# Patient Record
Sex: FEMALE | Employment: OTHER | URBAN - METROPOLITAN AREA
[De-identification: names, ages, dates, MRNs, and addresses within clinical notes are randomized per-mention and may not be internally consistent; named-entity substitution may affect disease eponyms.]

---

## 2018-09-17 ENCOUNTER — APPOINTMENT (OUTPATIENT)
Dept: PHYSICAL THERAPY | Age: 68
End: 2018-09-17

## 2018-10-01 ENCOUNTER — HOSPITAL ENCOUNTER (OUTPATIENT)
Dept: PHYSICAL THERAPY | Age: 68
Discharge: HOME | End: 2018-10-01
Payer: COMMERCIAL

## 2018-10-01 PROCEDURE — 97162 PT EVAL MOD COMPLEX 30 MIN: CPT

## 2018-10-01 PROCEDURE — 97165 OT EVAL LOW COMPLEX 30 MIN: CPT

## 2018-10-01 PROCEDURE — 97112 NEUROMUSCULAR REEDUCATION: CPT

## 2018-10-01 PROCEDURE — 97110 THERAPEUTIC EXERCISES: CPT

## 2018-10-01 NOTE — PROGRESS NOTES
[facility] [address] [facility] 
[phone] fax Plan of Care/ Statement of Necessity for Physical Therapy Services Patient name: Florinda Gagnon Start of Care: 10/1/2018 Referral source: Ayush Carty MD : 1950 Medical Diagnosis: Cerebral infarction, unspecified [I63.9] Onset Date: 2018 Treatment Diagnosis: CVA, difficulty with ambulation Prior Hospitalization: see medical history Provider#: [id number] Medications: Verified on Patient summary List  
 Comorbidities: HTN Prior Level of Function: Ind with ambulation, working, Ind with ADLs The Plan of Care and following information is based on the information from the initial evaluation. Assessment/ key information:  Pt. Is a 80year old female c/o left side weakness and difficulty with ambulation following a CVA in 2018. She reports having inpatient rehab and home health PT. She requires assistance for transfers to her bed and bed mobility at home. Pt. Is currently living with her daughter with 3 steps to enter the house and has no hand rails. She presents with significant decrease in left LE strength. Left hip flex: 0/5 knee flex: 1/5 knee ext: 2/5 ankle DF/PF 0/5. She has extensor tone in left knee as well. She required min A for stand pivot transfer to bed. Min A for sit to supine and supine to sit transfers. Skilled PT is medically necessary in order to improve left LE strength and transfers for improved independence at home. Evaluation Complexity History MEDIUM  Complexity : 1-2 comorbidities / personal factors will impact the outcome/ POC ; Examination MEDIUM Complexity : 3 Standardized tests and measures addressing body structure, function, activity limitation and / or participation in recreation  ;Presentation MEDIUM Complexity : Evolving with changing characteristics  ; Clinical Decision Making MEDIUM Complexity : FOTO score of 26-74 Overall Complexity Rating: MEDIUM Problem List: pain affecting function, decrease ROM, decrease strength, impaired gait/ balance, decrease ADL/ functional abilitiies, decrease activity tolerance, decrease flexibility/ joint mobility and decrease transfer abilities Treatment Plan may include any combination of the following: Therapeutic exercise, Therapeutic activities, Neuromuscular re-education, Physical agent/modality, Gait/balance training, Manual therapy, Patient education, Self Care training, Functional mobility training, Home safety training and Stair training Patient / Family readiness to learn indicated by: asking questions Persons(s) to be included in education: patient (P) Barriers to Learning/Limitations: None Patient Goal (s): to walk better Patient Self Reported Health Status: good Rehabilitation Potential: fair Short Term Goals: To be accomplished in 1 weeks: 1. Patient will demonstrate compliance with HEP in order to improve left LE mobility for increased ease of transfers Long Term Goals: To be accomplished in 6 weeks: 1. Patient will improve FOTO score by 13 points in order to demonstrate a significant improvement in function. 2. Patient will improve wheelchair to table transfers with SBA in order to increase ease of transfers at home. 3. Patient will perform sit to supine and supine to sit transfers with SBA in order to increase ease of transfers at home. 4. Patient will ambulate 20 feet with hemiwalker and CGA in order to increase ease of ambulation at home. Frequency / Duration: Patient to be seen 2-3 times per week for 6 weeks. Patient/ CarPatient/ Caregiver education and instruction: Diagnosis, prognosis, exercises 
 [x]  Plan of care has been reviewed with IRVIN Lopez, PT 10/1/2018 11:53 AM 
 
________________________________________________________________________ I certify that the above Therapy Services are being furnished while the patient is under my care. I agree with the treatment plan and certify that this therapy is necessary. [de-identified] Signature:____________Date:_________TIME:________ 
 
[employer], Date and Time must be completed for valid certification ** Please sign and return to [address] [facility] 
[phone] fax

## 2018-10-01 NOTE — PROGRESS NOTES
OT DAILY TREATMENT NOTE - [facility]  Patient Name: Chung Segal Date:10/1/2018 : 1950 [x]  Patient  Verified Payor: BLUE CROSS / Plan: [facility] PPO / Product Type: PPO / In time:1015  Out time:1100 Total Treatment Time (min): 45 Visit #: 1 of 24 Treatment Area: Cerebral infarction, unspecified [I63.9] SUBJECTIVE Pain Level (0-10 scale): 0/10 Any medication changes, allergies to medications, adverse drug reactions, diagnosis change, or new procedure performed?: [x] No    [] Yes (see summary sheet for update) Subjective functional status/changes:   [] No changes reported I am going to retire OBJECTIVE 15 min Therapeutic Exercise:  [] See flow sheet :  
Rationale: increase ROM to improve the patients ability to reach BUE swiss ball shoulder flexion, hor ab add x 10 each With 
 [] TE 
 [] TA 
 [] neuro 
 [] other: Patient Education: [x] Review HEP [] Progressed/Changed HEP based on: OT POC [] positioning   [] body mechanics   [] transfers   [] heat/ice application  
[] Splint wear/care   [] Sensory re-education   [] scar management     
[] other:   
 
     
Other Objective/Functional Measures:  
Subjective: pt is a right hand dominant, 80 y.o.y/o, female who fell from bed with right CVA injury 2018, on floor overnight until found went to [facility] until May, then [facility] in august.  
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 Pain level:(0-no pain 10-debilitating pain) no  
  
Current functional limitations/living situation: Staying with daughter in [facility], staying on first floor 2 steps to get in. Plan to stay there Medical hx: HTN Medications: See the written copy of this report in the patient's paper medical record. Objective: 
 
Range of Motion: Active Passive Norms Right Left Right Left Shoulder Flex 0-180  80  120 Ext 0-60      
 abd 0-180  40  120 Horizontal add 0-45 IR 0-70  unable ER 0-90  To ear  45 Elbow Ext/flex 0-150  -20  -20 Forearm Supination 0-80  [facility] [place] Pronation 0-80  WNL Wrist Flex 0-80  [facility] [place] Ext 0-70  [facility] Ulnar Dev 0-30  NT Radial Dev 0-20  NT Hand ROM WNL Hand Strength: 
 Gross Grasp 3pt Pinch Lateral Pinch Tip Pinch Right  50 11 14 12 Left 10 4 6 4 Nine-Hole Peg Test:  Left= _23 out, 37 in____seconds  Right=___20__secondsleft neglect Finger Opposition:Able to touch all with cueing Palpation: Poor scapular mobility ADLs Feeding:        []MaxA   []ModA   [x]Tiffany   [] CGA   []SBA   []Dariela   []Independent UE Dressing:       []MaxA   []ModA   [x]Tiffany   [] CGA   []SBA   []Dariela   []Independent LE Dressing:       []MaxA   [x]ModA   []Tiffany   [] CGA   []SBA   []Dariela   []Independent Grooming:       []MaxA   []ModA   []Tiffany   [] CGA   []SBA   []Dariela   [x]Independent Toileting:       []MaxA   []ModA   []Tiffany   [] CGA   []SBA   [x]Dariela   []IndependentBSC Bathing:       []MaxA   [x]ModA   []Tiffany   [] CGA   []SBA   []Darieal   []Independent Light Meal Prep:    [x]MaxA   []ModA   []Tiffany   [] CGA   []SBA   []Dariela   []Independent Household/Other:  [x]MaxA   []ModA   []Tiffany   [] CGA   []SBA   []Dariela   []Independent Adaptive Equip:     []MaxA   []ModA   []Tiffany   [] CGA   []SBA   []Dariela   []Independent Driving:       [x]MaxA   []ModA   []Tiffany   [] CGA   []SBA   []Dariela   []Independent Money Mgmt:        []MaxA   []ModA   []Tiffany   [] CGA   []SBA   []Dariela   []Independent Coordination   GM 
                         FM []WFL          [x] Impaired overflw []WFL          [x] Impaired overflow Tone/Motor Control []WFL          [x] Impaired praxis Midline Symmetry [x]WFL          [] Impaired Visual Perception:                    R/L Neglect R/L Discrimination Body Scheme []WFL          [x] Impaired  
[x]WFL          [] Impaired []WFL          [x] Impaired []WFL          [] Impaired Visual Motor Skills Tracking Tracing  
                         Writing  
[x]WFL          [] Impaired  
 
[x]WFL          [] Impaired  
[x]WFL          [] Impaired Cognition [x]Person         [x]Place            [x]Date [x]Situation/ Behavior Follows Commands  []     1-step  [] 2-step   [x]Multi-step Memory: STM 
                           LTM [x]WFL          [] Impaired  
[x]WFL          [] Impaired Safety Awareness [x]WFL          [] Impaired Judgment  [x]WFL          [] Impaired Express Needs [x]WFL          [] Impaired Pain Level (0-10 scale) post treatment: 4/10 ASSESSMENT/Changes in Function:   
[x]  See Plan of Care 
[]  See progress note/recertification 
[]  See Discharge Summary PLAN 
[]  Upgrade activities as tolerated     []  Continue plan of care 
[]  Update interventions per flow sheet      
[]  Discharge due to:_ 
[]  Other:_ ELIAS Avelar 10/1/2018  10:06 AM 
 
Future Appointments Date Time Provider Wolf Peters 10/1/2018 11:00 AM Elly Stiles, PT [id number] [facility]

## 2018-10-01 NOTE — PROGRESS NOTES
[facility] [address] [facility] 
[phone] fax Plan of Care/Statement of Necessity for Occupational Therapy Services Patient name: Chung Segal Start of Care: 10/1/2018 Referral source: Jessica Vanessa MD : 1950 Medical Diagnosis: Cerebral infarction, unspecified [I63.9] Onset Date:2018 Treatment Diagnosis: Weakness, loss of coordination left side, visual scanning deficit Prior Hospitalization: see medical history Provider#: [id number] Medications: Verified on Patient summary List  
 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 Assessment/ key information: 80year old RHD female who had sudden onset of weakness and fell from bed in 2018. She laid there overnight and then was transported to [facility] until May , then to [facility] and returned home in August.  She is currently living with daughter in [facility] with several steps to enter but full bath on first floor. She does not plan to return to own home or work as  at [employer] She denied pain initially, then reported 4/10 pain in left shoulder during PROM. She has marked apraxia and overflow movments in left upper extremity although AROm is essentially [place] [place] except shoulder flexion, abduction and ER and elbow extension. She receives assist for all self care except oral care and eating ( not cutting meat). She has active grasp of 10#, and pinches of 4-6#. She can complete the 9 hole peg test in 60 seconds but has visual deficits noted in locating pegs and holes, particularly on left side. She also demonstrated inaccurate scanning on targeted letter task. Her FOTO is 34/100 reflecting very severe impairment in function.   She will benefit from skilled occupational therapy to improve Left UE coordination, eliminate pain and increase independence with improved left side visual attention and scanning. Evaluation Complexity: History LOW Complexity : Brief history review  Examination LOW Complexity : 1-3 performance deficits relating to physical, cognitive , or psychosocial skils that result in activity limitations and / or participation restrictions  Clinical Decision Making LOW Complexity : No comorbidities that affect functional and no verbal or physical assistance needed to complete eval tasks Overall Complexity Rating: LOW Problem List: Pain effecting function, Decreased range of motion, Decreased strength, Decreased coordination/prehension, Decreased ADL/functional abilities  and Other Treatment Plan may include any combination of the following: Therapeutic exercise, Therapeutic activities, Neuromuscular re-education, Physical agent/modality, Patient education, ADLs/IADLs and Other Patient / Family readiness to learn indicated by: asking questions, trying to perform skills and interest 
 
Persons(s) to be included in education:   patient (P) Barriers to Learning/Limitations: yes;  sensory deficits-vision/hearing/speech Patient Goal (s): Do more for self, stronger Patient Self Reported Health Status: good Rehabilitation Potential: excellent Short Term Goals: To be accomplished in 2 weeks: 1. Patient will be familiar with strategies to improve accuracy of scanning skills. 2.  Patient will be independent in home program for stretching left shoulder and elbow. 3.  Patient will be able to place left hand accurately 8/10 trials to reduce injury risk. Long Term Goals: To be accomplished in 8 weeks: 1. Patient will be able to perform basic self care tasks at min assist level for safety issues only. 2  .  Paiten tiwll be able to participate safely in home care tasks from wheelchair level. 3.  Patient iwll improve left hand  at least 10# and pinches at least 2-3# each for ease of fasteners and opening packages. 4.  Patient willl  report improve performance of home care ans self care as shown by increase in FOTO of at least 20 points. Frequency / Duration: Patient to be seen 3 times per week for 8 weeks: 
 
Patient/ Caregiver education and instruction: Diagnosis, prognosis, self care, activity modification and exercises 
 [x]  Plan of care has been reviewed with VESNA Brink/L 10/1/2018 12:25 PM 
 
_____________________________________________________________________ I certify that the above Therapy Services are being furnished while the patient is under my care. I agree with the treatment plan and certify that this therapy is necessary. [de-identified] Signature:____________Date:_________TIME:________ 
 
[employer], Date and Time must be completed for valid certification ** Please sign and return to [address] [facility] 
[phone] fax

## 2018-10-01 NOTE — PROGRESS NOTES
PT DAILY TREATMENT NOTE/NEURO EVAL 10-18 Patient Name: Logan Allen Date:10/1/2018 : 1950 [x]  Patient  Verified Payor: BLUE CROSS / Plan: [facility] PPO / Product Type: PPO / In time: 11:00  Out time: 11:50 Total Treatment Time (min): 50 Visit #: 1 of  Treatment Area: Cerebral infarction, unspecified [I63.9] SUBJECTIVE Pain Level (0-10 scale):  0/10 []constant []intermittent []improving []worsening []no change since onset Any medication changes, allergies to medications, adverse drug reactions, diagnosis change, or new procedure performed?: [x] No    [] Yes (see summary sheet for update) Subjective functional status/changes:    
PLOF: right hand dominant Mechanism of Injury:  CVA end of April. Was at home and fell out of bed. Daughter checked in on her and then called 911 to take her to hospital. Was in hospital. And had inpatient rehab at [facility]. Also had home health PT. Wheelchair bound at home. Transfers to bed side commode by herself. Assistance into bed. Some assistance getting leg into bed when laying. Assistance with car transfer. Used Vinfoliowalker for ambulation during home health. Work Hx: worked at [employer] on [employer] Living Situation:  Lives with daughter currently. 3 steps to get inside. And 1 step inside. No rails. Pt Goals: to get mobile OBJECTIVE/EXAMINATION 8 min Therapeutic Exercise:  [x] See flow sheet :  
Rationale: increase ROM and increase strength to improve the patients ability to increase ease of ADLs With 
 [x] TE 
 [] TA 
 [] neuro 
 [] other: Patient Education: [x] Review HEP [] Progressed/Changed HEP based on:  
[] positioning   [] body mechanics   [] transfers   [] heat/ice application   
[] other:   
 
Physical Therapy Evaluation  Neurologic Gait: [] Normal    [x] Abnormal    Device:  Parallel bars Describe: mod A x2 to bring left LE fwd 
 
 Strength (MMT):                                         
Hip L (1-5) R (1-5) Hip Flexion 0 4 Hip Ext Hip ABD Hip ADD Hip ER Hip IR Knee L (1-5) R (1-5) Knee Flexion 1 4 Knee Extension 2 4+ Ankle PF 0 4 Ankle DF 0 4 Other Tone: extensor tone in left knee Functional Mobility Bed Mobility:   
  Scooting:  
     Rolling: 
     Sit-Supine: mod A Transfers: 
     Sit-Stand: CGA with limited use of left LE Floor-Stand:  
    Gait: 
     Tandem: 
     Backwards: 
     Braiding: 
    Elevations: 
     Curbs: 
    Ramps: 
    Stairs: Other test /comments: 
  
 
Pain Level (0-10 scale) post treatment: 0/10 ASSESSMENT/Changes in Function:  
  
[x]  See Plan of Care 
[]  See progress note/recertification 
[]  See Discharge Summary Progress towards goals / Updated goals: 
See POC PLAN 
[]  Upgrade activities as tolerated     [x]  Continue plan of care 
[]  Update interventions per flow sheet      
[]  Discharge due to:_ 
[]  Other:_ Junior Giron PT 10/1/2018  11:05 AM

## 2018-10-03 ENCOUNTER — HOSPITAL ENCOUNTER (OUTPATIENT)
Dept: PHYSICAL THERAPY | Age: 68
Discharge: HOME | End: 2018-10-03
Payer: COMMERCIAL

## 2018-10-03 PROCEDURE — 97110 THERAPEUTIC EXERCISES: CPT

## 2018-10-03 PROCEDURE — 97530 THERAPEUTIC ACTIVITIES: CPT

## 2018-10-03 PROCEDURE — 97112 NEUROMUSCULAR REEDUCATION: CPT

## 2018-10-03 NOTE — PROGRESS NOTES
OT DAILY TREATMENT NOTE - [facility] 3-16 Patient Name: Cecelia Guillaume Date:10/3/2018 : 1950 [x]  Patient  Verified Payor: BLUE CROSS / Plan: [facility] PPO / Product Type: PPO / In time:1100  Out time:1130 Total Treatment Time (min): 30 Total Timed Codes (min): 30 
1:1 Treatment Time (MC only): 30 Visit #: 2 of 24 Treatment Area: Muscle weakness of left upper extremity [M62.81] SUBJECTIVE Pain Level (0-10 scale): 0/10 Any medication changes, allergies to medications, adverse drug reactions, diagnosis change, or new procedure performed?: [x] No    [] Yes (see summary sheet for update) Subjective functional status/changes:   [] No changes reported Oh that was fun! OBJECTIVE 12 min Therapeutic Exercise:  [] See flow sheet :  
Rationale: increase ROM and increase strength to improve the patients ability to reach Swiss Ball: B Shoulder flexion, horizontal ab/adduction 18 min Therapeutic Activity:  []  See flow sheet :  
Rationale: Scanning  to improve the patients ability to perform tasks at home safetly Scanning task/education - Word Search With 
 [] TE 
 [] TA 
 [] neuro 
 [] other: Patient Education: [x] Review HEP [] Progressed/Changed HEP based on:  
[] positioning   [] body mechanics   [] transfers   [] heat/ice application  
[] Splint wear/care   [] Sensory re-education   [] scar management     
[] other:   
 
     
Other Objective/Functional Measures: Mod/Max difficulty with word search, improvement with success as activity progressed Pain Level (0-10 scale) post treatment: 0/10 ASSESSMENT/Changes in Function: ROM improving, continued difficulties with scanning Patient will continue to benefit from skilled OT services to modify and progress therapeutic interventions, address ROM deficits, address strength deficits, analyze and cue movement patterns and instruct in home and community integration to attain remaining goals. []  See Plan of Care 
[]  See progress note/recertification 
[]  See Discharge Summary Progress towards goals / Updated goals: 
Short Term Goals: To be accomplished in 2 weeks: 1. Patient will be familiar with strategies to improve accuracy of scanning skills. Progressing 10/3/18 2. Patient will be independent in home program for stretching left shoulder and elbow. 3.  Patient will be able to place left hand accurately 8/10 trials to reduce injury risk. 
  
Long Term Goals: To be accomplished in 8 weeks: 1.  Patient will be able to perform basic self care tasks at min assist level for safety issues only. 2  .  Paiten tiwll be able to participate safely in home care tasks from wheelchair level. 3.  Patient iwll improve left hand  at least 10# and pinches at least 2-3# each for ease of fasteners and opening packages. 4.  Patient willl  report improve performance of home care ans self care as shown by increase in FOTO of at least 20 points. PLAN 
[]  Upgrade activities as tolerated     [x]  Continue plan of care 
[]  Update interventions per flow sheet      
[]  Discharge due to:_ 
[]  Other:_ MAICOL Brandt 10/3/2018  11:54 AM 
 
Future Appointments Date Time Provider Wolf Peters 10/5/2018 11:00 AM [facility] PT [address] MMCPTPB [facility]  
10/5/2018 11:30 AM Fritz Davis MMCPTPB [facility]  
10/9/2018 11:00 AM Slim Brown PTA [facility]CPTP[facility]  
10/9/2018 11:30 AM Rick Scales Grogg MMCPTPB [facility]  
10/10/2018 11:00 AM Fritz Davis MMCPTPB [facility]  
10/10/2018 11:30 AM Sergio Hermosillo PT [id number] [facility]  
10/12/2018 11:00 AM Sergio Hermosillo, PT UOUUXCT [facility]  
10/12/2018 11:30 AM Rick Scales Grogg MMCPTPB [facility]  
10/15/2018 9:30 AM Sergio Hermosillo, PT [id number] [facility]  
10/15/2018 10:15 AM Rick Scales Grogg MMCPTPB [facility]  
10/17/2018 11:00 AM Miguelito Paz, OTR/L MMCPTPB [facility]  
10/17/2018 12:00 PM Sergio Hermosillo, PT MMCPTPB [facility]  
 10/19/2018 10:45 AM Miguelito Paz, OTR/L MMCPTPB [facility]  
10/19/2018 11:30 AM Sergio Hermosillo, PT [id number] [facility]  
10/22/2018 10:30 AM Sergio Hermosillo, PT [id number] [facility]  
10/22/2018 11:00 AM Fritz Davis [id number] [facility]  
10/24/2018 10:15 AM Rick Khan [id number] [facility]  
10/24/2018 11:00 AM Robina Trujillo, PT MMCPTPB [facility]  
10/26/2018 10:30 AM Sergio Hermosillo, PT [id number] [facility]  
10/26/2018 11:00 AM Fritz Davis [id number] [facility]  
10/29/2018 10:30 AM Osman Collins PTA MMCPTPB [facility]  
10/29/2018 11:00 AM Fritz Davis MMCPTPB [facility]  
10/31/2018 10:30 AM Sergio Hermosillo, PT [id number] [facility]  
10/31/2018 11:00 AM Fritz Davis [id number] [facility]  
11/2/2018 10:30 AM Sergio Hermosillo, PT [id number] [facility]  
11/2/2018 11:15 AM Fritz Odonnell Lasso MMCPTPB [facility]

## 2018-10-03 NOTE — PROGRESS NOTES
PT DAILY TREATMENT NOTE - [facility]  Patient Name: Leo Naidu Date:10/3/2018 : 1950 [x]  Patient  Verified Payor: BLUE CROSS / Plan: [facility] PPO / Product Type: PPO / In time: 10:30  Out time: 11:00 Total Treatment Time (min): 30 Total Timed Codes (min): 30 
1:1 Treatment Time ( only): 30 Visit #: 2 of  Treatment Area: Cerebral infarction, unspecified [I63.9] SUBJECTIVE Pain Level (0-10 scale): 0/10 Any medication changes, allergies to medications, adverse drug reactions, diagnosis change, or new procedure performed?: [x] No    [] Yes (see summary sheet for update) Subjective functional status/changes:   [] No changes reported Pt. Reports she is feeling about the same. She has been doing her HEP. OBJECTIVE 30 min Neuromuscular Re-education:  -x  See flow sheet :  
Rationale: increase strength, improve coordination and improve balance  to improve the patients ability to increase ease of ambulation With 
 [x] TE 
 [] TA 
 [] neuro 
 [] other: Patient Education: [x] Review HEP [] Progressed/Changed HEP based on:  
[] positioning   [] body mechanics   [] transfers   [] heat/ice application   
[] other:   
 
Other Objective/Functional Measures:  
Pt. Continues to be challenged with weight bearing through left LE for sit to stand transfers Poor muscle activation during PNF and heel slides Pt. Has significant extensor tone in right knee Pain Level (0-10 scale) post treatment:  0/10 ASSESSMENT/Changes in Function:  Pt. Initiated PT and is compliant with her HEP.   
 
Patient will continue to benefit from skilled PT services to modify and progress therapeutic interventions, address functional mobility deficits, address ROM deficits, address strength deficits, analyze and address soft tissue restrictions, analyze and cue movement patterns, analyze and modify body mechanics/ergonomics and assess and modify postural abnormalities to attain remaining goals. Progress towards goals / Updated goals: 
Short Term Goals: To be accomplished in 1 weeks: 1. Patient will demonstrate compliance with HEP in order to improve left LE mobility for increased ease of transfers 
met 
  
Long Term Goals: To be accomplished in 6 weeks: 1. Patient will improve FOTO score by 13 points in order to demonstrate a significant improvement in function. 2. Patient will improve wheelchair to table transfers with SBA in order to increase ease of transfers at home. 3. Patient will perform sit to supine and supine to sit transfers with SBA in order to increase ease of transfers at home. 4. Patient will ambulate 20 feet with hemiwalker and CGA in order to increase ease of ambulation at home. PLAN 
[]  Upgrade activities as tolerated     [x]  Continue plan of care 
[]  Update interventions per flow sheet      
[]  Discharge due to:_ 
[]  Other:_ Racheal Mar PT 10/3/2018  6:11 PM 
 
Future Appointments Date Time Provider Wolf Peters 10/5/2018 11:00 AM [facility] PT [facility] [address] MMCPTPB [facility]  
10/5/2018 11:30 AM Vito Zhenggg MMCPTPB [facility]  
10/9/2018 11:00 AM Amos Solis PTA MMCPTPB [facility]  
10/9/2018 11:30 AM Vito Zhenggg MMCPTPB [facility]  
10/10/2018 11:00 AM Vito Zhenggg MMCPTPB [facility]  
10/10/2018 11:30 AM Racheal Mar PT [id number] [facility]  
10/12/2018 11:00 AM Racheal Mar PT SKDNORS [facility]  
10/12/2018 11:30 AM Vito Zhenggg MMCPTPB [facility]  
10/15/2018 9:30 AM Racheal Mar PT [id number] [facility]  
10/15/2018 10:15 AM Vito Delarosa MMCPTPB [facility]  
10/17/2018 11:00 AM Ghazala Jhonatan, OTR/L MMCPTPB [facility]  
10/17/2018 12:00 PM Racheal Mar, PT MMCPTPB [facility]  
10/19/2018 10:45 AM Ghazala Israel, OTR/L MMCPTPB [facility]  
10/19/2018 11:30 AM Racheal Mar, PT [id number] [facility]  
10/22/2018 10:30 AM Racheal Mar, PT [id number] [facility]  
10/22/2018 11:00 AM Ailyn Pham [id number] [facility]  
10/24/2018 10:15 AM Trini Sepulveda MMCPTPB [facility]  
 10/24/2018 11:00 AM Uday Poon, PT MMCPTPB [facility]  
10/26/2018 10:30 AM Tin Romo, PT [id number] [facility]  
10/26/2018 11:00 AM Kerry Dee MMCPTPB [facility]  
10/29/2018 10:30 AM Matthew Granados PTA MMCPTPB [facility]  
10/29/2018 11:00 AM Kerry Dee MMCPTPB [facility]  
10/31/2018 10:30 AM Tin Romo, PT BRAATHH [facility]  
10/31/2018 11:00 AM Kerry Dee MMCPTPB [facility]  
11/2/2018 10:30 AM Tin Romo, PT CHRAWPH [facility]  
11/2/2018 11:15 AM Kerry Hood MMCPTPB [facility]

## 2018-10-05 ENCOUNTER — HOSPITAL ENCOUNTER (OUTPATIENT)
Dept: PHYSICAL THERAPY | Age: 68
Discharge: HOME | End: 2018-10-05
Payer: COMMERCIAL

## 2018-10-05 PROCEDURE — 97530 THERAPEUTIC ACTIVITIES: CPT

## 2018-10-05 PROCEDURE — 97110 THERAPEUTIC EXERCISES: CPT

## 2018-10-05 PROCEDURE — 97112 NEUROMUSCULAR REEDUCATION: CPT

## 2018-10-05 NOTE — PROGRESS NOTES
PT DAILY TREATMENT NOTE - [facility]  Patient Name: Jag Case Date:10/5/2018 : 1950 [x]  Patient  Verified Payor: BLUE CROSS / Plan: [facility] PPO / Product Type: PPO / In time: 11:00  Out time: 11:32 Total Treatment Time (min): 32 Total Timed Codes (min): 30 
1:1 Treatment Time ( only): 30 Visit #: 3 of  Treatment Area: Cerebral infarction, unspecified [I63.9] SUBJECTIVE Pain Level (0-10 scale): 0/10 Any medication changes, allergies to medications, adverse drug reactions, diagnosis change, or new procedure performed?: [x] No    [] Yes (see summary sheet for update) Subjective functional status/changes:   [x] No changes reported OBJECTIVE 
  
32 (30) min Neuromuscular Re-education:  See flow sheet : (2 minutes waiting on therapist) Rationale: increase strength, improve coordination and improve balance  to improve the patients ability to increase ease of ambulation With 
 [x] TE 
 [] TA 
 [] neuro 
 [] other: Patient Education: [x] Review HEP [] Progressed/Changed HEP based on:  
[] positioning   [] body mechanics   [] transfers   [] heat/ice application   
[] other:   
 
Other Objective/Functional Measures:  
- Patient with poor left LE weightbearing and no quad control. Held gait training until patient is able to functionally weight shift and perform stepping, as it would be max/total A and unsafe at this time. Pain Level (0-10 scale) post treatment:  0/10 ASSESSMENT/Changes in Function:  Fair tolerance to TE with Mod A for all functional tasks. Patient is able to stand to walker and transfer with CGA however in a non-functional manner as she demonstrates hardly any weightbearing through left LE and left knee extensor tone.  She is able to perform supine to sidelying to sit and sit to sidelying to supine to/from the right but reports she is not able to do this at home as she cannot access the right side of her bed, forcing her to sit up to/from the left which requires her to sit straight up and Min A form her daughter to  manage her left LE. She requires Total A to flex her left knee to perform bridging and heel slides. She requires facilitation at left hip to clear mat to perform functional bridging. Max A required to stand at walker and perform weight shifts, with left knee block and max verbal cuing. Patient requires max cuing for technique to perform semi-functional sit to stand, educated on NDT hand placement to increase weight bearing through left LE, however patient reports she has help at home and her daughter assists her with everything including w/c mobility. Patient will continue to benefit from skilled PT services to modify and progress therapeutic interventions, address functional mobility deficits, address ROM deficits, address strength deficits, analyze and address soft tissue restrictions, analyze and cue movement patterns, analyze and modify body mechanics/ergonomics and assess and modify postural abnormalities to attain remaining goals. Progress towards goals / Updated goals: 
Short Term Goals: To be accomplished in 1 weeks: 1. Patient will demonstrate compliance with HEP in order to improve left LE mobility for increased ease of transfers 
met 
  
Long Term Goals: To be accomplished in 6 weeks: 1. Patient will improve FOTO score by 13 points in order to demonstrate a significant improvement in function. 2. Patient will improve wheelchair to table transfers with SBA in order to increase ease of transfers at home. 3. Patient will perform sit to supine and supine to sit transfers with SBA in order to increase ease of transfers at home. 4. Patient will ambulate 20 feet with hemiwalker and CGA in order to increase ease of ambulation at home. PLAN 
[]  Upgrade activities as tolerated     [x]  Continue plan of care 
[]  Update interventions per flow sheet []  Discharge due to:_ 
[]  Other:_   
 
Baron Garciat 10/5/2018  11:32 AM 
 
Future Appointments Date Time Provider Wolf Peters 10/9/2018 11:00 AM Nitin Fernandez PTA MMCPTPB [facility]  
10/9/2018 11:30 AM Kerry Dee MMCPTPB [facility]  
10/10/2018 11:00 AM Kerry Dee MMCPTPB [facility]  
10/10/2018 11:30 AM Tin Romo, PT [id number] [facility]  
10/12/2018 11:00 AM Tin Romo, PT [id number] [facility]  
10/12/2018 11:30 AM Kerry Dee MMCPTPB [facility]  
10/15/2018 9:30 AM Tin Romo, PT [id number] [facility]  
10/15/2018 10:15 AM Kerry Dee IWZPENH [facility]  
10/17/2018 11:00 AM Elmiraella Emmanuel, OTR/L MMCPTPB [facility]  
10/17/2018 12:00 PM Tin Romo, PT MMCPTPB [facility]  
10/19/2018 10:45 AM Suella Emmanuel, OTR/L MMCPTPB [facility]  
10/19/2018 11:30 AM Tin Romo, PT [id number] [facility]  
10/22/2018 10:30 AM Tin Romo, PT [id number] [facility]  
10/22/2018 11:00 AM Kerry Dee JKFEAMA [facility]  
10/24/2018 10:15 AM Ravindra Teresa [id number] [facility]  
10/24/2018 11:00 AM Uday Poon, PT MMCPTPB [facility]  
10/26/2018 10:30 AM Tin Romo, PT [id number] [facility]  
10/26/2018 11:00 AM Kerry Dee MMCPTPB [facility]  
10/29/2018 10:30 AM Matthew Granados PTA MMCPTPB [facility]  
10/29/2018 11:00 AM Kerry Dee MMCPTPB [facility]  
10/31/2018 10:30 AM Tin Romo, PT [id number] [facility]  
10/31/2018 11:00 AM eKrry Dee MMCPTPB [facility]  
11/2/2018 10:30 AM Tin Romo, PT [id number] [facility]  
11/2/2018 11:15 AM Kerry Hood MMCPTPB [facility]

## 2018-10-05 NOTE — PROGRESS NOTES
OT DAILY TREATMENT NOTE 10-18 Patient Name: Carmina Iyer Date:10/5/2018 : 1950 [x]  Patient  Verified Payor: BLUE CROSS / Plan: [facility] PPO / Product Type: PPO / In time:1135  Out time:1200 Total Treatment Time (min): 25 Visit #: 3 of 24 Medicare/BCBS Only Total Timed Codes (min):  25 1:1 Treatment Time:  25 Treatment Area: Muscle weakness of left upper extremity [M62.81] SUBJECTIVE Pain Level (0-10 scale): 0/10 Any medication changes, allergies to medications, adverse drug reactions, diagnosis change, or new procedure performed?: [x] No    [] Yes (see summary sheet for update) Subjective functional status/changes:   [] No changes reported Pt reports stiffness in neck OBJECTIVE 10 min Therapeutic Exercise:  [] See flow sheet :  
Rationale: increase ROM and increase strength to improve the patients ability to functionally use Left UE 
 
B Floor [id number] 
B Horizontal Ab/Adduction 10x B Shoulder flexion 10x 
 
15 min Therapeutic Activity:  []  See flow sheet :  
Rationale: Scanning  to improve the patients ability to perform daily tasks, read Word Seacg With 
 [] TE 
 [] TA 
 [] neuro 
 [] other: Patient Education: [x] Review HEP [] Progressed/Changed HEP based on:  
[] positioning   [] body mechanics   [] transfers   [] heat/ice application  
[] Splint wear/care   [] Sensory re-education   [] scar management     
[] other:   
 
     
Other Objective/Functional Measures:  
Pt able to locate 5 words with Min Cueing Pain Level (0-10 scale) post treatment: 0/10 ASSESSMENT/Changes in Function: Improvement with scanning ability on this date Patient will continue to benefit from skilled OT services to modify and progress therapeutic interventions, address ROM deficits, address strength deficits, analyze and cue movement patterns and instruct in home and community integration to attain remaining goals. []  See Plan of Care []  See progress note/recertification 
[]  See Discharge Summary Progress towards goals / Updated goals: 
Short Term Goals: To be accomplished in 2 weeks: 1.  Patient will be familiar with strategies to improve accuracy of scanning skills. Progressing 10/5/18 2.  Patient will be independent in home program for stretching left shoulder and elbow. Improving 10/5/18 3.  Patient will be able to place left hand accurately 8/10 trials to reduce injury risk. 
   
Long Term Goals: To be accomplished in [address] [place] [address] 
1.  Patient will be able to perform basic self care tasks at min assist level for safety issues only. 2  .  Massimoten thomaswll be able to participate safely in home care tasks from wheelchair level. 3.  Patient iwll improve left hand  at least 10# and pinches at least 2-3# each for ease of fasteners and opening packages. 4.  Patient willl  report improve performance of home care ans self care as shown by increase in FOTO of at least 20 points. PLAN 
[]  Upgrade activities as tolerated     []  Continue plan of care 
[]  Update interventions per flow sheet      
[]  Discharge due to:_ 
[]  Other:_ MAICOL Flannery 10/5/2018  12:26 PM 
 
Future Appointments Date Time Provider Wolf Peters 10/9/2018 11:00 AM Leopoldo Barrett, PTA MMCPTPB [facility]  
10/9/2018 11:30 AM Aminata Delarosa MMCPTPB [facility]  
10/10/2018 11:00 AM Abbe Juanjose MMCPTPB [facility]  
10/10/2018 11:30 AM Ino Reyes, PT [id number] [facility]  
10/12/2018 11:00 AM Ino Reyes, PT IDLMPRV [facility]  
10/12/2018 11:30 AM Abbe Sow MMCPTPB [facility]  
10/15/2018 9:30 AM Ino Reyes, PT [id number] [facility]  
10/15/2018 10:15 AM Aminata Delarosa MMCPTPB [facility]  
10/17/2018 11:00 AM Edith Alpers, OTR/L MMCPTPB [facility]  
10/17/2018 12:00 PM Ino Reyes PT MMCPTPB [facility]  
10/19/2018 10:45 AM Edith Alpers, OTR/L MMCPTPB [facility]  
10/19/2018 11:30 AM Ino Reyes, PT [id number] [facility]  
 10/22/2018 10:30 AM Jose Brewster, PT MMCPTPB [facility]  
10/22/2018 11:00 AM Olden [employer] MMCPTPB [facility]  
10/24/2018 10:15 AM Isreal Mettle Caryle Sawyermando CROWDER [facility]  
10/24/2018 11:00 AM Adina Peguero, PT MMCPTPB [facility]  
10/26/2018 10:30 AM Jose Brewster, PT [id number] [facility]  
10/26/2018 11:00 AM [employer] HWJEBMX [facility]  
10/29/2018 10:30 AM Carlos Perez, PTA MMCPTPB [facility]  
10/29/2018 11:00 [facility]  
10/31/2018 10:30 AM Jose Brewster, PT [id number] [facility]  
10/31/2018 11:00 AM [employer] MMCPTPB [facility]  
11/2/2018 10:30 AM Jose Brewster, PT [id number] [facility]  
11/2/2018 11:15 AM [employer] Cathy Gonzalez MMCPTPB [facility]

## 2018-10-09 ENCOUNTER — HOSPITAL ENCOUNTER (OUTPATIENT)
Dept: PHYSICAL THERAPY | Age: 68
Discharge: HOME | End: 2018-10-09
Payer: COMMERCIAL

## 2018-10-09 PROCEDURE — 97112 NEUROMUSCULAR REEDUCATION: CPT

## 2018-10-09 PROCEDURE — 97530 THERAPEUTIC ACTIVITIES: CPT

## 2018-10-09 PROCEDURE — 97110 THERAPEUTIC EXERCISES: CPT

## 2018-10-09 NOTE — PROGRESS NOTES
PT DAILY TREATMENT NOTE 10-18 Patient Name: Basil Sosa Date:10/9/2018 : 1950 [x]  Patient  Verified Payor: BLUE CROSS / Plan: [facility] PPO / Product Type: PPO / In time:1100  Out time:1129 Total Treatment Time (min): 29 Visit #: 4 of  Medicare/BCBS Only Total Timed Codes (min):  29 1:1 Treatment Time:  34 Treatment Area: Cerebral infarction, unspecified [I63.9] SUBJECTIVE Pain Level (0-10 scale): 0/10 Any medication changes, allergies to medications, adverse drug reactions, diagnosis change, or new procedure performed?: [x] No    [] Yes (see summary sheet for update) Subjective functional status/changes:   [] No changes reported Pt stated that she is doing well today OBJECTIVE 19 min Therapeutic Activity:  [x]  See flow sheet :  
Rationale: increase ROM, increase strength, improve coordination and improve balance  to improve the patients ability to increase ease with ADLs 10 min Neuromuscular Re-education:  [x]  See flow sheet :  
Rationale: increase ROM, increase strength, improve coordination and improve balance  to improve the patients ability to decrease fall risk With 
 [x] TE 
 [] TA 
 [] neuro 
 [] other: Patient Education: [x] Review HEP [] Progressed/Changed HEP based on:  
[] positioning   [] body mechanics   [] transfers   [] heat/ice application   
[] other:   
 
Other Objective/Functional Measures:  
Had moderate difficulty with bridges Pt is unable to bend left knee in supine Cont to need assist to move leg with transfers Has no difficulty with sit to stand transfers with right foot back, but had significant difficulty when left foot was back Had some difficulty with LAQ, but was able to perform Was unable to perform step taps Pain Level (0-10 scale) post treatment: 0/10 ASSESSMENT/Changes in Function:  
Pt is slowly progressing toward goals.  Sit to stand transfers are improving. Cont to need guarding with WC to mat table transfers. Cont to have significant decrease in strength of left LE. Pt has not begun to ambulate in therapy 2* lack of control of left LE. Pt cont to have difficulty with weight bearing on the left LE Patient will continue to benefit from skilled PT services to modify and progress therapeutic interventions, address functional mobility deficits, address ROM deficits, address strength deficits and address imbalance/dizziness to attain remaining goals. [x]  See Plan of Care 
[]  See progress note/recertification 
[]  See Discharge Summary Progress towards goals / Updated goals: 
Short Term Goals: To be accomplished in 1 weeks: 1. Patient will demonstrate compliance with HEP in order to improve left LE mobility for increased ease of transfers 
met 
[address] be accomplished in 6 weeks: 1. Patient will improve FOTO score by 13 points in order to demonstrate a significant improvement in function. 2. Patient will improve wheelchair to table transfers with SBA in order to increase ease of transfers at home. 3. Patient will perform sit to supine and supine to sit transfers with SBA in order to increase ease of transfers at home. 4. Patient will ambulate 20 feet with hemiwalker and CGA in order to increase ease of ambulation at home. PLAN 
[]  Upgrade activities as tolerated     [x]  Continue plan of care 
[]  Update interventions per flow sheet      
[]  Discharge due to:_ 
[]  Other:_ Rhiannon Edward PTA 10/9/2018  10:53 AM 
 
Future Appointments Date Time Provider Wolf Peters 10/9/2018 11:00 AM Rhiannon Edward PTA MMCPTPB [facility]  
10/9/2018 11:30 AM Marissa Walker MMCPTPB [facility]  
10/10/2018 11:00 AM Marissa Walker MMCPTPB [facility]  
10/10/2018 11:30 AM Kenneth Martínez PT [id number] [facility]  
10/12/2018 11:00 AM Kenneth Martínez PT ABUOXOX [facility]  
10/12/2018 11:30 AM Marissa Gallardo MMCPTPB [facility]  
 10/15/2018 9:30 AM Elena Asher, PT MMCPTPB [facility]  
10/15/2018 10:15 AM Liss Peralta MMCPTPB [facility]  
10/17/2018 11:00 AM Amos Mahan, OTR/L MMCPTPB [facility]  
10/17/2018 12:00 PM Magalys Arteaga, PTA MMCPTPB [facility]  
10/19/2018 10:45 AM Amos Mahan, OTR/L [facility]CP[facility]B [facility]  
10/19/2018 11:30 AM Elena Asher, PT SILVIA [facility]  
10/22/2018 10:30 AM Elena Asher, PT [id number] [facility]  
10/22/2018 11:00 AM Liss Peralta EHOTPKN [facility]  
10/24/2018 10:15 AM Nick Wen KLKPDWK [facility]  
10/24/2018 11:00 AM Marlene Damon, PT MMCPTPB [facility]  
10/26/2018 10:30 AM Elena Asher, PT [id number] [facility]  
10/26/2018 11:00 AM Genearminda Sernas [facility]CPTPB [facility]  
10/29/2018 10:30 AM Ida Harrison, PTA MMCPTPB [facility]  
10/29/2018 11:00 AM Genearminda Alvaradobes MMCPTPB [facility]  
10/31/2018 10:30 AM Elena Asher, PT [id number] [facility]  
10/31/2018 11:00 AM Liss Peralta UNDXLCR [facility]  
11/2/2018 10:30 AM Elena Asher, PT [facility]  
11/2/2018 11:15 AM Liss Reed [facility]

## 2018-10-09 NOTE — PROGRESS NOTES
OT DAILY TREATMENT NOTE 10-18 Patient Name: Tera Jain Date:10/9/2018 : 1950 [x]  Patient  Verified Payor: BLUE CROSS / Plan: [facility] PPO / Product Type: PPO / In time:1130  Out time:1200 Total Treatment Time (min): 30 Visit #: 4 of 24 Medicare/BCBS Only Total Timed Codes (min):  30 1:1 Treatment Time:  30 Treatment Area: Muscle weakness of left upper extremity [M62.81] SUBJECTIVE Pain Level (0-10 scale): 0/10 Any medication changes, allergies to medications, adverse drug reactions, diagnosis change, or new procedure performed?: [x] No    [] Yes (see summary sheet for update) Subjective functional status/changes:   [] No changes reported I used to have a really hard time letting things go with my hand. They would get stuck. OBJECTIVE 15 min Therapeutic Exercise:  [] See flow sheet :  
Rationale: increase ROM and increase strength to improve the patients ability to functionally use Left UE,  Swiss Ball: B shoulder flexion, B horizontal abduction/adduction Red Graded Clothes Pin: Left hand using various pinch prehensions 20x 15 min Therapeutic Activity:  -  See flow sheet :  
Rationale: increase ROM and improve coordination  to improve the patients ability to grasp and release Left Hand to target on table: 1010 Grasp 1 in pegs and move/release 25x With 
 [] TE 
 [] TA 
 [] neuro 
 [] other: Patient Education: [x] Review HEP [] Progressed/Changed HEP based on:  
[] positioning   [] body mechanics   [] transfers   [] heat/ice application  
[] Splint wear/care   [] Sensory re-education   [] scar management     
[] other:   
 
     
Other Objective/Functional Measures: Mod difficulty with clothes pin. Verbal cueing required for improving prehension patterns Pain Level (0-10 scale) post treatment: 0/10 ASSESSMENT/Changes in Function: Improvement with accuracy Patient will continue to benefit from skilled OT services to modify and progress therapeutic interventions, address ROM deficits, address strength deficits, analyze and cue movement patterns and instruct in home and community integration to attain remaining goals. []  See Plan of Care 
[]  See progress note/recertification 
[]  See Discharge Summary Progress towards goals / Updated goals: 
Short Term Goals: To be accomplished in 2 weeks: 1.  Patient will be familiar with strategies to improve accuracy of scanning skills. Progressing 10/5/18 2.  Patient will be independent in home program for stretching left shoulder and elbow. Improving 10/5/18 3.  Patient will be able to place left hand accurately 8/10 trials to reduce injury risk. Met 10/9/18 
   
Long Term Goals: To be accomplished in [address] [place] [address] 
1.  Patient will be able to perform basic self care tasks at min assist level for safety issues only. 2  .Paiten tiwll be able to participate safely in home care tasks from wheelchair level. 3.  Patient iwll improve left hand  at least 10# and pinches at least 2-3# each for ease of fasteners and opening packages. Progressing 10/9/18 4.  Patient willl  report improve performance of home care ans self care as shown by increase in FOTO of at least 20 points. PLAN 
[]  Upgrade activities as tolerated     [x]  Continue plan of care 
[]  Update interventions per flow sheet      
[]  Discharge due to:_ 
[]  Other:_ MAICOL Frost 10/9/2018  11:22 AM 
 
Future Appointments Date Time Provider Wolf Peters 10/9/2018 11:30 AM Ying Delarosa MMCPTPB [facility]  
10/10/2018 11:00 AM Mg Isabel MMCPTPB [facility]  
10/10/2018 11:30 AM Kedar Hall, PT [id number] [facility]  
10/12/2018 11:00 AM Kedar Hall PT [id number] [facility]  
10/12/2018 11:30 AM Mg Isabel MMCPTPB [facility]  
10/15/2018 9:30 AM Kedar Hall PT [id number] [facility]  
 10/15/2018 10:15 AM Aan Fatumayolanda Grogg MMCPTPB [facility]  
10/17/2018 11:00 AM Gus Rodriguez, OTR/L MMCPTPB [facility]  
10/17/2018 12:00 PM Raymundo Saucedo, PTA MMCPTPB [facility]  
10/19/2018 10:45 AM Gus Rodriguez, OTR/L MMCPTPB [facility]  
10/19/2018 11:30 AM Karmen Chi, PT [id number] [facility]  
10/22/2018 10:30 AM Karmen Chi, PT [id number] [facility]  
10/22/2018 11:00 AM Luis DURANT [facility]  
10/24/2018 10:15 AM Ana Sewell CDBUCHM [facility]  
10/24/2018 11:00 AM Kj Abad, PT MMCPTPB [facility]  
10/26/2018 10:30 AM Karmen Chi, PT [id number] [facility]  
10/26/2018 11:00 AM Luis Mayers MMCPTPB [facility]  
10/29/2018 10:30 AM Morton Mortimer, PTA MMCPTPB [facility]  
10/29/2018 11:00 AM Luis Mayers MMCPTPB [facility]  
10/31/2018 10:30 AM Karmen Chi, PT [id number] [facility]  
10/31/2018 11:00 AM Luis Mayers [id number] [facility]  
11/2/2018 10:30 AM Karmen Chi, PT [id number] [facility]  
11/2/2018 11:15 AM Luis Giraldo [facility]CPTPB [facility]

## 2018-10-10 ENCOUNTER — HOSPITAL ENCOUNTER (OUTPATIENT)
Dept: PHYSICAL THERAPY | Age: 68
Discharge: HOME | End: 2018-10-10
Payer: COMMERCIAL

## 2018-10-10 PROCEDURE — 97110 THERAPEUTIC EXERCISES: CPT

## 2018-10-10 PROCEDURE — 97140 MANUAL THERAPY 1/> REGIONS: CPT

## 2018-10-10 PROCEDURE — 97535 SELF CARE MNGMENT TRAINING: CPT

## 2018-10-10 NOTE — PROGRESS NOTES
OT DAILY TREATMENT NOTE 10-18 Patient Name: Chung Segal Date:10/10/2018 : 1950 [x]  Patient  Verified Payor: BLUE CROSS / Plan: [facility] PPO / Product Type: PPO / In time:1100  Out time:1130 Total Treatment Time (min): 30 Visit #: 5 of 24 Medicare/BCBS Only Total Timed Codes (min):  30 1:1 Treatment Time:  30 Treatment Area: Muscle weakness of left upper extremity [M62.81] SUBJECTIVE Pain Level (0-10 scale): 0/10 Any medication changes, allergies to medications, adverse drug reactions, diagnosis change, or new procedure performed?: [x] No    [] Yes (see summary sheet for update) Subjective functional status/changes:   [] No changes reported 
I'm a little proud of my self OBJECTIVE 15 min Therapeutic Exercise:  [] See flow sheet :  
Rationale: increase ROM and increase strength to improve the patients ability to , functionally use Left UE Yellow Therabar: flexion, Extension, Supination, pronation, RD/UD 15 min Self Care/Home Management: UB/LB Dressing Rationale: increase ROM, increase strength and improve coordination  to improve the patients ability to dress independently UB Dressing: education on lex dressing technique, demonstration with Pt demo don/doff pullover shirt 2x 
LB Dressing: Education with use of reacher, demonstration with Pt demo don/doff simulated with theraband 2x With 
 [] TE 
 [] TA 
 [] neuro 
 [] other: Patient Education: [x] Review HEP [] Progressed/Changed HEP based on:  
[] positioning   [] body mechanics   [] transfers   [] heat/ice application  
[] Splint wear/care   [] Sensory re-education   [] scar management     
[] other:   
 
     
Other Objective/Functional Measures:  
 
UB Dressing: SBA with verbal Cueing LB Dressing: Min A with Verbal Cueing/Physical Assistance Pain Level (0-10 scale) post treatment: 0/10 ASSESSMENT/Changes in Function: Volitional movement/control improving Patient will continue to benefit from skilled OT services to modify and progress therapeutic interventions, address ROM deficits, address strength deficits, analyze and cue movement patterns and instruct in home and community integration to attain remaining goals. []  See Plan of Care 
[]  See progress note/recertification 
[]  See Discharge Summary Progress towards goals / Updated goals: 
Short Term Goals: To be accomplished in 2 weeks: 1.  Patient will be familiar with strategies to improve accuracy of scanning skills. Progressing 10/5/18 2.  Patient will be independent in home program for stretching left shoulder and elbow. Improving 10/5/18 3.  Patient will be able to place left hand accurately 8/10 trials to reduce injury risk. Met 10/9/18 
   
Long Term Goals: To be accomplished in [address] [place] [address] 
1.  Patient will be able to perform basic self care tasks at min assist level for safety issues only. Progressing 10/10/18 
2  .Massimoten tiwll be able to participate safely in home care tasks from wheelchair level. 3.  Patient iwll improve left hand  at least 10# and pinches at least 2-3# each for ease of fasteners and opening packages. Progressing 10/9/18 4.  Patient willl  report improve performance of home care ans self care as shown by increase in FOTO of at least 20 points. PLAN 
[]  Upgrade activities as tolerated     [x]  Continue plan of care 
[]  Update interventions per flow sheet      
[]  Discharge due to:_ 
[]  Other:_ TOMAS Brandt/LORENA 10/10/2018  11:04 AM 
 
Future Appointments Date Time Provider Wolf Peters 10/10/2018 11:30 AM Sergio Hermosillo PT [id number] [facility]  
10/12/2018 11:00 AM Sergio Hermosillo PT [id number] [facility]  
10/12/2018 11:30 AM Fritz Davis MMCPTPB [facility]  
10/15/2018 9:30 AM Sergio Hermosillo PT [id number] [facility]  
10/15/2018 10:15 AM Rick Scales Grogg MMCPTPB [facility]  
10/17/2018 11:00 AM Cyntha Felt, OTR/L [facility]CPTPB [facility]  
 10/17/2018 12:00 PM Ar Ackerman, PTA MMCPTPB [facility]  
10/19/2018 10:45 AM Kaitlin Shahid, OTR/L MMCPTPB [facility]  
10/19/2018 11:30 AM Martín Patterson, PT [id number] [facility]  
10/22/2018 10:30 AM Martín Patterson, PT [id number] [facility]  
10/22/2018 11:00 AM Kaiden Crystal [id number] [facility]  
10/24/2018 10:15 AM Ruben Gonzalez [id number] [facility]  
10/24/2018 11:00 AM Basil Shah, PT MMCPTPB [facility]  
10/26/2018 10:30 AM Martín Patterson, PT [id number] [facility]  
10/26/2018 11:00 AM Kaiden Crystal MMCPTPB [facility]  
10/29/2018 10:30 AM Faith Landers, PTA MMCPTPB [facility]  
10/29/2018 11:00 AM Kaiden Crystal MMCPTPB [facility]  
10/31/2018 10:30 AM Martín Patterson, PT [id number] [facility]  
10/31/2018 11:00 AM Kaiden Crystal XOLEGLT [facility]  
11/2/2018 10:30 AM Martín Patterson, PT [id number] [facility]  
11/2/2018 11:15 AM Kaiden Richards Mast MMCPTPB [facility]

## 2018-10-12 ENCOUNTER — APPOINTMENT (OUTPATIENT)
Dept: PHYSICAL THERAPY | Age: 68
End: 2018-10-12
Payer: COMMERCIAL

## 2018-10-15 ENCOUNTER — HOSPITAL ENCOUNTER (OUTPATIENT)
Dept: PHYSICAL THERAPY | Age: 68
Discharge: HOME | End: 2018-10-15
Payer: COMMERCIAL

## 2018-10-15 PROCEDURE — 97530 THERAPEUTIC ACTIVITIES: CPT

## 2018-10-15 PROCEDURE — 97110 THERAPEUTIC EXERCISES: CPT

## 2018-10-15 PROCEDURE — 97112 NEUROMUSCULAR REEDUCATION: CPT

## 2018-10-15 NOTE — PROGRESS NOTES
PT DAILY TREATMENT NOTE - [facility]  Patient Name: Chung Segal Date:10/15/2018 : 1950 [x]  Patient  Verified Payor: BLUE CROSS / Plan: [facility] PPO / Product Type: PPO / In time: 9:30  Out time: 10:00 Total Treatment Time (min):  30 Total Timed Codes (min):  30 
1 on 1 time: 30 minutes Visit #: 7 of 24 Treatment Area: Cerebral infarction, unspecified [I63.9] SUBJECTIVE Pain Level (0-10 scale): 0/10 Any medication changes, allergies to medications, adverse drug reactions, diagnosis change, or new procedure performed?: [x] No    [] Yes (see summary sheet for update) Subjective functional status/changes:   [] No changes reported Pt. Reports she is feeling about the same today OBJECTIVE 10 min Therapeutic Activity:  x-  See flow sheet : transfer training Rationale: increase strength, improve coordination and improve balance  to improve the patients ability to increase ease of ADLs 20 min Neuromuscular Re-education:  [x]  See flow sheet :  
Rationale: increase strength, improve coordination and improve balance  to improve the patients ability to increase ease of ADLs With 
 [x] TE 
 [] TA 
 [] neuro 
 [] other: Patient Education: [x] Review HEP [] Progressed/Changed HEP based on:  
[] positioning   [] body mechanics   [] transfers   [] heat/ice application   
[] other:   
 
Other Objective/Functional Measures:  
Min A for wheelchair to table and CGA for table to wheelchair transfers Min A for sit to supine transfer and CGA for supine to sit transfer Very minimal muscle activation in left hip flexors during transfers Mod A for ambulation in parallel bars and requires assistance to bring left leg fwd 
 
Pain Level (0-10 scale) post treatment: 0/10 ASSESSMENT/Changes in Function:  Pt. Is progressing slowly towards goals. She continues to require assistance during transfers. Patient will continue to benefit from skilled PT services to modify and progress therapeutic interventions, address functional mobility deficits, address ROM deficits, address strength deficits, analyze and address soft tissue restrictions, analyze and cue movement patterns, analyze and modify body mechanics/ergonomics and assess and modify postural abnormalities to attain remaining goals. Progress towards goals / Updated goals: 
Short Term Goals: To be accomplished in 1 weeks: 1. Patient will demonstrate compliance with HEP in order to improve left LE mobility for increased ease of transfers 
met 
[address] be accomplished in 6 weeks: 1. Patient will improve FOTO score by 13 points in order to demonstrate a significant improvement in function. 2. Patient will improve wheelchair to table transfers with SBA in order to increase ease of transfers at home. 3. Patient will perform sit to supine and supine to sit transfers with SBA in order to increase ease of transfers at home. Not met: min A for left UE management (10/10/18) 4. Patient will ambulate 20 feet with hemiwalker and CGA in order to increase ease of ambulation at home. PLAN 
[]  Upgrade activities as tolerated     [x]  Continue plan of care 
[]  Update interventions per flow sheet      
[]  Discharge due to:_ 
[]  Other:_ Katt Ashby PT 10/15/2018  9:26 AM 
 
Future Appointments Date Time Provider Wolf Peters 10/15/2018 9:30 AM Katt Ashby PT [id number] [facility]  
10/15/2018 10:15 AM Omar Delarosa MMCPTPB [facility]  
10/17/2018 11:00 AM Memo Barney, OTR/L MMCPTPB [facility]  
10/17/2018 12:00 PM Shoshana Hamman, PTA MMCPTPB [facility]  
10/19/2018 10:45 AM Memo Barney, OTR/L MMCPTPB [facility]  
10/19/2018 11:30 AM Katt Ashby PT [id number] [facility]  
10/22/2018 10:30 AM Katt Ashby PT [id number] [facility]  
10/22/2018 11:00 AM Brisa Krishnamurthymagalie [id number] [facility]  
10/24/2018 10:15 AM Trini Paez MMCPTPB [facility]  
 10/24/2018 11:00 AM Ted Damon, PT MMCPTPB [facility]  
10/26/2018 10:30 AM Isaac Hines, PT [id number] [facility]  
10/26/2018 11:00 AM Kit Grumet MMCPTPB [facility]  
10/29/2018 10:30 AM Jeison Caputo PTA MMCPTPB [facility]  
10/29/2018 11:00 AM Kit Grumet MMCPTPB [facility]  
10/31/2018 10:30 AM Isaac Hines, PT [id number] [facility]  
10/31/2018 11:00 AM Kit Grumet MMCPTPB [facility]  
11/2/2018 10:30 AM Isaac Hines, PT [id number] [facility]  
11/2/2018 11:15 AM Kit Grumet Megan Jones MMCPTPB [facility]

## 2018-10-15 NOTE — PROGRESS NOTES
OT DAILY TREATMENT NOTE 10-18 Patient Name: Mart Pate Date:10/15/2018 : 1950 [x]  Patient  Verified Payor: BLUE CROSS / Plan: [facility] PPO / Product Type: PPO / In time:1015  Out time:1100 Total Treatment Time (min): 45 Visit #: 6 of 24 Medicare/BCBS Only Total Timed Codes (min):  45 1:1 Treatment Time:  45  
 
Treatment Area: Muscle weakness of left upper extremity [M62.81] SUBJECTIVE Pain Level (0-10 scale): 0/10 Any medication changes, allergies to medications, adverse drug reactions, diagnosis change, or new procedure performed?: [x] No    [] Yes (see summary sheet for update) Subjective functional status/changes:   [] No changes reported I had an eye doctors appointment this morning OBJECTIVE 20 min Therapeutic Exercise:  [] See flow sheet :  
Rationale: increase ROM and increase strength to improve the patients ability to reach Swiss Ball: BUE Shoulder flexion, horizontal ab/adduction Yellow Therabar: 10x each 25 min Therapeutic Activity:  []  See flow sheet :  
Rationale: increase ROM, improve coordination and improving scanning  to improve the patients ability to perform ADL tasks Don/Doff XXL Pullover shirt Scanning Task-Bingo With 
 [] TE 
 [] TA 
 [] neuro 
 [] other: Patient Education: [x] Review HEP [] Progressed/Changed HEP based on:  
[] positioning   [] body mechanics   [] transfers   [] heat/ice application  
[] Splint wear/care   [] Sensory re-education   [] scar management     
[] other:   
 
     
Other Objective/Functional Measures:  
 
Verbal Cueing/Physical Assist required for therabar Don/Doff Pullover Shirt: Min A, Verbal cueing required Scanning: Min/Mod cueing with some missed numbers Pain Level (0-10 scale) post treatment: 0/10 ASSESSMENT/Changes in Function: Poor carryover with UB lex dressing technique Patient will continue to benefit from skilled OT services to modify and progress therapeutic interventions, address ROM deficits, address strength deficits, analyze and cue movement patterns and instruct in home and community integration to attain remaining goals. []  See Plan of Care 
[]  See progress note/recertification 
[]  See Discharge Summary Progress towards goals / Updated goals: 
Short Term Goals: To be accomplished in 2 weeks: 1.  Patient will be familiar with strategies to improve accuracy of scanning skills. Progressing 10/15/18 2.  Patient will be independent in home program for stretching left shoulder and elbow. Improving 10/15/18 3.  Patient will be able to place left hand accurately 8/10 trials to reduce injury risk. Met 10/9/18 
   
Long Term Goals: To be accomplished in [address] [place] [address] 
1.  Patient will be able to perform basic self care tasks at min assist level for safety issues only. Progressing 10/10/18 
2  .Ryan guzmanwll be able to participate safely in home care tasks from wheelchair level. 3.  Patient iwll improve left hand  at least 10# and pinches at least 2-3# each for ease of fasteners and opening packages. Progressing 10/9/18 4.  Patient willl  report improve performance of home care ans self care as shown by increase in FOTO of at least 20 points. PLAN 
[]  Upgrade activities as tolerated     [x]  Continue plan of care 
[]  Update interventions per flow sheet      
[]  Discharge due to:_ 
[]  Other:_ TOMAS Fraser/L 10/15/2018  10:12 AM 
 
Future Appointments Date Time Provider Wolf Peters 10/15/2018 10:15 AM Birdia Media MMCPTPB [facility]  
10/17/2018 11:00 AM Gypsy Herman, OTR/L MMCPTPB [facility]  
10/17/2018 12:00 PM Ioana Vides, PTA MMCPTPB [facility]  
10/19/2018 10:45 AM Gypsy Herman, OTR/L MMCPTPB [facility]  
10/19/2018 11:30 AM Elly Stiles PT [id number] [facility]  
10/22/2018 10:30 AM Elly Stiles PT [id number] [facility]  
10/22/2018 11:00 AM [employer] [facility]CPTPB [facility]  
 10/24/2018 10:15 AM Brisa Lr MMCPTPB [facility]  
10/24/2018 11:00 AM Shonna Kennedy, PT MMCPTPB [facility]  
10/26/2018 10:30 AM Katt Ashby, PT [id number] [facility]  
10/26/2018 11:00 AM Brisa Lr MMCPTPB [facility]  
10/29/2018 10:30 AM Virginia Rush PTA MMCPTPB [facility]  
10/29/2018 11:00 AM Brisa Lr MMCPTPB [facility]  
10/31/2018 10:30 AM Katt Ashby, PT [id number] [facility]  
10/31/2018 11:00 AM Brisa Lr MMCPTPB [facility]  
11/2/2018 10:30 AM Katt Ashby, PT [id number] [facility]  
11/2/2018 11:15 AM Brisa Paez MMCPTPB [facility]

## 2018-10-17 ENCOUNTER — HOSPITAL ENCOUNTER (OUTPATIENT)
Dept: PHYSICAL THERAPY | Age: 68
Discharge: HOME | End: 2018-10-17
Payer: COMMERCIAL

## 2018-10-17 PROCEDURE — 97112 NEUROMUSCULAR REEDUCATION: CPT

## 2018-10-17 PROCEDURE — 97530 THERAPEUTIC ACTIVITIES: CPT

## 2018-10-17 PROCEDURE — 97535 SELF CARE MNGMENT TRAINING: CPT

## 2018-10-17 NOTE — PROGRESS NOTES
OT DAILY TREATMENT NOTE 10-18 Patient Name: Shanthi Calderón Date:10/17/2018 : 1950 [x]  Patient  Verified Payor: BLUE CROSS / Plan: [facility] PPO / Product Type: PPO / In time:1100  Out time:1145 Total Treatment Time (min): 45 Visit #: 7 of 24 Medicare/BCBS Only Total Timed Codes (min):  45 1:1 Treatment Time:  45  
 
Treatment Area: Muscle weakness of left upper extremity [M62.81] SUBJECTIVE Pain Level (0-10 scale): 0/10 Any medication changes, allergies to medications, adverse drug reactions, diagnosis change, or new procedure performed?: [x] No    [] Yes (see summary sheet for update) Subjective functional status/changes:   [] No changes reported I need a break my shoulder is tired OBJECTIVE 20 min Therapeutic Activity:  []  See flow sheet :  
Rationale: increase ROM and improve coordination  to improve the patients ability to reach, grasp Scan board with cues to keep in sequence, one error in picking 15 for 5. Able to pinch to remove poker chips in all planes without dropping Marked difficulty with motor plan to place into bowl. Dowel tree to place washers with left hand no drops or difficulty Copying phone numbers large font no errors 15 min Neuromuscular Re-education:  []  See flow sheet :  
Rationale: increase ROM  to improve the patients ability to reach Guiding and scapular mobs to improve forward reach without abduction of shoulder Required mirror and elimination of right side movement to get left shoulder to elevate 
saebo tree levels 1-3 with guiding as needed to increase protraction 10 min Self Care/Home Management: Upper body dressing Rationale: increase ROM and improve coordination  to improve the patients ability to dress self Donned and doffed t shirt with no assit or cues Required verbal cueing for folding shirt With 
 [] TE 
 [] TA 
 [] neuro 
 [] other: Patient Education: [x] Review HEP   
 [] Progressed/Changed HEP based on: practice forward reach to pass cup 
[] positioning   [] body mechanics   [] transfers   [] heat/ice application  
[] Splint wear/care   [] Sensory re-education   [] scar management     
[] other:   
 
     
Other Objective/Functional Measures:  
Difficulty with placement of right hand without visual cueing No errors in copying numbers today Pain Level (0-10 scale) post treatment: 0/10 ASSESSMENT/Changes in Function: Improved reach with guiding** Patient will continue to benefit from skilled OT services to modify and progress therapeutic interventions, address ROM deficits, address strength deficits, analyze and address soft tissue restrictions, analyze and cue movement patterns and instruct in home and community integration to attain remaining goals. []  See Plan of Care 
[]  See progress note/recertification 
[]  See Discharge Summary Progress towards goals / Updated goals: 
Short Term Goals: To be accomplished in 2 weeks: 1.  Patient will be familiar with strategies to improve accuracy of scanning skills. Progressing 10/17/18 2.  Patient will be independent in home program for stretching left shoulder and elbow. Improving 10/15/18 3.  Patient will be able to place left hand accurately 8/10 trials to reduce injury risk. Met 10/9/18 
   
Long Term Goals: To be accomplished in [address] [place] [address] 
1.  Patient will be able to perform basic self care tasks at min assist level for safety issues only. Progressing 10/10/18, able to don and doff shirt mod I 10/17/18 
2  .Paitent iwll be able to participate safely in home care tasks from wheelchair level. 3.  Patient iwll improve left hand  at least 10# and pinches at least 2-3# each for ease of fasteners and opening packages. Progressing 10/9/18 4.  Patient willl  report improve performance of home care ans self care as shown by increase in FOTO of at least 20 points.  
 
 
 
PLAN 
 []  Upgrade activities as tolerated     []  Continue plan of care 
[]  Update interventions per flow sheet      
[]  Discharge due to:_ 
[]  Other:_ Amos Mahan, OTR/L 10/17/2018  10:59 AM 
 
Future Appointments Date Time Provider Wolf Lorraine 10/17/2018 11:00 AM Keily Hermosillo, OTR/L [facility] [address]  
10/17/2018 12:00 PM Italia Jacob [place] [facility] [address]  
10/19/2018 10:45 AM Keily Hermosillo, OTR/L [facility] [address]  
10/19/2018 11:30 AM Daniele Galdamez, PT [facility] [address]  
10/22/2018 10:30 AM Daniele Galdamez, PT [facility] [address]  
10/22/2018 11:00 AM Asia Laya JBZWSNX [address]  
10/24/2018 10:15 AM Asia Laya IYEALCK [address]  
10/24/2018 11:00 AM Tristan Mckinnon, PT [facility]CP[facility] [address]  
10/26/2018 10:30 AM Daniele Galdamez, PT SMQDQZS [address]  
10/26/2018 11:15 AM Asia Laya YEXXUCZ [address]  
10/29/2018 10:30 AM Kelsey Saenz, PTA [facility] [address]  
10/29/2018 11:00 AM Asia Laya QPRQQXE [address]  
10/31/2018 10:30 AM Daniele Galdamez, PT [facility] [address]  
10/31/2018 11:00 AM Asia Laya IRPZUFM [address]  
11/2/2018 10:30 AM Daniele Galdamez, PT [id number] [address]  
11/2/2018 11:15 AM Asia Asif [facility] [address]

## 2018-10-17 NOTE — PROGRESS NOTES
PT DAILY TREATMENT NOTE 10-18 Patient Name: Jonh Madden Date:10/17/2018 : 1950 [x]  Patient  Verified Payor: BLUE CROSS / Plan: [facility] PPO / Product Type: PPO / In time:12:01  Out time:12:32 Total Treatment Time (min): 31 Visit #: 7 of 24 Medicare/BCBS Only Total Timed Codes (min):  31 1:1 Treatment Time:  31  
 
 
Treatment Area: Cerebral infarction, unspecified [I63.9] SUBJECTIVE Pain Level (0-10 scale): 0/10 Any medication changes, allergies to medications, adverse drug reactions, diagnosis change, or new procedure performed?: [x] No    [] Yes (see summary sheet for update) Subjective functional status/changes:   [] No changes reported Pt reports being compliant with her HEP. OBJECTIVE 31 min Neuromuscular Re-education:  []  See flow sheet :  
Rationale: increase ROM, increase strength, improve coordination, improve balance and increase proprioception  to improve the patients ability to perform ADL's independently. With 
 [] TE 
 [] TA [x] neuro 
 [] other: Patient Education: [x] Review HEP [] Progressed/Changed HEP based on:  
[] positioning   [] body mechanics   [] transfers   [] heat/ice application   
[] other:   
 
Other Objective/Functional Measures: Completed Neuro re-ed per flow sheet. Pain Level (0-10 scale) post treatment: 0/10 ASSESSMENT/Changes in Function: Pt progressed Neuro re-ed evidenced by sit to stand increased repetitions. Patient will continue to benefit from skilled PT services to modify and progress therapeutic interventions, address functional mobility deficits, address ROM deficits, address strength deficits, analyze and address soft tissue restrictions, analyze and cue movement patterns and address imbalance/dizziness to attain remaining goals. [x]  See Plan of Care 
[]  See progress note/recertification 
[]  See Discharge Summary Progress towards goals / Updated goals: Short Term Goals: To be accomplished in 1 weeks: 1. Patient will demonstrate compliance with HEP in order to improve left LE mobility for increased ease of transfers 
met 
[address] [facility] be accomplished in 6 weeks: 1. Patient will improve FOTO score by 13 points in order to demonstrate a significant improvement in function. 2. Patient will improve wheelchair to table transfers with SBA in order to increase ease of transfers at home. 3. Patient will perform sit to supine and supine to sit transfers with SBA in order to increase ease of transfers at home. Not met: min A for left UE management (10/10/18) 4. Patient will ambulate 20 feet with hemiwalker and CGA in order to increase ease of ambulation at home.  
  
 
 
 
PLAN [x]  Upgrade activities as tolerated     [x]  Continue plan of care [x]  Update interventions per flow sheet      
[]  Discharge due to:_ 
[]  Other:_ Esteban Church PTA 10/17/2018  1:34 PM 
 
Future Appointments Date Time Provider Wolf Peters 10/19/2018 10:45 AM Sulaiman Ramesh OTR/LORENA [facility]  
10/19/2018 11:30 AM Delmi Mckeona, PT [facility]  
10/22/2018 10:30 AM Delmi Galdamez, PT [facility]  
10/22/2018 11:00 AM Milton Mason [id number] [facility]  
10/24/2018 10:15 AM Milton Mason [id number] [facility]  
10/24/2018 11:00 AM Kolton Miller, PT [facility]  
10/26/2018 10:30 AM Delmi Galdamez, PT [id number] [facility]  
10/26/2018 11:15 AM Milton Mason [email or web address] [facility]  
10/29/2018 10:30 AM Velton Lefort, PTA [facility]  
10/29/2018 11:00 AM Milton Mason ESKCENI [facility]  
10/31/2018 10:30 AM Delmi Galdamez, PT MMCPTP[facility]  
10/31/2018 11:00 AM Milton Mason UORCPVT [facility]  
11/2/2018 10:30 AM Delmi Galdamez, PT KUMDHSR [facility]  
11/2/2018 11:15 AM Milton Mason MMCPTPB [facility]

## 2018-10-19 ENCOUNTER — HOSPITAL ENCOUNTER (OUTPATIENT)
Dept: PHYSICAL THERAPY | Age: 68
Discharge: HOME | End: 2018-10-19
Payer: COMMERCIAL

## 2018-10-19 PROCEDURE — 97110 THERAPEUTIC EXERCISES: CPT

## 2018-10-19 PROCEDURE — 97112 NEUROMUSCULAR REEDUCATION: CPT

## 2018-10-19 PROCEDURE — 97530 THERAPEUTIC ACTIVITIES: CPT

## 2018-10-19 NOTE — PROGRESS NOTES
OT DAILY TREATMENT NOTE 10-18 Patient Name: Emanuel Perry Date:10/19/2018 : 1950 [x]  Patient  Verified Payor: BLUE CROSS / Plan: [facility] PPO / Product Type: PPO / In time:1100  Out time:1130 Total Treatment Time (min): 30 Visit #: 8 of 24 Medicare/BCBS Only Total Timed Codes (min):  30 1:1 Treatment Time:  30 Treatment Area: Muscle weakness of left upper extremity [M62.81] SUBJECTIVE Pain Level (0-10 scale): 0/10 Any medication changes, allergies to medications, adverse drug reactions, diagnosis change, or new procedure performed?: [x] No    [] Yes (see summary sheet for update) Subjective functional status/changes:   [] No changes reported I put two shirts on by myself OBJECTIVE 
  []redness  adverse reaction:  
 
 
10 min Therapeutic Activity:  []  See flow sheet :  
Rationale: improve coordination  to improve the patients ability to place with left hand 1 in peg place in resistive board x 10, then stacked 9 high for eye hand coordination and left hand use Varied items  and transfer to another plate with good success 1-2 inch items 10 therapeutic Exercise to encourage bilateral ahnd lemuel and reach Beach ball toss bilateral hands with verbal cues to use left hand too BUE 1# dowel to hit beach ball to opponent for improving atttention and use of left arm and UE strength 10 min Neuromuscular Re-education:  []  See flow sheet :  
Rationale: increase ROM and improve coordination  to improve the patients ability to reach Reaching activity with cup with supination with good success With 
 [] TE 
 [] TA 
 [] neuro 
 [] other: Patient Education: [x] Review HEP [] Progressed/Changed HEP based on:  
[] positioning   [] body mechanics   [] transfers   [] heat/ice application  
[] Splint wear/care   [] Sensory re-education   [] scar management     
[] other:   
 
     
Other Objective/Functional Measures: Able to reach with left hand to shoulder level with cup accurately, more difficulty with abducted reach Pain Level (0-10 scale) post treatment: 0/10 ASSESSMENT/Changes in Function: Improving praxi in reach and fine motor Patient will continue to benefit from skilled OT services to modify and progress therapeutic interventions, address ROM deficits, address strength deficits, analyze and cue movement patterns and instruct in home and community integration to attain remaining goals. []  See Plan of Care 
[]  See progress note/recertification 
[]  See Discharge Summary Progress towards goals / Updated goals: *1.  Patient will be familiar with strategies to improve accuracy of scanning skills. Progressing 10/17/18 2.  Patient will be independent in home program for stretching left shoulder and elbow. Improving 10/15/18 3.  Patient will be able to place left hand accurately 8/10 trials to reduce injury risk. Met 10/9/18 
   
Long Term Goals: To be accomplished in [address] [place] [address] 
1.  Patient will be able to perform basic self care tasks at min assist level for safety issues only. Progressing 10/10/18, able to don and doff shirt mod I 10/19/18 
2  .Paitent iwll be able to participate safely in home care tasks from wheelchair level. 3.  Patient iwll improve left hand  at least 10# and pinches at least 2-3# each for ease of fasteners and opening packages. Progressing 10/9/18 4.  Patient willl  report improve performance of home care ans self care as shown by increase in FOTO of at least 20 points. ** PLAN 
[]  Upgrade activities as tolerated     [x]  Continue plan of care 
[]  Update interventions per flow sheet      
[]  Discharge due to:_ 
[]  Other:_ VESNA Ridley/L 10/19/2018  11:15 AM 
 
Future Appointments Date Time Provider Wolf Peters 10/19/2018 11:30 AM Lindsay Qureshi PT [facility] [address]  
10/22/2018 10:30 AM Lindsay Qureshi PT [facility] [address]  
 10/22/2018 11:00 AM Henrine Hence [id number] [facility]  
10/24/2018 10:15 AM Henrine Hence [id number] [facility]  
10/24/2018 11:00 AM Isaak Bryson, PT [facility]  
10/26/2018 10:30 AM Alcira Chapa, PT [id number] [facility]  
10/26/2018 11:15 AM Henrine Hence [id number] [facility]  
10/29/2018 10:30 AM Yrn Jackson PTA [facility]  
10/29/2018 11:00 AM Henrine Hence HYRIYKY [facility]  
10/31/2018 10:30 AM Alcira Chapa, PT [facility]CP[facility]  
10/31/2018 11:00 AM Henrine Hence [id number] [facility]  
11/2/2018 10:30 AM Alcira Chapa, PT [id number] [facility]  
11/2/2018 11:15 AM Henrine Hence [facility]CPTPB [facility]

## 2018-10-19 NOTE — PROGRESS NOTES
PT DAILY TREATMENT NOTE - [facility]  Patient Name: Mark Bryant Date:10/19/2018 : 1950 [x]  Patient  Verified Payor: BLUE CROSS / Plan: [facility] PPO / Product Type: PPO / In time: 11:30  Out time: 12:00 Total Treatment Time (min):  30 Total Timed Codes (min):  30 Visit #: 8 of 24 Treatment Area: Cerebral infarction, unspecified [I63.9] SUBJECTIVE Pain Level (0-10 scale): 0/10 Any medication changes, allergies to medications, adverse drug reactions, diagnosis change, or new procedure performed?: [x] No    [] Yes (see summary sheet for update) Subjective functional status/changes:   [] No changes reported Pt. Reports she is doing pretty good today. She reports she is developing a sore on her buttocks that her family is aware of, so requested to not perform bed exercises today. OBJECTIVE 30 min Neuromuscular Re-education:  [x]  See flow sheet :  
Rationale: increase strength, improve coordination and improve balance  to improve the patients ability to increase ease of ambulation With 
 [x] TE 
 [] TA 
 [] neuro 
 [] other: Patient Education: [x] Review HEP [] Progressed/Changed HEP based on:  
[] positioning   [] body mechanics   [] transfers   [] heat/ice application   
[] other:   
 
Other Objective/Functional Measures:  
Pt. Continues to require assistance with moving left LE fwd in parallel bars She requires facilitation to improve weight shift to left side Pt. Was able to bring left foot fwd on her own using pillow case around her foot to reduce friction Pain Level (0-10 scale) post treatment:  0/10 ASSESSMENT/Changes in Function:  Pt. Is making limited progress towards goals. She continues to have significant decrease in left LE strength which is affecting her ambulation. She continues to have difficulty weight bearing through left LE during sit to stand transfers. Patient will continue to benefit from skilled PT services to modify and progress therapeutic interventions, address functional mobility deficits, address ROM deficits, address strength deficits, analyze and address soft tissue restrictions, analyze and cue movement patterns, analyze and modify body mechanics/ergonomics and assess and modify postural abnormalities to attain remaining goals. Progress towards goals / Updated goals: 
Short Term Goals: To be accomplished in 1 weeks: 1. Patient will demonstrate compliance with HEP in order to improve left LE mobility for increased ease of transfers 
met 
[address] [facility] [address] be accomplished in 6 weeks: 1. Patient will improve FOTO score by 13 points in order to demonstrate a significant improvement in function. 2. Patient will improve wheelchair to table transfers with SBA in order to increase ease of transfers at home. 3. Patient will perform sit to supine and supine to sit transfers with SBA in order to increase ease of transfers at home. Not met: min A for left UE management (10/10/18) 4. Patient will ambulate 20 feet with hemiwalker and CGA in order to increase ease of ambulation at home.  
Min A for left LE in parallel bars (10/19/18) PLAN 
[]  Upgrade activities as tolerated     [x]  Continue plan of care 
[]  Update interventions per flow sheet      
[]  Discharge due to:_ 
[]  Other:_ Elly Stiles PT 10/19/2018  12:04 PM 
 
Future Appointments Date Time Provider Wolf Peters 10/22/2018 10:30 AM Kimberly Goff PT [facility]  
10/22/2018 11:00 AM Viri Tejeda PURRDVG [facility]  
10/24/2018 10:15 AM Viri Tejeda [id number] [facility]  
10/24/2018 11:00 AM Keya Dunham PT [id number] [facility]  
10/26/2018 10:30 AM Kimberly Goff PT [id number] [facility]  
10/26/2018 11:15 AM Viri Tejeda FGDITOO [facility]  
10/29/2018 10:30 AM Power Kimble PTA [facility]  
10/29/2018 11:00 AM Viri Tejeda HGKQJIS [facility]  
 10/31/2018 10:30 AM Gabriela Ndiaye, PT MMCPTPB [facility]  
10/31/2018 11:00 AM Lien PATHAKYYFEW [facility]  
11/2/2018 10:30 AM Gabriela Ndiaye, PT [id number] [facility]  
11/2/2018 11:15 AM Lien HELMSPTPB [facility]

## 2018-10-22 ENCOUNTER — HOSPITAL ENCOUNTER (OUTPATIENT)
Dept: PHYSICAL THERAPY | Age: 68
Discharge: HOME | End: 2018-10-22
Payer: COMMERCIAL

## 2018-10-22 PROCEDURE — 97530 THERAPEUTIC ACTIVITIES: CPT

## 2018-10-22 PROCEDURE — 97112 NEUROMUSCULAR REEDUCATION: CPT

## 2018-10-22 NOTE — PROGRESS NOTES
OT DAILY TREATMENT NOTE 10-18 Patient Name: Logan Allen Date:10/22/2018 : 1950 [x]  Patient  Verified Payor: BLUE CROSS / Plan: [facility] PPO / Product Type: PPO / In time:1100  Out time:40 Total Treatment Time (min): 40 Visit #: 9 of 24 Medicare/BCBS Only Total Timed Codes (min):  40 1:1 Treatment Time:  40 Treatment Area: Muscle weakness of left upper extremity [M62.81] SUBJECTIVE Pain Level (0-10 scale): 0/10 Any medication changes, allergies to medications, adverse drug reactions, diagnosis change, or new procedure performed?: [x] No    [] Yes (see summary sheet for update) Subjective functional status/changes:   [] No changes reported I have been using it, not for cooking or anything, but I've been trying. Pt reported taking her medications late this morning OBJECTIVE 40 min Therapeutic Activity:  []  See flow sheet :  
Rationale: improve coordination  to improve the patients ability to place with left hand 1 in peg place in resistive board stacked 9 high for eye hand coordination and left hand use 1 in peg place into resistive peg board 32x Strategic Peg Game: focus on eye hand coordination, motor planning With 
 [] TE 
 [] TA 
 [] neuro 
 [] other: Patient Education: [x] Review HEP [] Progressed/Changed HEP based on:  
[] positioning   [] body mechanics   [] transfers   [] heat/ice application  
[] Splint wear/care   [] Sensory re-education   [] scar management     
[] other:   
 
     
Other Objective/Functional Measures:  
Frequent rest breaks with 1 in peg placement activity, increased time required Difficulty with accuracy  During peg task Pain Level (0-10 scale) post treatment: 0/10 ASSESSMENT/Changes in Function: difficulty with overall session, increased need for rest breaks required Patient will continue to benefit from skilled OT services to modify and progress therapeutic interventions, address ROM deficits, address strength deficits, analyze and cue movement patterns and instruct in home and community integration to attain remaining goals. []  See Plan of Care 
[]  See progress note/recertification 
[]  See Discharge Summary Progress towards goals / Updated goals: 1.  Patient will be familiar with strategies to improve accuracy of scanning skills. Progressing 10/17/18 2.  Patient will be independent in home program for stretching left shoulder and elbow. Improving 10/15/18 3.  Patient will be able to place left hand accurately 8/10 trials to reduce injury risk. Met 10/9/18 
   
Long Term Goals: To be accomplished in [address] [place] [address] 
1.  Patient will be able to perform basic self care tasks at min assist level for safety issues only. Progressing 10/10/18, able to don and doff shirt mod I 10/19/18 
2  .[employer] iwll be able to participate safely in home care tasks from wheelchair level. 3.  Patient iwll improve left hand  at least 10# and pinches at least 2-3# each for ease of fasteners and opening packages. Progressing 10/9/18 4.  Patient willl  report improve performance of home care ans self care as shown by increase in FOTO of at least 20 points. 
  
 
 
 
PLAN 
[]  Upgrade activities as tolerated     [x]  Continue plan of care 
[]  Update interventions per flow sheet      
[]  Discharge due to:_ 
[]  Other:_ MAICOL Jeffery 10/22/2018  9:29 AM 
 
Future Appointments Date Time Provider Wolf Peters 10/22/2018 10:30 AM COLIN London [facility]  
10/22/2018 11:00 AM Igor Sherman QHYCGMZ [facility]  
10/24/2018 10:15 AM Igor Sherman VFTSTMS [facility]  
10/24/2018 11:00 AM COLIN SilverPTPB [facility]  
10/26/2018 10:30 AM Pillo Trujillo PT [id number] [facility]  
10/26/2018 11:15 AM Igor Sherman KTVUOYD [facility]  
10/29/2018 10:30 AM Edelmira Myers PTA MMCPTPB [facility]  
 10/29/2018 11:00 AM Jayashree Mallory YANHKVU [facility]  
10/31/2018 10:30 AM Lesly Chappell, PT MMCPTPB [facility]  
10/31/2018 11:00 AM Jayashree Mallory [id number] [facility]  
11/2/2018 10:30 AM Lesly Chappell, PT [id number] [facility]  
11/2/2018 11:15 AM Jayashree Mallory MMCPTPB [facility]

## 2018-10-22 NOTE — PROGRESS NOTES
PT DAILY TREATMENT NOTE - [facility]  Patient Name: Shannon Myles Date:10/22/2018 : 1950 [x]  Patient  Verified Payor: BLUE CROSS / Plan: [facility] PPO / Product Type: PPO / In time: 10:30  Out time: 11:00 Total Treatment Time (min):  30 Total Timed Codes (min):  30 
1 on 1 tme: 30 minutes Visit #: 9 of 24 Treatment Area: Cerebral infarction, unspecified [I63.9] SUBJECTIVE Pain Level (0-10 scale): 0/10 Any medication changes, allergies to medications, adverse drug reactions, diagnosis change, or new procedure performed?: [x] No    [] Yes (see summary sheet for update) Subjective functional status/changes:   [] No changes reported Pt. Reports she is doing pretty good. She reports having an easier time getting into bed at home but continues to need assistance with her left leg. OBJECTIVE 30 min Neuromuscular Re-education:  []  See flow sheet : standing balance activities, facilitated ambulation in parallel bars, facilitated sit to stand transfers Rationale: increase strength, improve coordination and improve balance  to improve the patients ability to increase ease of ADLs With 
 [] TE 
 [] TA [x] neuro 
 [] other: Patient Education: [x] Review HEP [] Progressed/Changed HEP based on:  
[] positioning   [] body mechanics   [] transfers   [] heat/ice application   
[] other:   
 
Other Objective/Functional Measures:  
Pt. Tolerated PT well but required frequent breaks secondary to fatigue and she felt like her heart was racing since medication didn't kick in yet She was challenged with eyes closed [place] balance She continues to require assistance or pillow case on left LE to bring fwd during ambulation Pain Level (0-10 scale) post treatment: 0/10 ASSESSMENT/Changes in Function: pt. Is making limited progress towards goals. She continues to have significant decrease Patient will continue to benefit from skilled PT services to modify and progress therapeutic interventions, address functional mobility deficits, address ROM deficits, address strength deficits, analyze and address soft tissue restrictions, analyze and cue movement patterns and analyze and modify body mechanics/ergonomics to attain remaining goals. Progress towards goals / Updated goals: 
Short Term Goals: To be accomplished in 1 weeks: 1. Patient will demonstrate compliance with HEP in order to improve left LE mobility for increased ease of transfers 
met 
[address] be accomplished in 6 weeks: 1. Patient will improve FOTO score by 13 points in order to demonstrate a significant improvement in function. 2. Patient will improve wheelchair to table transfers with SBA in order to increase ease of transfers at home. 3. Patient will perform sit to supine and supine to sit transfers with SBA in order to increase ease of transfers at home. Not met: min A for left UE management (10/10/18) 4. Patient will ambulate 20 feet with hemiwalker and CGA in order to increase ease of ambulation at home.  
Min A for left LE in parallel bars (10/19/18) PLAN 
[]  Upgrade activities as tolerated     [x]  Continue plan of care 
[]  Update interventions per flow sheet      
[]  Discharge due to:_ 
[]  Other:_ Frances Reynoso, PT 10/22/2018  11:03 AM 
 
Future Appointments Date Time Provider Wolf Peters 10/24/2018 10:15 AM Tomasa Haider [id number] [facility]  
10/24/2018 11:00 AM Taylor Lowe PT [id number] [facility]  
10/26/2018 10:30 AM Tello Vines PT [id number] [facility]  
10/26/2018 11:15 AM Tomasa Haider [id number] [facility]  
10/29/2018 10:30 AM Viridiana Sinha PTA MMCPTPB [facility]  
10/29/2018 11:00 AM Tomasa Haider [id number] [facility]  
10/31/2018 10:30 AM Tello Vines PT [id number] [facility]  
10/31/2018 11:00 AM Tomasa Haider [id number] [facility]  
 11/2/2018 10:30 AM Artie Gonzalez PT [id number] [facility]  
11/2/2018 11:15 AM Romelia Peña [id number] [facility]

## 2018-10-24 ENCOUNTER — HOSPITAL ENCOUNTER (OUTPATIENT)
Dept: PHYSICAL THERAPY | Age: 68
Discharge: HOME | End: 2018-10-24
Payer: COMMERCIAL

## 2018-10-24 PROCEDURE — 97110 THERAPEUTIC EXERCISES: CPT

## 2018-10-24 PROCEDURE — 97112 NEUROMUSCULAR REEDUCATION: CPT

## 2018-10-24 PROCEDURE — 97116 GAIT TRAINING THERAPY: CPT

## 2018-10-24 PROCEDURE — 97530 THERAPEUTIC ACTIVITIES: CPT

## 2018-10-24 NOTE — PROGRESS NOTES
[facility] 
[phone] fax Occupational Therapy Progress Note Patient name: Romana Thao Start of Care: 10/1/18 Referral source: Albina Davis MD : 1950 Medical/Treatment Diagnosis: Muscle weakness of left upper extremity [M62.81] Onset Date:2018 Prior Hospitalization: see medical history Provider#: [id number] Medications: Verified on Patient Summary List   
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 
[x] Increased ROM   []  []  [x]  [] 
[x] Increased Strength  []  []  [x]  [] 
[] Increased Mobility  []  []  []  []  
[] Decreased Pain   []  []  []  [] 
[] Decreased Swelling  []  []  []  [] 
[] Increased Fine Motor Skills []  [x]  []  [] 
[x] Increased ADL [place] [x]  []  []  [] 
 
Key Functional Changes: Improved self care independence with patient able to do dressing at min assist level. She is able to place hand accurately for larger items, but not for smaller  Items so is still at risk for knocking things over etc.   
 
  Current strength with prior in ( )          
  Gross Grasp 3pt Pinch Lateral Pinch Tip Pinch Right  50 11 14 12 Left 10 (10) 5(4) 7 (6) 4 (4) Neuro quality of Life Impairment 595 ( was 66%)  
 
Prior goals and progress: 1.  Patient will be familiar with strategies to improve accuracy of scanning skills. Progressing 10/17/18 2.  Patient will be independent in home program for stretching left shoulder and elbow. Improving 10/15/18 3.  Patient will be able to place left hand accurately 8/10 trials to reduce injury risk.  Met 10/9/18 
   
Long Term Goals: To be accomplished in [address] [place] [address] 
1.  Patient will be able to perform basic self care tasks at min assist level for safety issues only. Goal Met 10/24/18 
2  .Paitent iwll be able to participate safely in home care tasks from wheelchair level. 3.  Patient iwll improve left hand  at least 10# and pinches at least 2-3# each for ease of fasteners and opening packages. Progressing 10/9/18 4.  Patient willl  report improve performance of home care ans self care as shown by increase in FOTO of at least 20 points Updated Goals: to be achieved in 4 weeks: 1. Patient will report improved left hand use as shown by reduced limitation on Neuro Quality of Life by at least 20% ( initially 66%). 2.  Patient will be able to perform accurate reach and grasp of 1 inch and smaller items with left hand to be able to grasp items in home without knocking over and increase safety. 3.  Patient will be able to reach with left hand to place items in dresser, in fridge on counter etc. 
4.  Patient will improve  and pinches in left hand by 10# and 2-3# to help with opening packages and containers. 5.  Patient will be independent in home program to improve left shoulder ROM and true forward flexion. ASSESSMENT/RECOMMENDATIONS: 
[x]Continue therapy per initial plan/protocol at a frequency of  3 x per week for 4 weeks []Continue therapy with the following recommended changes:_____________________      _____________________________________________________________________ []Discontinue therapy progressing towards or have reached established goals []Discontinue therapy due to lack of appreciable progress towards goals []Discontinue therapy due to lack of attendance or compliance []Await Physician's recommendations/decisions regarding therapy []Other:________________________________________________________________ Thank you for this referral.  
Jonathan Moe, OTR/L 10/24/2018 2:08 PM 
NOTE TO PHYSICIAN:  PLEASE COMPLETE THE ORDERS BELOW AND  
FAX TO [facility]: [phone] If you are unable to process this request in 24 hours please contact our office: [phone] ? I have read the above report and request that my patient continue as recommended. ? I have read the above report and request that my patient continue therapy with the following changes/special instructions:________________________________________________________ ? I have read the above report and request that my patient be discharged from therapy.  
 
[id number] Signature:____________Date:_________TIME:________ 
 
[facility], Date and Time must be completed for valid certification **

## 2018-10-24 NOTE — PROGRESS NOTES
PT DAILY TREATMENT NOTE 10-18 Patient Name: Rell Levin Date:10/24/2018 : 1950 [x]  Patient  Verified Payor: BLUE CROSS / Plan: [facility] PPO / Product Type: PPO / In time:1100  Out time:1130 Total Treatment Time (min): 30 Visit #: 10 of 24 Medicare/BCBS Only Total Timed Codes (min):  30 1:1 Treatment Time:  30 Treatment Area: Cerebral infarction, unspecified [I63.9] SUBJECTIVE Pain Level (0-10 scale): 0 Any medication changes, allergies to medications, adverse drug reactions, diagnosis change, or new procedure performed?: [x] No    [] Yes (see summary sheet for update) Subjective functional status/changes:   [x] No changes reported Patient reports she is doing fine. OBJECTIVE 20 min Neuromuscular Re-education:  [x]  See flow sheet :  
Rationale: increase ROM, increase strength, improve coordination, improve balance and increase proprioception  to improve the patients ability to increase ease of transfers and mobility. 10 min Gait Trainin feet holding onto parallel bars with B UE's with min A level of assist to clear left LE during swing phase of gait. Rationale: improve ease and safety of ambulation With 
 [] TE 
 [] TA 
 [] neuro 
 [] other: Patient Education: [x] Review HEP [] Progressed/Changed HEP based on:  
[] positioning   [] body mechanics   [] transfers   [] heat/ice application   
[] other:   
 
Other Objective/Functional Measures:  
Patient fatigues during standing exercises, requires 1 seated rest break. Has difficulty clearing left LE during gait inside parallel bars, needing min A Patient able to perform WC to table transfers with supervision, transfers to stronger side ( right) Pain Level (0-10 scale) post treatment: 0 
 
ASSESSMENT/Changes in Function: Patient making slow progress towards long term goals.  She presents with decreased endurance, left LE weakness, and impaired standing balance limiting ability to ambulate and perform transfers. Patient will continue to benefit from skilled PT services to modify and progress therapeutic interventions, address functional mobility deficits, address ROM deficits, address strength deficits, analyze and cue movement patterns, analyze and modify body mechanics/ergonomics, assess and modify postural abnormalities, address imbalance/dizziness and instruct in home and community integration to attain remaining goals. []  See Plan of Care [x]  See progress note/recertification 
[]  See Discharge Summary Progress towards goals / Updated goals: 
Long Term Goals: To be accomplished in 6 weeks: 1. Patient will improve FOTO score by 13 points in order to demonstrate a significant improvement in function. 2. Patient will improve wheelchair to table transfers with SBA in order to increase ease of transfers at home. 3. Patient will perform sit to supine and supine to sit transfers with SBA in order to increase ease of transfers at home. Not met: min A for left UE management (10/10/18) 4. Patient will ambulate 20 feet with hemiwalker and CGA in order to increase ease of ambulation at home.  
Min A for left LE in parallel bars (10/19/18) PLAN 
[]  Upgrade activities as tolerated     [x]  Continue plan of care 
[]  Update interventions per flow sheet      
[]  Discharge due to:_ 
[]  Other:_   
 
Ted Damon PT 10/24/2018  11:06 AM 
 
Future Appointments Date Time Provider Wolf Peters 10/26/2018 10:30 AM Wade Mendez PT [id number] [facility]  
10/26/2018 11:15 AM Dell Layer [id number] [facility]  
10/29/2018 10:30 AM Maxi Mehta PTA MMCPTPB [facility]  
10/29/2018 11:00 AM Dell Layer [id number] [facility]  
10/31/2018 10:30 AM COLIN OsunaPTPB [facility]  
10/31/2018 11:00 AM Dell Layer [id number] [facility]  
11/2/2018 10:30 AM COLIN OsunaPTPB [facility]  
 11/2/2018 11:15 AM Kirstin Zhao [facility]CPTPB [facility]

## 2018-10-24 NOTE — PROGRESS NOTES
OT DAILY TREATMENT NOTE 10-18 Patient Name: Sharda Ponce Date:10/24/2018 : 1950 [x]  Patient  Verified Payor: BLUE CROSS / Plan: [facility] PPO / Product Type: PPO / In time:1015  Out time:1100 Total Treatment Time (min): 45 Visit #: 10 of 24 Medicare/BCBS Only Total Timed Codes (min):  45 1:1 Treatment Time:  45  
 
Treatment Area: Muscle weakness of left upper extremity [M62.81] SUBJECTIVE Pain Level (0-10 scale): 0/10 Any medication changes, allergies to medications, adverse drug reactions, diagnosis change, or new procedure performed?: [x] No    [] Yes (see summary sheet for update) Subjective functional status/changes:   [] No changes reported I took my  Medications early this morning and had breakfast. 
 
OBJECTIVE 15 min Therapeutic Exercise:  [] See flow sheet :  
Rationale: increase ROM and increase strength to improve the patients ability to reach Monegasque Ball: BUE::Shoulder flexion with scapular protraction, horizontal abduction/adduction Recheck: , All pinches 30 min Therapeutic Activity:  []  See flow sheet :  
Rationale: increase ROM and improve coordination  to improve the patients ability to reach, grasp/release 1 in pegs:  
 Placed 10 pegs using Left UE 
 FM task for removal with focus on accuracy- Approx 35% errors With 
 [] TE 
 [] TA 
 [] neuro 
 [] other: Patient Education: [x] Review HEP [] Progressed/Changed HEP based on:  
[] positioning   [] body mechanics   [] transfers   [] heat/ice application  
[] Splint wear/care   [] Sensory re-education   [] scar management     
[] other:   
 
     
Other Objective/Functional Measures:  
  
  Gross Grasp 3pt Pinch Lateral Pinch Tip Pinch Right  50 11 14 12 Left 10 (10) 5(4) 7 (6) 4 (4) FOTO: NeuroQOL: 59% (66%) Pain Level (0-10 scale) post treatment: 0/10 ASSESSMENT/Changes in Function: Minimal to no change with strength at this time, FM accuracy improving Patient will continue to benefit from skilled OT services to modify and progress therapeutic interventions, address ROM deficits, address strength deficits, analyze and cue movement patterns and instruct in home and community integration to attain remaining goals. []  See Plan of Care [x]  See progress note/recertification 
[]  See Discharge Summary Progress towards goals / Updated goals: 1.  Patient will be familiar with strategies to improve accuracy of scanning skills. Progressing 10/17/18 2.  Patient will be independent in home program for stretching left shoulder and elbow. Improving 10/15/18 3.  Patient will be able to place left hand accurately 8/10 trials to reduce injury risk. Met 10/9/18 
   
Long Term Goals: To be accomplished in [address] [place] [address] 
1.  Patient will be able to perform basic self care tasks at min assist level for safety issues only. Goal Met 10/24/18 
2  .Paitent iwll be able to participate safely in home care tasks from wheelchair level. 3.  Patient iwll improve left hand  at least 10# and pinches at least 2-3# each for ease of fasteners and opening packages. Progressing 10/9/18 4.  Patient willl  report improve performance of home care ans self care as shown by increase in FOTO of at least 20 points. PLAN 
[]  Upgrade activities as tolerated     [x]  Continue plan of care 
[]  Update interventions per flow sheet      
[]  Discharge due to:_ 
[]  Other:_ MAICOL Nice 10/24/2018  10:23 AM 
 
Future Appointments Date Time Provider Wolf Peters 10/24/2018 11:00 AM Rita Downing, PT [id number] [facility]  
10/26/2018 10:30 AM Fareed Ortiz, PT [id number] [facility]  
10/26/2018 11:15 AM Elsa Blakely [id number] [facility]  
10/29/2018 10:30 AM Debra Montiel, PTA MMCPTPB [facility]  
10/29/2018 11:00 AM Elsa Blakely MPBSRWY [facility]  
10/31/2018 10:30 AM Fareed Ortiz, PT MMCPTPB [facility]  
 10/31/2018 11:00 AM Tia Gilliam KODYXUZ [facility]  
11/2/2018 10:30 AM Lindsay Qureshi PT [id number] [facility]  
11/2/2018 11:15 AM Tia Gilliam MMCPTPB [facility]

## 2018-10-24 NOTE — PROGRESS NOTES
[facility] [address] [facility] 
[phone] fax Physical Therapy Progress Note Patient name: Romana Thao Start of Care: 10/1/2018 Referral source: Albina Davis MD : 1950 Medical Diagnosis: Cerebral infarction, unspecified [I63.9] 
  Onset Date: 2018 Treatment Diagnosis: CVA, difficulty with ambulation Prior Hospitalization: see medical history Provider#: [id number] Medications: Verified on Patient summary List  
 Comorbidities: HTN Prior Level of Function: Ind with ambulation, working, Ind with ADLs Visits from Start of Care: 10    Missed Visits: 0 Established Goals:         Excellent           Good         Limited           None 
[] Increased ROM   []  []  []  [] 
[x] Increased Strength  []  []  [x]  [] 
[x] Increased Mobility  []  []  [x]  []  
[] Decreased Pain   []  []  []  [] 
[] Decreased Swelling  []  []  []  [] 
 
Key Functional Changes: Patient has attending 10 physical therapy sessions and is making limited progress towards long term goals. She was able to achieve 1/4 long term goals; is able to perform wheelchair to table transfers with SBA towards stronger side ( right). She continues to present with decreased left LE strength, decreased endurance, and poor standing balance limiting her ability to complete transfers and ambulate safely. Patient will continue to benefit from skilled physical therapy to address stated deficits and continue to work towards long term goals. Updated Goals: to be achieved in 4 weeks: 
 Long Term Goals: To be accomplished in 6 weeks: 1. Patient will improve FOTO score by 13 points in order to demonstrate a significant improvement in function. Status at last note/certification: 34 points at evaluation, will reassess at next visit Current: 2.  Patient will perform sit to supine and supine to sit transfers with SBA in order to increase ease of transfers at home. Status at last note/certification: min A for left UE management Current: 3. Patient will ambulate 20 feet with hemiwalker and CGA in order to increase ease of ambulation at home.  
Status at last note/certification: Min A to manage left LE inside parallel bars Current: 
 
 
ASSESSMENT/RECOMMENDATIONS: 
[]Continue therapy per initial plan/protocol at a frequency of  x per week for weeks [x]Continue therapy with the following recommended changes:__2-3x per week for 4 weeks___________________      _____________________________________________________________________ []Discontinue therapy progressing towards or have reached established goals []Discontinue therapy due to lack of appreciable progress towards goals []Discontinue therapy due to lack of attendance or compliance []Await Physician's recommendations/decisions regarding therapy []Other:________________________________________________________________ Thank you for this referral.  
Bia Castellon, PT 10/24/2018 3:35 PM 
 
NOTE TO PHYSICIAN:  PLEASE COMPLETE THE ORDERS BELOW AND  
FAX TO [facility]: [phone] If you are unable to process this request in 24 hours please contact our office: [phone] ? I have read the above report and request that my patient continue as recommended. ? I have read the above report and request that my patient continue therapy with the following changes/special instructions:____________________________________ ? I have read the above report and request that my patient be discharged from therapy. Physicians signature: ______________________________Date: ______Time:______

## 2018-10-26 ENCOUNTER — HOSPITAL ENCOUNTER (OUTPATIENT)
Dept: PHYSICAL THERAPY | Age: 68
Discharge: HOME | End: 2018-10-26
Payer: COMMERCIAL

## 2018-10-26 PROCEDURE — 97530 THERAPEUTIC ACTIVITIES: CPT

## 2018-10-26 PROCEDURE — 97112 NEUROMUSCULAR REEDUCATION: CPT

## 2018-10-26 PROCEDURE — 97110 THERAPEUTIC EXERCISES: CPT

## 2018-10-26 NOTE — PROGRESS NOTES
OT DAILY TREATMENT NOTE 10-18 Patient Name: Joan Merchant Date:10/26/2018 : 1950 [x]  Patient  Verified Payor: BLUE CROSS / Plan: [facility] PPO / Product Type: PPO / In time:1112  Out time:1152 Total Treatment Time (min): 40 Visit #: 19 ZI 51 Medicare/BCBS Only Total Timed Codes (min):  40 1:1 Treatment Time:  40 Treatment Area: Muscle weakness of left upper extremity [M62.81] SUBJECTIVE Pain Level (0-10 scale): 0/10 Any medication changes, allergies to medications, adverse drug reactions, diagnosis change, or new procedure performed?: [x] No    [] Yes (see summary sheet for update) Subjective functional status/changes:   [] No changes reported Sometimes I just need a little break OBJECTIVE 15 min Therapeutic Exercise:  [] See flow sheet :  
Rationale: increase ROM and increase strength to improve the patients ability to reach,  Graded Clothes Pins: 3pt pinch with 2#, 4# to  puff balls Yellow therabar:3 ways 10x 
 
 
25 min Therapeutic Activity:  []  See flow sheet :  
Rationale: increase ROM and improve coordination  to improve the patients ability to grab/place items accurately 1 in colored cubes: Pt used Left hand to grab color coordinated blocks for placement. Pt encouraged to perform forward reach when placing/removing appropriate items. With 
 [] TE 
 [] TA 
 [] neuro 
 [] other: Patient Education: [x] Review HEP [] Progressed/Changed HEP based on:  
[] positioning   [] body mechanics   [] transfers   [] heat/ice application  
[] Splint wear/care   [] Sensory re-education   [] scar management     
[] other:   
 
     
Other Objective/Functional Measures:  
 
Cube Accuracy: 86% - 36, increased time required with verbal cueing and physical assist for forward reaching with elbow extension Pain Level (0-10 scale) post treatment: 0/10 ASSESSMENT/Changes in Function: improved fm accuracy Patient will continue to benefit from skilled OT services to modify and progress therapeutic interventions, address ROM deficits, address strength deficits, analyze and cue movement patterns and instruct in home and community integration to attain remaining goals. []  See Plan of Care 
[]  See progress note/recertification 
[]  See Discharge Summary Progress towards goals / Updated goals: 
Updated Goals: to be achieved in 4 weeks: 1. Patient will report improved left hand use as shown by reduced limitation on Neuro Quality of Life by at least 20% ( initially 66%). 2.  Patient will be able to perform accurate reach and grasp of 1 inch and smaller items with left hand to be able to grasp items in home without knocking over and increase safety. Progressing with in clinic activities 10/26/18 3. Patient will be able to reach with left hand to place items in dresser, in fridge on counter etc. Progressing with in clinic activities 10/26/18 4. Patient will improve  and pinches in left hand by 10# and 2-3# to help with opening packages and containers. Progressing with graded clothes pins, therabar 10/26/18 
5. Patient will be independent in home program to improve left shoulder ROM and true forward flexion. PLAN 
[]  Upgrade activities as tolerated     [x]  Continue plan of care 
[]  Update interventions per flow sheet      
[]  Discharge due to:_ 
[]  Other:_ MAICOL Prieto 10/26/2018  12:20 PM 
 
Future Appointments Date Time Provider Wolf Peters 10/29/2018 10:30 AM Jaja Kemp, PTA MMCPTPB [facility]  
10/29/2018 11:00 AM Jayashree Mallory [id number] [facility]  
10/31/2018 10:30 AM Lesly Chappell, COLIN MMCPTPB [facility]  
10/31/2018 11:00 AM Jayashree Mallory [id number] [facility]  
11/2/2018 10:30 AM Lesly Chappell, COLIN [id number] [facility]  
11/2/2018 11:15 AM Jayashree Mallory MMCPTPB [facility]

## 2018-10-26 NOTE — PROGRESS NOTES
PT DAILY TREATMENT NOTE 10-18 Patient Name: Yadi Salazar Date:10/26/2018 : 1950 [x]  Patient  Verified Payor: BLUE CROSS / Plan: [facility] PPO / Product Type: PPO / In time: 10:30  Out time: 11:00 Total Treatment Time (min): 30 Visit #: 40 AH 82 Medicare/BCBS Only Total Timed Codes (min):  30 1:1 Treatment Time:  30 Treatment Area: Cerebral infarction, unspecified [I63.9] SUBJECTIVE Pain Level (0-10 scale): 0/10 Any medication changes, allergies to medications, adverse drug reactions, diagnosis change, or new procedure performed?: [x] No    [] Yes (see summary sheet for update) Subjective functional status/changes:   [] No changes reported Pt. Reports she is having an easier time getting into bed at home but still requires assistance. OBJECTIVE 30 min Neuromuscular Re-education:  [x]  See flow sheet :  
Rationale: increase strength, improve coordination and improve balance  to improve the patients ability to increase ease of ADLs With 
 [x] TE 
 [] TA 
 [] neuro 
 [] other: Patient Education: [x] Review HEP [] Progressed/Changed HEP based on:  
[] positioning   [] body mechanics   [] transfers   [] heat/ice application   
[] other:   
 
Other Objective/Functional Measures:  
Pt. Required smooth surface to bring left LE fwd in standing She continues to have significant decrease in left LE strength which prevents ambulation She had improved grasping during cone reaches Pain Level (0-10 scale) post treatment: 0/10 ASSESSMENT/Changes in Function:  Pt. Is progressing slowly towards goals. She continues to have decreased left LE strength and decreased balance which is affecting ambulation.   
 
Patient will continue to benefit from skilled PT services to modify and progress therapeutic interventions, address functional mobility deficits, address ROM deficits, address strength deficits, analyze and address soft tissue restrictions, analyze and cue movement patterns, analyze and modify body mechanics/ergonomics and assess and modify postural abnormalities to attain remaining goals. Progress towards goals / Updated goals: 1. Patient will improve FOTO score by 13 points in order to demonstrate a significant improvement in function. Status at last note/certification: 34 points at evaluation, will reassess at next visit Current: 2. Patient will perform sit to supine and supine to sit transfers with SBA in order to increase ease of transfers at home. Status at last note/certification: min A for left UE management Current: 3. Patient will ambulate 20 feet with hemiwalker and CGA in order to increase ease of ambulation at home.  
Status at last note/certification: Min A to manage left LE inside parallel bars Current: PLAN 
[]  Upgrade activities as tolerated     [x]  Continue plan of care 
[]  Update interventions per flow sheet      
[]  Discharge due to:_ 
[]  Other:_ Elena Asher PT 10/26/2018  1:24 PM 
 
Future Appointments Date Time Provider Wolf Peters 10/29/2018 10:30 AM Kelsey Saenz PTA [id number] [facility]  
10/29/2018 11:00 AM Asia Asif [id number] [facility]  
10/31/2018 10:30 AM Daniele Galdamez PT [id number] [facility]  
10/31/2018 11:00 AM Asia Asif [id number] [facility]  
11/2/2018 10:30 AM Daniele Galdamez PT [id number] [facility]  
11/2/2018 11:15 AM Asia Asif MMCPTPB [facility]

## 2018-10-29 ENCOUNTER — HOSPITAL ENCOUNTER (OUTPATIENT)
Dept: PHYSICAL THERAPY | Age: 68
Discharge: HOME | End: 2018-10-29
Payer: COMMERCIAL

## 2018-10-29 PROCEDURE — 97110 THERAPEUTIC EXERCISES: CPT

## 2018-10-29 PROCEDURE — 97112 NEUROMUSCULAR REEDUCATION: CPT

## 2018-10-29 PROCEDURE — 97116 GAIT TRAINING THERAPY: CPT

## 2018-10-29 PROCEDURE — 97530 THERAPEUTIC ACTIVITIES: CPT

## 2018-10-29 NOTE — PROGRESS NOTES
PT DAILY TREATMENT NOTE 10-18 Patient Name: Rell Levin Date:10/29/2018 : 1950 [x]  Patient  Verified Payor: BLUE CROSS / Plan: [facility] PPO / Product Type: PPO / In time:10:28  Out time:11:00 Total Treatment Time (min): 32 Visit #: 12 of 24 Medicare/BCBS Only Total Timed Codes (min):  32 1:1 Treatment Time:  32 Treatment Area: Cerebral infarction, unspecified [I63.9] SUBJECTIVE Pain Level (0-10 scale): 0/10 Any medication changes, allergies to medications, adverse drug reactions, diagnosis change, or new procedure performed?: [x] No    [] Yes (see summary sheet for update) Subjective functional status/changes:   [] No changes reported Pt reports no current pain. She states she does try some exercises at home. She has a hard time moving the left leg. She doesn't do any walking and sits a lot but wants to be able to walk. OBJECTIVE 15 min Neuromuscular Re-education:  [x]  See flow sheet :  
Rationale: increase ROM, increase strength, improve coordination, improve balance and increase proprioception  to improve the patients ability to safely perform transfers and ambulate 17 minutes of gait training to walk 50 feet with hemiwalker and w/c follow using Min A for left LE advancement with step to pattern With 
 [] TE 
 [] TA 
 [] neuro 
 [] other: Patient Education: [x] Review HEP [] Progressed/Changed HEP based on:  
[] positioning   [] body mechanics   [] transfers   [] heat/ice application   
[] other:   
 
Other Objective/Functional Measures: CGA for w/c to table transfer to the left Slight pain in left hip with attempt at increased left weightbearing for sit to stands Challenged with right side lying hip flexion and knee flexion more psoas/core activation than rectus femoris No hamstring activation palpated Good standing balance; utilized left knee extension for right step using hemiwalker Pain Level (0-10 scale) post treatment: 0/10 ASSESSMENT/Changes in Function: Pt making slow progress towards initial goals. She continues to have decrease in left LE activation requiring Min A for left advancement to work on gait. She utilizes left knee hyperextension for stability at midstance to advance right LE. Will continue working to activate left LE muscles in order to assist with transfers and ambulation. Patient will continue to benefit from skilled PT services to modify and progress therapeutic interventions, address functional mobility deficits, address ROM deficits, address strength deficits, analyze and address soft tissue restrictions, analyze and cue movement patterns, analyze and modify body mechanics/ergonomics, assess and modify postural abnormalities, address imbalance/dizziness and instruct in home and community integration to attain remaining goals. Progress towards goals / Updated goals: 1. Patient will improve FOTO score by 13 points in order to demonstrate a significant improvement in function.  
Status at last note/certification: 34 points at evaluation, will reassess at next visit Current: 2. Patient will perform sit to supine and supine to sit transfers with SBA in order to increase ease of transfers at home.  
Status at last note/certification: min A for left UE management  
Current: 3. Patient will ambulate 20 feet with hemiwalker and CGA in order to increase ease of ambulation at home.  
Status at last note/certification: Min A to manage left LE inside parallel bars Current: PLAN [x]  Upgrade activities as tolerated     [x]  Continue plan of care 
[]  Update interventions per flow sheet      
[]  Discharge due to:_ 
[]  Other:_ Faith Landers PTA 10/29/2018  8:26 AM 
 
Future Appointments Date Time Provider Wolf Peters 10/29/2018 10:30 AM Debra Montiel PTA [id number] [facility]  
10/29/2018 11:00 AM Elsa Blakely [id number] [facility]  
 10/31/2018 10:30 AM Isela Parra PT [id number] [facility]  
10/31/2018 11:00 AM Rashawn Real [id number] [facility]  
11/2/2018 10:30 AM Isela Parra PT [id number] [facility]  
11/2/2018 11:15 AM Rashawn Real [id number] [facility]

## 2018-10-29 NOTE — PROGRESS NOTES
OT DAILY TREATMENT NOTE 10-18 Patient Name: Dhiraj Willingham Date:10/29/2018 : 1950 [x]  Patient  Verified Payor: BLUE CROSS / Plan: [facility] PPO / Product Type: PPO / In time:1101  Out time:1148 Total Treatment Time (min): 47 Visit #: 12 of 24 Medicare/BCBS Only Total Timed Codes (min):  47 1:1 Treatment Time:  47  
 
Treatment Area: Muscle weakness of left upper extremity [M62.81] SUBJECTIVE Pain Level (0-10 scale): 0/10 Any medication changes, allergies to medications, adverse drug reactions, diagnosis change, or new procedure performed?: [x] No    [] Yes (see summary sheet for update) Subjective functional status/changes:   [] No changes reported My doctor told me to take my pain pill before therapy. I need to do that next time. OBJECTIVE 32 min Therapeutic Exercise:  [] See flow sheet :  
Rationale: increase ROM and increase strength to improve the patients ability to , lift Graded Clothes Pin: 4# graded clothes pin with puff balls Yellow Therabar: 3 ways: 10x Saebo Tree: Yellow, Red, [place] 15 min Therapeutic Activity:  []  See flow sheet :  
Rationale: increase ROM and improve coordination  to improve the patients ability to grab/place items accurately 9Hole 1 in colored cubes: Pt used Left hand to grab color coordinated blocks for placement. Pt encouraged to perform forward reach when placing/removing appropriate items. With 
 [] TE 
 [] TA 
 [] neuro 
 [] other: Patient Education: [x] Review HEP [] Progressed/Changed HEP based on:  
[] positioning   [] body mechanics   [] transfers   [] heat/ice application  
[] Splint wear/care   [] Sensory re-education   [] scar management     
[] other:   
 
     
Other Objective/Functional Measures:  
 
9 [facility] 
 10/29/18 DATE Change Right 21 Left 38 Pain Level (0-10 scale) post treatment: 0/10 ASSESSMENT/Changes in Function:  Fatigue increased as session progressed Patient will continue to benefit from skilled OT services to modify and progress therapeutic interventions, address ROM deficits, address strength deficits, analyze and cue movement patterns and instruct in home and community integration to attain remaining goals. []  See Plan of Care 
[]  See progress note/recertification 
[]  See Discharge Summary Progress towards goals / Updated goals: 
Updated Goals: to be achieved in 4 weeks: 1.  Patient will report improved left hand use as shown by reduced limitation on Neuro Quality of Life by at least 20% ( initially 66%). 2.  Patient will be able to perform accurate reach and grasp of 1 inch and smaller items with left hand to be able to grasp items in home without knocking over and increase safety. Progressing with in clinic activities 10/29/18 3.  Patient will be able to reach with left hand to place items in dresser, in fridge on counter etc. Progressing with in clinic activities 10/29/18 4.  Patient will improve  and pinches in left hand by 10# and 2-3# to help with opening packages and containers.   Progressing with graded clothes pins, therabar 10/29/18 5.  Patient will be independent in home program to improve left shoulder ROM and true forward flexion. PLAN 
[]  Upgrade activities as tolerated     [x]  Continue plan of care 
[]  Update interventions per flow sheet      
[]  Discharge due to:_ 
[]  Other:_ MAICOL Gu[place]ama 10/29/2018  8:56 AM 
 
Future Appointments Date Time Provider Wolf Peters 10/29/2018 10:30 AM Kelsey Sanez PTA MMCPTPB [facility]  
10/29/2018 11:00 AM Asia Asif [id number] [facility]  
10/31/2018 10:30 AM Daniele Galdamez, PT MMCPTPB [facility]  
10/31/2018 11:00 AM Asia Asif [id number] [facility]  
11/2/2018 10:30 AM Daniele Galdamez, PT [id number] [facility]  
11/2/2018 11:15 AM Asia Asif [id number] [facility]

## 2018-10-31 ENCOUNTER — HOSPITAL ENCOUNTER (OUTPATIENT)
Dept: PHYSICAL THERAPY | Age: 68
Discharge: HOME | End: 2018-10-31
Payer: COMMERCIAL

## 2018-10-31 PROCEDURE — 97530 THERAPEUTIC ACTIVITIES: CPT

## 2018-10-31 PROCEDURE — 97110 THERAPEUTIC EXERCISES: CPT

## 2018-10-31 PROCEDURE — 97116 GAIT TRAINING THERAPY: CPT

## 2018-10-31 PROCEDURE — 97112 NEUROMUSCULAR REEDUCATION: CPT

## 2018-10-31 NOTE — PROGRESS NOTES
OT DAILY TREATMENT NOTE 10-18 Patient Name: Mark Bryant Date:10/31/2018 : 1950 [x]  Patient  Verified Payor: BLUE CROSS / Plan: [facility] PPO / Product Type: PPO / In time:1100  Out time:1145 Total Treatment Time (min): 45 Visit #: 74 RJ 24 Medicare/BCBS Only Total Timed Codes (min):  35 1:1 Treatment Time:  45  
 
Treatment Area: Muscle weakness of left upper extremity [M62.81] SUBJECTIVE Pain Level (0-10 scale): 0/10 Any medication changes, allergies to medications, adverse drug reactions, diagnosis change, or new procedure performed?: [x] No    [] Yes (see summary sheet for update) Subjective functional status/changes:   [] No changes reported My shoulder is tight OBJECTIVE Modality rationale: decrease inflammation, decrease pain and increase tissue extensibility to improve the patients ability to use Left UE Min Type Additional Details  
 [] Estim:  []Unatt       []IFC  []Premod []Other:  []w/ice   []w/heat Position: Location:  
 [] Estim: []Att    []TENS instruct  []NMES []Other:  []w/US   []w/ice   []w/heat Position: Location:  
 []  Traction: [] Cervical       []Lumbar 
                     [] Prone          []Supine []Intermittent   []Continuous Lbs: 
[] before manual 
[] after manual  
 []  Ultrasound: []Continuous   [] Pulsed []1MHz   []3MHz Location: 
W/cm2:  
 []  Iontophoresis with dexamethasone Location: [] Take home patch  
[] In clinic  
10 []  Ice     [x]  heat 
[]  Ice massage 
[]  Laser  
[]  Anodyne Position: Location: Left Shoulder  
 []  Laser with stim 
[]  Other: Position: Location:  
 []  Vasopneumatic Device Pressure:       [] lo [] med [] hi  
Temperature: [] lo [] med [] hi  
[x] Skin assessment post-treatment:  [x]intact []redness- no adverse reaction 
  []redness  adverse reaction: 25 min Therapeutic Exercise:  [] See flow sheet :  
Rationale: increase ROM and increase strength to improve the patients ability to , pinch 4# Graded clothes pin with puff ball using Lateral Pinch Saebo Tree: 3 levels Swiss Ball: B Hands- Shoulder flexion with Min A for Left elbow extension, B UE with beach ball to emphasize equal pressure 10 min Therapeutic Activity:  []  See flow sheet :  
Rationale: increase ROM and improve coordination  to improve the patients ability to manipulate small items 1 in peg: pt used Left UE to place 20 1 in pegs into resistive peg board. Pt then used Left UE to place pegs on top of each other 10 x2 With 
 [] TE 
 [] TA 
 [] neuro 
 [] other: Patient Education: [x] Review HEP [] Progressed/Changed HEP based on:  
[] positioning   [] body mechanics   [] transfers   [] heat/ice application  
[] Splint wear/care   [] Sensory re-education   [] scar management     
[] other:   
 
     
Other Objective/Functional Measures:  
 
Saebo: Min A for 3rd level, rest breaks required due to muscle fatigue Pain Level (0-10 scale) post treatment: 0/10 ASSESSMENT/Changes in Function: FM skills improving, cueing/assist required for elbow extension Patient will continue to benefit from skilled OT services to modify and progress therapeutic interventions, address ROM deficits, address strength deficits, analyze and cue movement patterns and instruct in home and community integration to attain remaining goals. []  See Plan of Care 
[]  See progress note/recertification 
[]  See Discharge Summary Progress towards goals / Updated goals: 1.  Patient will report improved left hand use as shown by reduced limitation on Neuro Quality of Life by at least 20% ( initially 66%).  
2.  Patient will be able to perform accurate reach and grasp of 1 inch and smaller items with left hand to be able to grasp items in home without knocking over and increase safety. Progressing with in clinic activities 10/31/18 3.  Patient will be able to reach with left hand to place items in dresser, in fridge on counter etc. Progressing with in clinic activities 10/31/18 4.  Patient will improve  and pinches in left hand by 10# and 2-3# to help with opening packages and containers.   Progressing with graded clothes pins, therabar 10/31/18 5.  Patient will be independent in home program to improve left shoulder ROM and true forward flexion. PLAN 
[]  Upgrade activities as tolerated     [x]  Continue plan of care 
[]  Update interventions per flow sheet      
[]  Discharge due to:_ 
[]  Other:_ MAICOL Bragg 10/31/2018  9:17 AM 
 
Future Appointments Date Time Provider Wolf Peters 10/31/2018 10:30 AM Curtis Tripp PT [id number] [facility]  
10/31/2018 11:00 AM Pablo Conrad [id number] [facility]  
11/2/2018 10:30 AM Curtis Tripp PT [id number] [facility]  
11/2/2018 11:15 AM Pablo Conrad [id number] [facility]

## 2018-10-31 NOTE — PROGRESS NOTES
PT DAILY TREATMENT NOTE 10-18 Patient Name: Sarah Stephens Date:10/31/2018 : 1950 [x]  Patient  Verified Payor: BLUE CROSS / Plan: [facility] PPO / Product Type: PPO / In time: 10:28  Out time: 11:00 Total Treatment Time (min): 32 Visit #: 51 YB 96 Medicare/BCBS Only Total Timed Codes (min):  32 1:1 Treatment Time:  32 Treatment Area: Cerebral infarction, unspecified [I63.9] SUBJECTIVE Pain Level (0-10 scale): 0/10 Any medication changes, allergies to medications, adverse drug reactions, diagnosis change, or new procedure performed?: [x] No    [] Yes (see summary sheet for update) Subjective functional status/changes:   [] No changes reported Pt. Reports she is doing pretty good today. She reports she has been working on standing at home. OBJECTIVE 17 min Neuromuscular Re-education:  x-  See flow sheet : standing balance activities, facilitated hip abd/flex in standing, weight shifting, facilitated sit to stand transfers. Rationale: increase strength, improve coordination and improve balance  to improve the patients ability to increase ease of ambulation 15 min Gait Training:  _31__ feet x2 with _hemiwalker__ device on level surfaces with _min A__ level of assist  
Rationale: to increase ease of ambulation With 
 [x] TE 
 [] TA 
 [] neuro 
 [] other: Patient Education: [x] Review HEP [] Progressed/Changed HEP based on:  
[] positioning   [] body mechanics   [] transfers   [] heat/ice application   
[] other:   
 
Other Objective/Functional Measures:  
Pt. Continues to require min A to bring left foot fwd during ambulation She was able to bring left foot fwd and out to side in parallel bars with pillow case around foot She was challenged with EC balance but was able to perform without LOB Pt. Has excessive weight shift to right during sit to stand transfers Pain Level (0-10 scale) post treatment: 0/10 ASSESSMENT/Changes in Function: pt. Is progressing slowly towards goals. She continues to require assistance to bring left foot fwd during ambulation and has decreased left LE use during transfers. She continues to have significant left LE weakness. Patient will continue to benefit from skilled PT services to modify and progress therapeutic interventions, address functional mobility deficits, address ROM deficits, address strength deficits, analyze and address soft tissue restrictions, analyze and cue movement patterns, analyze and modify body mechanics/ergonomics and assess and modify postural abnormalities to attain remaining goals. Progress towards goals / Updated goals: 1. Patient will improve FOTO score by 13 points in order to demonstrate a significant improvement in function.  
Status at last note/certification: 34 points at evaluation, will reassess at next visit Current: 2. Patient will perform sit to supine and supine to sit transfers with SBA in order to increase ease of transfers at home.  
Status at last note/certification: min A for left UE management  
Current: 3. Patient will ambulate 20 feet with hemiwalker and CGA in order to increase ease of ambulation at home.  
Status at last note/certification: Min A to manage left LE inside parallel bars Current: progressin feet with hemiwalker and min A (10/31/18) PLAN 
[]  Upgrade activities as tolerated     [x]  Continue plan of care 
[]  Update interventions per flow sheet      
[]  Discharge due to:_ 
[]  Other:_ Larry Mauricio PT 10/31/2018  10:18 AM 
 
Future Appointments Date Time Provider Wolf Peters 10/31/2018 10:30 AM Perla Loomis PT MMCPTPB [facility]  
10/31/2018 11:00 AM Jia Copping [id number] [facility]  
2018 10:30 AM Perla Loomis, PT [id number] [facility]  
2018 11:15 AM Jia Copping MMCPTPB [facility]

## 2018-11-02 ENCOUNTER — HOSPITAL ENCOUNTER (OUTPATIENT)
Dept: PHYSICAL THERAPY | Age: 68
Discharge: HOME | End: 2018-11-02
Payer: MEDICARE

## 2018-11-02 PROCEDURE — 97116 GAIT TRAINING THERAPY: CPT

## 2018-11-02 PROCEDURE — 97112 NEUROMUSCULAR REEDUCATION: CPT

## 2018-11-02 NOTE — PROGRESS NOTES
OT DAILY TREATMENT NOTE 10-18 Patient Name: Yadi Salazar Date:2018 : 1950 [x]  Patient  Verified Payor: BLUE CROSS / Plan: [facility] PPO / Product Type: PPO / In time:1115  Out time:1205 Total Treatment Time (min): 50 Visit #: 14 of 24 Medicare/BCBS Only Total Timed Codes (min):  40 1:1 Treatment Time:  50 Treatment Area: Muscle weakness of left upper extremity [M62.81] SUBJECTIVE Pain Level (0-10 scale): 0/10 Any medication changes, allergies to medications, adverse drug reactions, diagnosis change, or new procedure performed?: [x] No    [] Yes (see summary sheet for update) Subjective functional status/changes:   [] No changes reported Pt reports tightness in neck and Left Shoulder OBJECTIVE Modality rationale: decrease pain and increase tissue extensibility to improve the patients ability to reach Type Additional Details  
[] Estim:  []Unatt       []IFC  []Premod []Other:  []w/ice   []w/heat Position: Location:  
[] Estim: []Att    []TENS instruct  []NMES []Other:  []w/US   []w/ice   []w/heat Position: Location:  
[]  Traction: [] Cervical       []Lumbar 
                     [] Prone          []Supine []Intermittent   []Continuous Lbs: 
[] before manual 
[] after manual  
[]  Ultrasound: []Continuous   [] Pulsed []1MHz   []3MHz W/cm2: 
Location:  
[]  Iontophoresis with dexamethasone Location: [] Take home patch  
[] In clinic  
[]  Ice     [x]  heat -10 min 
[]  Ice massage 
[]  Laser  
[]  Anodyne Position:supine with Head elevated Location: Left Shoulder  
[]  Laser with stim 
[]  Other:  Position: Location:  
[]  Vasopneumatic Device Pressure:       [] lo [] med [] hi  
Temperature: [] lo [] med [] hi  
[x] Skin assessment post-treatment:  [x]intact []redness- no adverse reaction 
  []redness  adverse reaction: 40 min Neuromuscular Re-education:  []  See flow sheet :  
Rationale: increase ROM, increase strength and improve coordination  to improve the patients ability to reach Supine: 
Left Shoulder Flexion Left Shoulder Abduction Left Shoulder Horizontal abduction/adduction Left Shoulder Flexion with AAROM elbow flexion/extension Seated Edge of Mat: 
Weightbearing into Left Hand/Wrist 
Weight bearing into Left forearm with wedge B Shrugs/Retraction with Mirror Neck Exercises Scapular Mobilization to Left Shoulder With 
 [] TE 
 [] TA 
 [] neuro 
 [] other: Patient Education: [x] Review HEP [] Progressed/Changed HEP based on:  
[] positioning   [] body mechanics   [] transfers   [] heat/ice application  
[] Splint wear/care   [] Sensory re-education   [] scar management     
[] other:   
 
     
Other Objective/Functional Measures:  
 
Wheelchair <> Edge of Mat: Set Up/Supervision for positioning Sit to Supine: Independent with use of Right LE to move Left LE Mat Mobility: Independent with scooting Supine to Sit: SBA for physical assistance with Left LE Dynamic Sitting Balance: Good Tone noted with elbow extension Pain Level (0-10 scale) post treatment: 0/10 ASSESSMENT/Changes in Function: Tone inhibiting full Elbow Extension ROM, tightness in Left Scapula Patient will continue to benefit from skilled OT services to modify and progress therapeutic interventions, address ROM deficits, address strength deficits, analyze and address soft tissue restrictions, analyze and cue movement patterns and instruct in home and community integration to attain remaining goals. []  See Plan of Care 
[]  See progress note/recertification 
[]  See Discharge Summary Progress towards goals / Updated goals: 1.  Patient will report improved left hand use as shown by reduced limitation on Neuro Quality of Life by at least 20% ( initially 66%). 2.  Patient will be able to perform accurate reach and grasp of 1 inch and smaller items with left hand to be able to grasp items in home without knocking over and increase safety. Progressing with in clinic activities 10/31/18 3.  Patient will be able to reach with left hand to place items in dresser, in fridge on counter etc. Progressing with in clinic activities 11/2/18 4.  Patient will improve  and pinches in left hand by 10# and 2-3# to help with opening packages and containers.   Progressing with graded clothes pinschauabar 10/31/18 5.  Patient will be independent in home program to improve left shoulder ROM and true forward flexion. PLAN 
[]  Upgrade activities as tolerated     [x]  Continue plan of care 
[]  Update interventions per flow sheet      
[]  Discharge due to:_ 
[]  Other:_ MAICOL Grant 11/2/2018  10:08 AM 
 
Future Appointments Date Time Provider Wolf Peters 11/5/2018  9:45 AM Peter CAMPUZANO [facility]  
11/5/2018 10:30 AM Milon Corby, PT MMCPTPB [facility]  
11/7/2018 10:30 AM Milon Corby, PT MMCPTPB [facility]  
11/7/2018 11:45 AM Miguelito De Leon, OTR/L MMCPTPB [facility]  
11/8/2018 11:30 AM Milon Corby, PT MMCPTPB [facility]  
11/8/2018 12:15 PM Peter VALDESW [facility]  
11/13/2018 10:00 AM Natalie Moser, PTA MMCPTPB [facility]  
11/13/2018 10:30 AM Peter Posey VPOFCVP [facility]  
11/14/2018 10:00 AM Milon Corby, PT MMCPTPB [facility]  
11/14/2018 10:45 AM Miguelito De Leon, OTR/L MMCPTPB [facility]  
11/16/2018 10:30 AM Milon Corby, PT MMCPTPB [facility]  
11/16/2018 11:15 AM Peter Posey PPCRWDO [facility]  
11/19/2018 10:30 AM [facility] PT [address] MMCPTPB [facility]  
11/19/2018 11:15 AM Peter Posey DAXJZJB [facility]  
11/20/2018 10:30 AM Peter Posey IKSHPNT [facility]  
11/20/2018 11:30 AM Milon Corby, PT MMCPTPB [facility]  
11/26/2018 10:00 AM Natalie Moser, PTA MMCPTPB [facility]  
11/26/2018 10:30 AM Peter Posey XCSAETI [facility]  
 11/29/2018 10:00 AM Arielsharmilatej Elizabethrobyn UHVVWPJ [facility]  
11/29/2018 11:00 AM Jose Ventura, PT MMCPTPB [facility]  
11/30/2018 10:00 AM Balwinder Beard OTR/L MMCPTPB [facility]  
11/30/2018 11:00 AM Jose Ventura PT MMCPTPB [facility]

## 2018-11-02 NOTE — PROGRESS NOTES
PT DAILY TREATMENT NOTE 10-18 Patient Name: Maddie Edwards Date:2018 : 1950 [x]  Patient  Verified Payor: BLUE CROSS / Plan: [facility] PPO / Product Type: PPO / In time: 10:21  Out time: 11:00 Total Treatment Time (min): 39 Visit #: 14 of 24 Medicare/BCBS Only Total Timed Codes (min):  39 1:1 Treatment Time:  44 Treatment Area: Cerebral infarction, unspecified [I63.9] SUBJECTIVE Pain Level (0-10 scale): 0/10 Any medication changes, allergies to medications, adverse drug reactions, diagnosis change, or new procedure performed?: [x] No    [] Yes (see summary sheet for update) Subjective functional status/changes:   [] No changes reported Pt. Reports she is doing pretty well today. OBJECTIVE 24 min Neuromuscular Re-education:  [x]  See flow sheet : facilitated sit to stands, standing balance/reaching activities, facilitated weight shift during step taps, facilitated hip flexion/abd in standing Rationale: increase strength, improve coordination and improve balance  to improve the patients ability to increase ease of ambulation and transfers 15 min Gait Training:  _58 and 28__ feet with _hemiwalker__ device on level surfaces with _min__ level of assist  
Rationale: to increase ease of ambulation With 
 [x] TE 
 [] TA 
 [] neuro 
 [] other: Patient Education: [x] Review HEP [] Progressed/Changed HEP based on:  
[] positioning   [] body mechanics   [] transfers   [] heat/ice application   
[] other:   
 
Other Objective/Functional Measures:  
Pt. Requires min A for left LE management during ambulation. She requires cues for scanning for objects in her way during ambulation She continues to demonstrate poor left LE strength Pain Level (0-10 scale) post treatment: 0/10 ASSESSMENT/Changes in Function:  Pt. Is making limited progress towards goals.  She continues to have significant decrease in left LE strength which is affecting her ambulation and transfers. She continues to require assistance for transfers. Patient will continue to benefit from skilled PT services to modify and progress therapeutic interventions, address functional mobility deficits, address ROM deficits, address strength deficits, analyze and address soft tissue restrictions, analyze and cue movement patterns, analyze and modify body mechanics/ergonomics and assess and modify postural abnormalities to attain remaining goals. Progress towards goals / Updated goals: 1. Patient will improve FOTO score by 13 points in order to demonstrate a significant improvement in function.  
Status at last note/certification: 34 points at evaluation, will reassess at next visit Current: 2. Patient will perform sit to supine and supine to sit transfers with SBA in order to increase ease of transfers at home.  
Status at last note/certification: min A for left UE management  
Current: 3. Patient will ambulate 20 feet with hemiwalker and CGA in order to increase ease of ambulation at home.  
Status at last note/certification: Min A to manage left LE inside parallel bars Current: progressin feet with hemiwalker and min A (10/31/18) PLAN 
[]  Upgrade activities as tolerated     [x]  Continue plan of care 
[]  Update interventions per flow sheet      
[]  Discharge due to:_ 
[]  Other:_ Andrew Cooley, PT 2018  12:54 PM 
 
Future Appointments Date Time Provider Wolf Peters 2018  9:45 AM Shell Rosenthal XLEABCU [facility]  
2018 10:30 AM Toño Horton PT MMCPTPB [facility]  
2018 10:30 AM Toño Horton PT MMCPTMARIO [facility]  
2018 11:45 AM Ahmet Hernandez OTR/LORENA MMCPTPB [facility]  
2018 11:30 AM COLIN Britt [facility]  
2018 12:15 PM Shell Rosenthal [id number] [facility]  
2018 10:00 AM Stan Argueta PTA MMCPTPB [facility]  
2018 10:30 AM Shell Rosenthal [id number] [facility]  
 11/14/2018 10:00 AM Wade Mendez, PT MMCPTPB [facility]  
11/14/2018 10:45 AM Orlando Da Silva, OTR/L MMCPTPB [facility]  
11/16/2018 10:30 AM Wade Mendez, PT MMCPTPB [facility]  
11/16/2018 11:15 AM Dell Layer XERCPST [facility]  
11/19/2018 10:30 AM [facility] PT [address] MMCPTPB [facility]  
11/19/2018 11:15 AM Dell Layer [id number] [facility]  
11/20/2018 10:30 AM Dell Layer SNYNVCW [facility]  
11/20/2018 11:30 AM Wade Mendez, PT MMCPTPB [facility]  
11/26/2018 10:00 AM Maxi Mehta, PTA MMCPTPB [facility]  
11/26/2018 10:30 AM Dell Layer HJAQREP [facility]  
11/29/2018 10:00 AM Dell Layer [id number] [facility]  
11/29/2018 11:00 AM Wade Mendez, PT MMCPTPB [facility]  
11/30/2018 10:00 AM Orlando Da Silva, OTR/L MMCPTPB [facility]  
11/30/2018 11:00 AM Wade Mendez, PT MMCPTPB [facility]

## 2018-11-05 ENCOUNTER — HOSPITAL ENCOUNTER (OUTPATIENT)
Dept: PHYSICAL THERAPY | Age: 68
Discharge: HOME | End: 2018-11-05
Payer: MEDICARE

## 2018-11-05 PROCEDURE — 97116 GAIT TRAINING THERAPY: CPT

## 2018-11-05 PROCEDURE — 97112 NEUROMUSCULAR REEDUCATION: CPT

## 2018-11-05 NOTE — PROGRESS NOTES
OT DAILY TREATMENT NOTE 10-18 Patient Name: Donaldo Zimmer Date:2018 : 1950 [x]  Patient  Verified Payor: BLUE CROSS / Plan: [facility] PPO / Product Type: PPO / In time:946  Out time:1030 Total Treatment Time (min): 44 Visit #: 15 of 24 Medicare/BCBS Only Total Timed Codes (min):  34 1:1 Treatment Time:  44 Treatment Area: Muscle weakness of left upper extremity [M62.81] SUBJECTIVE Pain Level (0-10 scale): 0/10 Any medication changes, allergies to medications, adverse drug reactions, diagnosis change, or new procedure performed?: [x] No    [] Yes (see summary sheet for update) Subjective functional status/changes:   [] No changes reported Pt reports achyness in Left shoulder and Left knee with movement OBJECTIVE Modality rationale: decrease pain and increase tissue extensibility to improve the patients ability to reach Type Additional Details  
[] Estim:  []Unatt       []IFC  []Premod []Other:  []w/ice   []w/heat Position: Location:  
[] Estim: []Att    []TENS instruct  []NMES []Other:  []w/US   []w/ice   []w/heat Position: Location:  
[]  Traction: [] Cervical       []Lumbar 
                     [] Prone          []Supine []Intermittent   []Continuous Lbs: 
[] before manual 
[] after manual  
[]  Ultrasound: []Continuous   [] Pulsed []1MHz   []3MHz W/cm2: 
Location:  
[]  Iontophoresis with dexamethasone Location: [] Take home patch  
[] In clinic  
[]  Ice     [x]  heat -10 min 
[]  Ice massage 
[]  Laser  
[]  Anodyne Position: supine with wedge Location: Left Shoulder  
[]  Laser with stim 
[]  Other:  Position: Location:  
[]  Vasopneumatic Device Pressure:       [] lo [] med [] hi  
Temperature: [] lo [] med [] hi  
[x] Skin assessment post-treatment:  [x]intact []redness- no adverse reaction 
  []redness  adverse reaction: 34 min Neuromuscular Re-education:  []  See flow sheet :  
Rationale: increase ROM, increase strength and improve coordination  to improve the patients ability to reach AAROM: In Supine: Shoulder flexion, elbow flexion/extension, shoulder flexion with elbow extension/flexion, Shoulder Abduction Seated Edge of Mat: Shrugs/Retraction with mirror, scap mobilization, Weight bearing into Left Hand/Wrist 
 
 
With 
 [] TE 
 [] TA 
 [] neuro 
 [] other: Patient Education: [x] Review HEP [] Progressed/Changed HEP based on:  
[] positioning   [] body mechanics   [] transfers   [] heat/ice application  
[] Splint wear/care   [] Sensory re-education   [] scar management     
[] other:   
 
     
Other Objective/Functional Measures: W/C to Edge of Mat: Supervision/Set up with SBA for safety in stand Edge of Mat to Supine: SBA, Physical Assist for BLE due to pain in Left knee with movement Supine to Edge of Mat: Min a for physical assistance with BLE Edge of Mat to W/C: Mod I Pain Level (0-10 scale) post treatment: 0/10 ASSESSMENT/Changes in Function: continued stiffness/tone in Left Scapula Patient will continue to benefit from skilled OT services to modify and progress therapeutic interventions, address ROM deficits, address strength deficits, analyze and cue movement patterns and instruct in home and community integration to attain remaining goals. []  See Plan of Care 
[]  See progress note/recertification 
[]  See Discharge Summary Progress towards goals / Updated goals: 1.  Patient will report improved left hand use as shown by reduced limitation on Neuro Quality of Life by at least 20% ( initially 66%). 2.  Patient will be able to perform accurate reach and grasp of 1 inch and smaller items with left hand to be able to grasp items in home without knocking over and increase safety. Progressing with in clinic activities 10/31/18 3.  Patient will be able to reach with left hand to place items in dresser, in fridge on counter etc. Progressing with in clinic activities 11/2/18 4.  Patient will improve  and pinches in left hand by 10# and 2-3# to help with opening packages and containers.   Progressing with graded clothes pinschauabajarvis 10/31/18 5.  Patient will be independent in home program to improve left shoulder ROM and true forward flexion. 
  
 
 
 
PLAN 
[]  Upgrade activities as tolerated     [x]  Continue plan of care 
[]  Update interventions per flow sheet      
[]  Discharge due to:_ 
[]  Other:_ MAICOL Boone 11/5/2018,   8:35 AM 
 
Future Appointments Date Time Provider Wolf Peters 11/5/2018  9:45 AM Mary ALVAREZ [facility]  
11/5/2018 10:30 AM Geovanna Preciado PT MMCPTPB [facility]  
11/7/2018 10:30 AM Geovanna Preciado PT MMCPTPB [facility]  
11/7/2018 11:45 AM Oc Essex, OTR/L MMCPTPB [facility]  
11/8/2018 11:30 AM Geovanna Preciado PT [id number] [facility]  
11/8/2018 12:15 PM Mary Linn [id number] [facility]  
11/13/2018  9:30 AM Maria Luz Tejada PTA MMCPTPB [facility]  
11/13/2018 10:30 AM Charls Essex, OTR/L MMCPTPB [facility]  
11/14/2018 10:00 AM Geovanna Preciado PT MMCPTPB [facility]  
11/14/2018 10:45 AM Charls Essex, OTR/L MMCPTPB [facility]  
11/16/2018 10:30 AM Geovanna Preciado PT MMCPTPB [facility]  
11/16/2018 11:15 AM Mary Linn OKSEAUT [facility]  
11/19/2018 10:30 AM [facility] PT [address] MMCPTPB [facility]  
11/19/2018 11:15 AM Mary Linn WBFKNME [facility]  
11/20/2018 10:30 AM Mary Linn ISKQCEL [facility]  
11/20/2018 11:30 AM Geovanna Preciado, PT MMCPTPB [facility]  
11/26/2018 10:00 AM Jd Spaulding, PTA MMCPTPB [facility]  
11/26/2018 10:30 AM Mary Linn GUOTAPW [facility]  
11/29/2018 10:00 AM Mary Linn [id number] [facility]  
11/29/2018 11:00 AM Geovanna Preciado, PT MMCPTPB [facility]  
11/30/2018 10:00 AM Charls Essex, OTR/LORENA MMCPTPB [facility]  
11/30/2018 11:00 AM Geovanna Preciado, PT MMCPTPB [facility]

## 2018-11-05 NOTE — PROGRESS NOTES
PT DAILY TREATMENT NOTE 10-18 Patient Name: Mart Pate Date:2018 : 1950 [x]  Patient  Verified Payor: BLUE CROSS / Plan: [facility] PPO / Product Type: PPO / In time: 10:30  Out time: 11:00 Total Treatment Time (min): 30 Visit #: 15 of 24 Medicare/BCBS Only Total Timed Codes (min):  30 1:1 Treatment Time:  30 Treatment Area: Muscle weakness (generalized) [M62.81] Cerebral infarction, unspecified [I63.9] SUBJECTIVE Pain Level (0-10 scale): 0/10 Any medication changes, allergies to medications, adverse drug reactions, diagnosis change, or new procedure performed?: [x] No    [] Yes (see summary sheet for update) Subjective functional status/changes:   [] No changes reported Pt. Reports she is doing pretty good but she is having occasional right knee pain. OBJECTIVE 20 min Neuromuscular Re-education:  []  See flow sheet : transfer  Training, PNF, facilitated bridges. Rationale: increase strength, improve coordination and improve balance  to improve the patients ability to increase ease of ADLs 10 min Gait Training:  _15 and 10__ feet with _hemiwalker__ device on level surfaces with __min A_ level of assist  
Rationale: to increase ease of ambulation With 
 [x] TE 
 [] TA 
 [] neuro 
 [] other: Patient Education: [x] Review HEP [] Progressed/Changed HEP based on:  
[] positioning   [] body mechanics   [] transfers   [] heat/ice application   
[] other:   
 
Other Objective/Functional Measures:  
Pt. Tolerated PT well with no reports of increased pain She continues to require min A for ambulation but was able to take a few step unassisted today She requires min A for supine to sit and sit to supine transfers Pain Level (0-10 scale) post treatment: 0/10 ASSESSMENT/Changes in Function:  Pt. Is progressing slowly towards goals.  She continues to require assistance with ambulation and transfers secondary to significant decrease in left LE strength. Patient will continue to benefit from skilled PT services to modify and progress therapeutic interventions, address functional mobility deficits, address ROM deficits, address strength deficits, analyze and address soft tissue restrictions, analyze and cue movement patterns, analyze and modify body mechanics/ergonomics and assess and modify postural abnormalities to attain remaining goals. Progress towards goals / Updated goals: 1. Patient will improve FOTO score by 13 points in order to demonstrate a significant improvement in function.  
Status at last note/certification: 34 points at evaluation, will reassess at next visit Current: 2. Patient will perform sit to supine and supine to sit transfers with SBA in order to increase ease of transfers at home.  
Status at last note/certification: min A for left UE management  
Current: not met: min A (18) 3. Patient will ambulate 20 feet with hemiwalker and CGA in order to increase ease of ambulation at home.  
Status at last note/certification: Min A to manage left LE inside parallel bars Current: progressin feet with hemiwalker and min A (10/31/18) PLAN 
[]  Upgrade activities as tolerated     [x]  Continue plan of care 
[]  Update interventions per flow sheet      
[]  Discharge due to:_ 
[]  Other:_ Warden John PT 2018  11:06 AM 
 
Future Appointments Date Time Provider Wolf Peters 2018 10:30 AM Lay Benites, PT MMCPTPB [facility]  
2018 11:45 AM Jacy Thompson, OTR/L MMCPTPB [facility]  
2018 11:30 AM Lay Benites PT [id number] [facility]  
2018 12:15 PM Mamadou Brown [id number] [facility]  
2018  9:30 AM Naomy Infante PTA [facility]CPTP[facility]  
2018 10:30 AM Jacy Thompson, OTR/L [facility]CP[facility]  
2018 10:00 AM Lay Benites, PT [id number] [facility]  
2018 10:45 AM Jacy Thompson OTR/L [facility]CP[facility]  
 11/16/2018 10:30 AM Rod Swain, PT [facility]CPTP[facility]  
11/16/2018 11:15 AM Migdalia Crigler KNNIGWB [facility]  
11/19/2018 10:30 AM [facility] PT [address] [facility]  
11/19/2018 11:15 AM Migdalia Crigler EAUEDYU [facility]  
11/20/2018 10:30 AM Migdalia Crigler RGDIXRI [facility]  
11/20/2018 11:30 AM Rod Swain, PT MMCPTPB [facility]  
11/26/2018 10:00 AM Sharda Alexander, PTA [facility]  
11/26/2018 10:30 AM Migdalia Crigler FJXGRAR [facility]  
11/29/2018 10:00 AM Migdalia Crigler SIWRBUK [facility]  
11/29/2018 11:00 AM Rod Swain, PT MMCPTPB [facility]  
11/30/2018 10:00 AM Ricardo Chavarria, OTR/L [facility]CP[facility]  
11/30/2018 11:00 AM Rod Swain, PT [facility]CPTP[facility]

## 2018-11-07 ENCOUNTER — HOSPITAL ENCOUNTER (OUTPATIENT)
Dept: PHYSICAL THERAPY | Age: 68
Discharge: HOME | End: 2018-11-07
Payer: MEDICARE

## 2018-11-07 PROCEDURE — 97112 NEUROMUSCULAR REEDUCATION: CPT

## 2018-11-07 PROCEDURE — 97530 THERAPEUTIC ACTIVITIES: CPT

## 2018-11-07 PROCEDURE — 97032 APPL MODALITY 1+ESTIM EA 15: CPT

## 2018-11-07 PROCEDURE — 97110 THERAPEUTIC EXERCISES: CPT

## 2018-11-07 NOTE — PROGRESS NOTES
PT DAILY TREATMENT NOTE 10-18 Patient Name: Shanthi Calderón Date:2018 : 1950 [x]  Patient  Verified Payor: BLUE CROSS / Plan: [facility] PPO / Product Type: PPO / In time: 10:30  Out time: 11:00 Total Treatment Time (min): 30 Visit #: 74 EM 43 Medicare/BCBS Only Total Timed Codes (min):  30 1:1 Treatment Time:  30 Treatment Area: Muscle weakness (generalized) [M62.81] Cerebral infarction, unspecified [I63.9] SUBJECTIVE Pain Level (0-10 scale): 0/10 Any medication changes, allergies to medications, adverse drug reactions, diagnosis change, or new procedure performed?: [x] No    [] Yes (see summary sheet for update) Subjective functional status/changes:   [] No changes reported Pt. Reports she is feeling about the same today. OBJECTIVE 30 min Neuromuscular Re-education:  [x]  See flow sheet :  
Rationale: increase strength, improve coordination and improve balance  to improve the patients ability to increase ease of ambulation With 
 [x] TE 
 [] TA 
 [] neuro 
 [] other: Patient Education: [x] Review HEP [] Progressed/Changed HEP based on:  
[] positioning   [] body mechanics   [] transfers   [] heat/ice application   
[] other:   
 
Other Objective/Functional Measures:  
Pt. Continues to require min A for ambulation to bring left foot fwd She continues to have poor control with shifting weight to left side She ambulated 40 feet and 31 feet with left LE facilitation Pain Level (0-10 scale) post treatment:  0/10 ASSESSMENT/Changes in Function:  Pt. Is progressing slowly towards goals. She continues to have significant decrease in left LE strength and poor left LE control which is affecting her ambulation. She is unable to bring left LE forward without assistance.   
 
Patient will continue to benefit from skilled PT services to modify and progress therapeutic interventions, address functional mobility deficits, address ROM deficits, address strength deficits, analyze and address soft tissue restrictions, analyze and cue movement patterns, analyze and modify body mechanics/ergonomics and assess and modify postural abnormalities to attain remaining goals. Progress towards goals / Updated goals: 1. Patient will improve FOTO score by 13 points in order to demonstrate a significant improvement in function.  
Status at last note/certification: 34 points at evaluation, will reassess at next visit Current: 2. Patient will perform sit to supine and supine to sit transfers with SBA in order to increase ease of transfers at home.  
Status at last note/certification: min A for left UE management  
Current: not met: min A (18) 3. Patient will ambulate 20 feet with hemiwalker and CGA in order to increase ease of ambulation at home.  
Status at last note/certification: Min A to manage left LE inside parallel bars Current: progressin feet with hemiwalker and min A (10/31/18) PLAN 
[]  Upgrade activities as tolerated     [x]  Continue plan of care 
[]  Update interventions per flow sheet      
[]  Discharge due to:_ 
[]  Other:_ Elena Asher PT 2018  12:50 PM 
 
Future Appointments Date Time Provider Wolf Peters 2018 11:30 AM Daniele Galdamez, PT PORTILLOCCARY [facility]  
2018 12:15 PM Asia Asif [id number] [facility]  
2018  9:30 AM Pennydariana Brown, PTA [facility]CPTPB [facility]  
2018 10:30 AM Keily Hermosillo, OTR/L [facility]CPTPB [facility]  
2018 10:00 AM Daniele Galdamez PT MMCPTPB [facility]  
2018 10:45 AM Keily Hermosillo OTR/L [facility]CPTPB [facility]  
2018 10:30 AM Daniele Galdamez, PT [facility]CPTP[facility]  
2018 11:15 AM Asia Asif [id number] [facility]  
2018 10:30 AM [facility] PT [address] [facility]  
2018 11:15 AM Asia Asif RGSYNVO [facility]  
2018 10:30 AM Asia Asif ITPWPOB [facility]  
2018 11:30 AM Daniele Galdamez, PT MMCPTPB [facility]  
 11/26/2018 10:00 AM Yrn Jackson, PTA [facility]  
11/26/2018 10:30 AM Henrine Farida AWWUPER [facility]  
11/29/2018 10:00 AM Henrine Hence DEQIHTT [facility]  
11/29/2018 11:00 AM Alcira Chapa, PT MMCPTPB [facility]  
11/30/2018 10:00 AM Ana M Ocampo, OTR/L [facility]CP[facility]  
11/30/2018 11:00 AM Alcira Chapa, PT [facility]CPTPB [facility]

## 2018-11-07 NOTE — PROGRESS NOTES
OT DAILY TREATMENT NOTE 10-18 Patient Name: Dhiraj Willingham Date:2018 : 1950 [x]  Patient  Verified Payor: BLUE CROSS / Plan: [facility] PPO / Product Type: PPO / In time:1145  Out time:1230 Total Treatment Time (min): 45 Visit #: 24 PO 53 Medicare/BCBS Only Total Timed Codes (min):  45 1:1 Treatment Time:  45  
 
Treatment Area: Muscle weakness of left upper extremity [M62.81] SUBJECTIVE Pain Level (0-10 scale): 0/10 Any medication changes, allergies to medications, adverse drug reactions, diagnosis change, or new procedure performed?: [x] No    [] Yes (see summary sheet for update) Subjective functional status/changes:   [] No changes reported No report of pain, but requested rest breaks OBJECTIVE Modality rationale: increase muscle contraction/control to improve the patients ability to elevate scapula, retract Type Additional Details  
[] Estim:  []Unatt       []IFC  []Premod []Other:  []w/ice   []w/heat Position: Location:  
[x] Estim: []Att    []TENS instruct  [x]NMES  10 
                []Other:  []w/US   []w/ice   []w/heat Position: Location:  
[]  Traction: [] Cervical       []Lumbar 
                     [] Prone          []Supine []Intermittent   []Continuous Lbs: 
[] before manual 
[] after manual  
[]  Ultrasound: []Continuous   [] Pulsed []1MHz   []3MHz W/cm2: 
Location:  
[]  Iontophoresis with dexamethasone Location: [] Take home patch  
[] In clinic  
[]  Ice     []  heat 
[]  Ice massage 
[]  Laser  
[]  Anodyne Position: Location:  
[]  Laser with stim 
[]  Other:  Position: Location:  
[]  Vasopneumatic Device Pressure:       [] lo [] med [] hi  
Temperature: [] lo [] med [] hi  
[x] Skin assessment post-treatment:  [x]intact []redness- no adverse reaction 
  []redness  adverse reaction:  
 
10 min Therapeutic Exercise:  [] See flow sheet :  
 Rationale: increase strength to improve the patients ability to grasp 6 band hand helper x 9 with left hand 15 min Therapeutic Activity:  []  See flow sheet :  
Rationale: improve coordination  to improve the patients ability to manipulate items 1 in pegs in incline wedge all rows with left hand, then stacked in all rows with left hand x  
 10 
 
10 min Neuromuscular Re-education:  []  See flow sheet :  
Rationale: increase ROM and increase strength  to improve the patients ability to to elevate and retract scapula Scap elev and retraction with mobilization and tapping  Followed by NMES With 
 [x] TE 
 [] TA 
 [] neuro 
 [] other: Patient Education: [x] Review HEP [x] Progressed/Changed HEP based on: table slides 
[] positioning   [] body mechanics   [] transfers   [] heat/ice application  
[] Splint wear/care   [] Sensory re-education   [] scar management     
[] other:   
 
     
Other Objective/Functional Measures:  
Dropped 2/20 1 in pegs when moving pegs from board to box with left hand Pain Level (0-10 scale) post treatment: 0/10 ASSESSMENT/Changes in Function: Improving  hand placementl, ongoing tightness in scapua with limited response to NMES Patient will continue to benefit from skilled OT services to modify and progress therapeutic interventions, address ROM deficits, address strength deficits, analyze and address soft tissue restrictions and instruct in home and community integration to attain remaining goals. []  See Plan of Care 
[]  See progress note/recertification 
[]  See Discharge Summary Progress towards goals / Updated goals: *1.  Patient will report improved left hand use as shown by reduced limitation on Neuro Quality of Life by at least 20% ( initially 66%).  
2.  Patient will be able to perform accurate reach and grasp of 1 inch and smaller items with left hand to be able to grasp items in home without knocking over and increase safety. Progressing with in clinic activities 10/31/18, 80% accurate on 11/7/18 3.  Patient will be able to reach with left hand to place items in dresser, in fridge on counter etc. Progressing with in clinic activities 11/2/18 4.  Patient will improve  and pinches in left hand by 10# and 2-3# to help with opening packages and containers.   Progressing with graded clothes pins, therabar 10/31/181/7/18, progressing with hand helper 11/7/18 5.  Patient will be independent in home program to improve left shoulder ** PLAN [x]  Upgrade activities as tolerated     [x]  Continue plan of care 
[]  Update interventions per flow sheet      
[]  Discharge due to:_ 
[]  Other:_ Kaitlin Shahid OTR/LORENA 11/7/2018  1:39 PM 
 
Future Appointments Date Time Provider Wolf Peters 11/8/2018 11:30 AM Fareed Ortiz, PT AINXSER [facility]  
11/8/2018 12:15 PM Elsa Blakely [id number] [facility]  
11/13/2018  9:30 AM Ayush Corey, PTA MMCPTPB [facility]  
11/13/2018 10:30 AM Neal Thornton, OTR/L [facility]CPTPB [facility]  
11/14/2018 10:00 AM Fareed Ortiz, PT MMCPTPB [facility]  
11/14/2018 10:45 AM Neal Thornton, OTR/L [facility]CPTPB [facility]  
11/16/2018 10:30 AM Fareed Ortiz, PT MMCPTPB [facility]  
11/16/2018 11:15 AM Elsa Blakely MHVZMPT [facility]  
11/19/2018 10:30 AM [facility] PT [address] [facility]  
11/19/2018 11:15 AM Elsa Blakely HPTHOME [facility]  
11/20/2018 10:30 AM Elsa Blakely MZZDWWL [facility]  
11/20/2018 11:30 AM Fareed Ortiz, PT MMCPTPB [facility]  
11/26/2018 10:00 AM Debra Montiel, PTA [facility]CPTPB [facility]  
11/26/2018 10:30 AM Elsa Blakely UJGKOKE [facility]  
11/29/2018 10:00 AM Elsa Blakely [id number] [facility]  
11/29/2018 11:00 AM Fareed Ortiz, PT MMCPTPB [facility]  
11/30/2018 10:00 AM Neal Thornton OTR/L MMCPTPB [facility]  
11/30/2018 11:00 AM Fareed Ortiz, PT MMCPTPB [facility]

## 2018-11-13 ENCOUNTER — HOSPITAL ENCOUNTER (OUTPATIENT)
Dept: PHYSICAL THERAPY | Age: 68
Discharge: HOME | End: 2018-11-13
Payer: MEDICARE

## 2018-11-13 ENCOUNTER — APPOINTMENT (OUTPATIENT)
Dept: PHYSICAL THERAPY | Age: 68
End: 2018-11-13
Payer: MEDICARE

## 2018-11-13 PROCEDURE — 97530 THERAPEUTIC ACTIVITIES: CPT

## 2018-11-13 PROCEDURE — 97110 THERAPEUTIC EXERCISES: CPT

## 2018-11-13 NOTE — PROGRESS NOTES
OT DAILY TREATMENT NOTE 10-18 Patient Name: Carmina Iyer Date:2018 : 1950 [x]  Patient  Verified Payor: BLUE CROSS / Plan: [facility] PPO / Product Type: PPO / In time:1030  Out time:1115 Total Treatment Time (min): 45 Visit #: 27 AA 82 Medicare/BCBS Only Total Timed Codes (min):  45 1:1 Treatment Time:  45  
 
Treatment Area: Muscle weakness of left upper extremity [M62.81] SUBJECTIVE Pain Level (0-10 scale): 0/10 Any medication changes, allergies to medications, adverse drug reactions, diagnosis change, or new procedure performed?: [x] No    [] Yes (see summary sheet for update) Subjective functional status/changes:   [] No changes reported Pt family reports Pt prepared snap peas the previous day OBJECTIVE 18 min Therapeutic Exercise:  [] See flow sheet :  
Rationale: increase ROM and increase strength to improve the patients ability to  
 
6 band hand helper x18 
 
27 min Therapeutic Activity:  []  See flow sheet :  
Rationale: increase ROM and improve coordination  to improve the patients ability to grasp, reach 1 in pegs into color coordinating spot on inclined pegboard 16x [place]: Pt used left hand to  block of corresponding color to create tower 8 layers Pt used left hand to reach into box to pick out corresponding 1 in wooden blocks for hand helper activity With 
 [] TE 
 [] TA 
 [] neuro 
 [] other: Patient Education: [x] Review HEP [] Progressed/Changed HEP based on:  
[] positioning   [] body mechanics   [] transfers   [] heat/ice application  
[] Splint wear/care   [] Sensory re-education   [] scar management     
[] other:   
 
     
Other Objective/Functional Measures: Mod difficulty with stacking wooden blocks Pain Level (0-10 scale) post treatment: 0/10 ASSESSMENT/Changes in Function: Accuracy with grasping improving Patient will continue to benefit from skilled OT services to modify and progress therapeutic interventions, address ROM deficits, address strength deficits, analyze and cue movement patterns and instruct in home and community integration to attain remaining goals. []  See Plan of Care 
[]  See progress note/recertification 
[]  See Discharge Summary Progress towards goals / Updated goals: *1.  Patient will report improved left hand use as shown by reduced limitation on Neuro Quality of Life by at least 20% ( initially 66%). 2.  Patient will be able to perform accurate reach and grasp of 1 inch and smaller items with left hand to be able to grasp items in home without knocking over and increase safety. Progressing with in clinic activities 10/31/18, 80% accurate on 11/7/18 3.  Patient will be able to reach with left hand to place items in dresser, in fridge on counter etc. Progressing with in clinic activities 11/12/18 4.  Patient will improve  and pinches in left hand by 10# and 2-3# to help with opening packages and containers.   Progressing with graded clothes pins, therabar 10/31/181/7/18, progressing with hand helper 11/12/18 5.  Patient will be independent in home program to improve left shoulder PLAN 
[]  Upgrade activities as tolerated     [x]  Continue plan of care 
[]  Update interventions per flow sheet      
[]  Discharge due to:_ 
[]  Other:_ MAICOL Gonzales 11/13/2018  9:21 AM 
 
Future Appointments Date Time Provider Wolf Peters 11/13/2018  9:30 AM Michael Salazar PTA MMCPTPB [facility]  
11/13/2018 10:30 AM Tia Gilliam [id number] [facility]  
11/14/2018 10:00 AM Lindsay Qureshi, PT MMCPTPB [facility]  
11/14/2018 10:45 AM ELIAS Aldridge MMCPTPB [facility]  
11/16/2018 10:30 AM Lindsay Qureshi, PT [id number] [facility]  
11/16/2018 11:15 AM Tia Gilliam [id number] [facility]  
11/19/2018 10:30 AM [facility] PT [address] [facility]  
 11/19/2018 11:15 AM Pablo Ley QXPTOBP [facility]  
11/20/2018 10:30 AM Pablo Ley WCXXVNT [facility]  
11/20/2018 11:30 AM Maile Forrest, PT MMCPTPB [facility]  
11/26/2018 10:00 AM Rl Meier, PTA MMCPTPB [facility]  
11/26/2018 10:30 AM Pablo Ley ZCMBIAS [facility]  
11/29/2018 10:00 AM Pablo Ley YSCMLOX [facility]  
11/29/2018 11:00 AM Maile Forrest, PT MMCPTPB [facility]  
11/30/2018 10:00 AM Leopoldo Armor, OTR/L MMCPTPB [facility]  
11/30/2018 11:00 AM Maile Forrest, PT MMCPTPB [facility]

## 2018-11-14 ENCOUNTER — HOSPITAL ENCOUNTER (OUTPATIENT)
Dept: PHYSICAL THERAPY | Age: 68
Discharge: HOME | End: 2018-11-14
Payer: MEDICARE

## 2018-11-14 PROCEDURE — 97112 NEUROMUSCULAR REEDUCATION: CPT

## 2018-11-14 PROCEDURE — 97530 THERAPEUTIC ACTIVITIES: CPT

## 2018-11-14 PROCEDURE — 97116 GAIT TRAINING THERAPY: CPT

## 2018-11-14 PROCEDURE — 97110 THERAPEUTIC EXERCISES: CPT

## 2018-11-14 NOTE — PROGRESS NOTES
PT DAILY TREATMENT NOTE 10-18 Patient Name: Shannon Myles Date:2018 : 1950 [x]  Patient  Verified Payor: BLUE CROSS / Plan: [facility] PPO / Product Type: PPO / In time: 9:50  Out time: 10:30 Total Treatment Time (min): 40 Visit #: 73 TS 99 Medicare/BCBS Only Total Timed Codes (min):  40 1:1 Treatment Time:  40 Treatment Area: Muscle weakness (generalized) [M62.81] Cerebral infarction, unspecified [I63.9] SUBJECTIVE Pain Level (0-10 scale):  0/10 Any medication changes, allergies to medications, adverse drug reactions, diagnosis change, or new procedure performed?: [x] No    [] Yes (see summary sheet for update) Subjective functional status/changes:   [] No changes reported Pt. Reports she has been feeling pretty good. OBJECTIVE 25 min Neuromuscular Re-education:  [x]  See flow sheet :  
Rationale: increase strength, improve coordination and improve balance  to improve the patients ability to increase ease of ADLs 15 min Gait Training:  _30,28,21__ feet with _HW__ device on level surfaces with _min A__ level of assist for left LE Rationale: to increase ease of ambulation With 
 [x] TE 
 [] TA 
 [] neuro 
 [] other: Patient Education: [x] Review HEP [] Progressed/Changed HEP based on:  
[] positioning   [] body mechanics   [] transfers   [] heat/ice application   
[] other:   
 
Other Objective/Functional Measures:  
Pt. Was able to take 2 steps on left LE unassisted with [facility] In parallel bars pt. Was able to bring left LE fwd without pillow case today. She was educated on gastroc stretching for her HEP Pain Level (0-10 scale) post treatment: 0/10 ASSESSMENT/Changes in Function:  Pt. Is progressing slowly towards goals. Her ambulation continues to be limited secondary to left LE weakness. She continues to require assistance to bring left LE fwd during ambulation. Patient will continue to benefit from skilled PT services to modify and progress therapeutic interventions, address functional mobility deficits, address ROM deficits, address strength deficits, analyze and address soft tissue restrictions, analyze and cue movement patterns, analyze and modify body mechanics/ergonomics and assess and modify postural abnormalities to attain remaining goals. Progress towards goals / Updated goals: 1. Patient will improve FOTO score by 13 points in order to demonstrate a significant improvement in function.  
Status at last note/certification: 34 points at evaluation, will reassess at next visit Current: 2. Patient will perform sit to supine and supine to sit transfers with SBA in order to increase ease of transfers at home.  
Status at last note/certification: min A for left UE management  
Current: not met: min A (11/5/18) 3. Patient will ambulate 20 feet with hemiwalker and CGA in order to increase ease of ambulation at home.  
Status at last note/certification: Min A to manage left LE inside parallel bars Current: not met min A (11/14/18) PLAN 
[]  Upgrade activities as tolerated     [x]  Continue plan of care 
[]  Update interventions per flow sheet      
[]  Discharge due to:_ 
[]  Other:_ Verito Chilel PT 11/14/2018  8:54 AM 
 
Future Appointments Date Time Provider Wolf Peters 11/14/2018 10:00 AM Pablo May PT [facility] [address]  
11/14/2018 10:45 AM Adeola Giles OTR/L [facility] [address]  
11/16/2018 10:30 AM Pablo May PT [facility] [address]  
11/16/2018 11:15 AM Guy Loges WKTPHEL [address]  
11/19/2018 10:30 AM [address] [facility] [address]  
11/19/2018 11:15 AM Guy Loges [id number] [address]  
11/20/2018 10:30 AM Guy Loges [id number] [address]  
11/20/2018 11:30 AM Pablo May PT [facility] [address]  
11/26/2018 10:00 AM Shantel Barajas PTA [facility] [address]  
11/26/2018 10:30 AM Guy Loges [id number] [address]  
 11/29/2018 10:00 AM Leana RUIZ [facility]  
11/29/2018 11:00 AM Peter Dodd, PT [facility]  
11/30/2018 10:00 AM Deonna Poon OTR/L [facility]  
11/30/2018 11:00 AM Peter Dodd, PT [facility]

## 2018-11-14 NOTE — PROGRESS NOTES
OT DAILY TREATMENT NOTE 10-18 Patient Name: Sharda Ponce Date:2018 : 1950 [x]  Patient  Verified Payor: BLUE CROSS / Plan: [facility] PPO / Product Type: PPO / In time:1045  Out time:1130 Total Treatment Time (min): 45 Visit #: 95 IX 28 Medicare/BCBS Only Total Timed Codes (min):  45 1:1 Treatment Time:  45  
 
Treatment Area: Muscle weakness of left upper extremity [M62.81] SUBJECTIVE Pain Level (0-10 scale): 0/10 Any medication changes, allergies to medications, adverse drug reactions, diagnosis change, or new procedure performed?: [x] No    [] Yes (see summary sheet for update) Subjective functional status/changes:   [] No changes reported Just tired I haven't tried tto do my zipper, I have not tried putting on my socks or tying my shoes OBJECTIVE 15 min Therapeutic Exercise:  [] See flow sheet :  
Rationale: increase ROM and increase strength to improve the patients ability to reach Graded clothespins left on dowel tree in varied angles 8# x 10 Cane exercises to increase ROM of shoulder ER and abd  X 10 for HEP 
 
20 min Therapeutic Activity:  []  See flow sheet :  
Rationale: improve coordination  to improve the patients ability to manipulate small items 1/4 in pegs removed and replaced x 10 left hand, able to place x 15 with frustration but min difficulty Varied items  and place all less than 1 inch picked up and placed without errors or drops left hand 10 min Neuromuscular Re-education:  []  See flow sheet :  
Rationale: increase ROM and increase strength  to improve the patients ability to forward reach Manual cueing during forward reach to keep trunk straight and externally rotate shoulder during reach x 10 to shoulder leval and slightly above With 
 [] TE 
 [] TA 
 [] neuro 
 [] other: Patient Education: [x] Review HEP [x] Progressed/Changed HEP based on: cane abd and hor abd HEP 
 [] positioning   [] body mechanics   [] transfers   [] heat/ice application  
[] Splint wear/care   [] Sensory re-education   [] scar management     
[] other:   
 
     
Other Objective/Functional Measures:  
100% accuracy with picking up and placing small items with left hand today Pain Level (0-10 scale) post treatment: 0/10 ASSESSMENT/Changes in Function: shoulder easily fatigued but excellent improvement in fine motor skills Patient will continue to benefit from skilled OT services to modify and progress therapeutic interventions, address ROM deficits, address strength deficits, analyze and address soft tissue restrictions, analyze and cue movement patterns and instruct in home and community integration to attain remaining goals. []  See Plan of Care 
[]  See progress note/recertification 
[]  See Discharge Summary Progress towards goals / Updated goals: *1.  Patient will report improved left hand use as shown by reduced limitation on Neuro Quality of Life by at least 20% ( initially 66%). 2.  Patient will be able to perform accurate reach and grasp of 1 inch and smaller items with left hand to be able to grasp items in home without knocking over and increase safety. met 11/14/18 3.  Patient will be able to reach with left hand to place items in dresser, in fridge on counter etc. Progressing with in clinic activities 11/12/18 4.  Patient will improve  and pinches in left hand by 10# and 2-3# to help with opening packages and containers.   Progressing with graded clothes pins, therabar 10/31/181/7/18, progressing with hand helper 11/12/18 5.  Patient will be independent in home program to improve left shoulder ROm and true forward flexion 
  
 
** PLAN [x]  Upgrade activities as tolerated     [x]  Continue plan of care 
[]  Update interventions per flow sheet      
[]  Discharge due to:_ 
[]  Other:_ ELIAS Bar 11/14/2018  11:33 AM 
 
Future Appointments Date Time Provider Wolf Peters 11/16/2018 10:30 AM Alban Teague, PT [facility]  
11/16/2018 11:15 AM Jia Copping [id number] [facility]  
11/19/2018 10:30 AM [facility] PT [address] [facility]  
11/19/2018 11:15 AM Jia Copping ULLPRLA [facility]  
11/20/2018 10:30 AM Jia Copping TMUTVUL [facility]  
11/20/2018 11:30 AM Perla Loomis, PT [facility]  
11/26/2018 10:00 AM Ludwig Mcghee, PTA [facility]  
11/26/2018 10:30 AM Jia Copping [id number] [facility]  
11/29/2018 10:00 AM Jia Copping [id number] [facility]  
11/29/2018 11:00 AM Perla Loomis, PT [facility]  
11/30/2018 10:00 AM Mustapha Black, OTR/L [facility]  
11/30/2018 11:00 AM Perla Loomis, PT [facility]

## 2018-11-16 ENCOUNTER — HOSPITAL ENCOUNTER (OUTPATIENT)
Dept: PHYSICAL THERAPY | Age: 68
Discharge: HOME | End: 2018-11-16
Payer: MEDICARE

## 2018-11-16 PROCEDURE — 97530 THERAPEUTIC ACTIVITIES: CPT

## 2018-11-16 PROCEDURE — 97116 GAIT TRAINING THERAPY: CPT

## 2018-11-16 PROCEDURE — 97110 THERAPEUTIC EXERCISES: CPT

## 2018-11-16 NOTE — PROGRESS NOTES
OT DAILY TREATMENT NOTE 10-18 Patient Name: Dayo Sosa Date:2018 : 1950 [x]  Patient  Verified Payor: BLUE CROSS / Plan: [facility] PPO / Product Type: PPO / In time:111  Out time:1205 Total Treatment Time (min): 53 Visit #: 19 TQ 08 Medicare/BCBS Only Total Timed Codes (min):  53 1:1 Treatment Time:  48 Treatment Area: Muscle weakness of left upper extremity [M62.81] SUBJECTIVE Pain Level (0-10 scale): 0/10 Any medication changes, allergies to medications, adverse drug reactions, diagnosis change, or new procedure performed?: [x] No    [] Yes (see summary sheet for update) Subjective functional status/changes:   [] No changes reported My new glasses come in next Tuesday OBJECTIVE 41 min Therapeutic Exercise:  [] See flow sheet :  
Rationale: increase ROM and increase strength to improve the patients ability to reach,  Recheck for PN: , Pinches, Goals Yellow Web: Left Grasp and Pull 20x, Punch 20x Cane Exercises: B Horizontal abduction/adduction, B Shoulder flexion, B chest press 12 min Therapeutic Activity:  []  See flow sheet :  
Rationale: increase ROM and improve coordination  to improve the patients ability to manipulate small items, reach Recheck: 9 hole Perfection: Left hand- Pt placed 11 pieces into game board in approx 10 min With 
 [] TE 
 [] TA 
 [] neuro 
 [] other: Patient Education: [x] Review HEP [] Progressed/Changed HEP based on:  
[] positioning   [] body mechanics   [] transfers   [] heat/ice application  
[] Splint wear/care   [] Sensory re-education   [] scar management     
[] other:   
 
     
Other Objective/Functional Measures:  
 
  
9 Hole 
  10/29/18 11/16 Right 21 17   
Left 38 45  
     
 
 Measurements: Taken with Jan Dynamometer, in Lbs Level 2 10/1 10/24 11/16 Change Right  50 46 Left 10 10 15 +5 Pinch Measurements: Taken with Pinch Gauge, in Lbs  
 (hand) 10/1 10/24 11/16 Change 3 pt Right  11 12 Left  4 5 6 +1  
       
       
Lateral      
Right  14 15 Left 6 7 8 +1  
       
       
Tip Right  12 10 Left 4 4 5 +1  
          
          
 
 
FOTO-Neuro Quality of Life/Limitation 10/15 10/24 11/16 Date Date Score 66% 59% 55% Change  5% 10% Pain Level (0-10 scale) post treatment: 0/10 ASSESSMENT/Changes in Function: Strength slowly progressing towards goals, Patient will continue to benefit from skilled OT services to modify and progress therapeutic interventions, address ROM deficits, address strength deficits, analyze and cue movement patterns and instruct in home and community integration to attain remaining goals. []  See Plan of Care 
[]  See progress note/recertification 
[]  See Discharge Summary Progress towards goals / Updated goals: 1.  Patient will report improved left hand use as shown by reduced limitation on Neuro Quality of Life by at least 20% ( initially 66%). Reduced 10% 11/16/18 2.  Patient will be able to perform accurate reach and grasp of 1 inch and smaller items with left hand to be able to grasp items in home without knocking over and increase safety. met 11/14/18 3.  Patient will be able to reach with left hand to place items in dresser, in fridge on counter etc. Met 11/16/18 4.  Patient will improve  and pinches in left hand by 10# and 2-3# to help with opening packages and containers.    +5, All Pinches +1 11/16/18 5.  Patient will be independent in home program to improve left shoulder ROm and true forward flexion Progressing, Pt reports Max difficulty with scapular retraction/elevation 11/16/18 **Added: New Goal** 1. 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 New patient goal reviewed with OTR for approval. 
 
PLAN 
[]  Upgrade activities as tolerated     [x]  Continue plan of care []  Update interventions per flow sheet      
[]  Discharge due to:_ 
[]  Other:_ TOMAS Guadarrama/L 11/16/2018  9:24 AM 
 
Future Appointments Date Time Provider Wolf Peters 11/16/2018 10:30 AM Jody Bhatia, PT [facility]  
11/16/2018 11:15 AM Asia As[facility]NOMHI [facility]  
11/19/2018 10:30 AM [facility] PT [address] [facility]  
11/19/2018 11:15 AM Asia Asif LWTKNGA [facility]  
11/20/2018 10:30 AM Asia Asif RUAZKSB [facility]  
11/20/2018 11:30 AM Daniele Galdamez, PT [facility]  
11/26/2018 10:00 AM Kelsey Saenz PTA [facility]  
11/26/2018 10:30 AM Asia Asif MYUMMLC [facility]  
11/29/2018 10:00 AM Asia Asif MCVPSQE [facility]  
11/29/2018 11:00 AM Daniele Galdamez, PT [facility]  
11/30/2018 10:00 AM Keily Hermosillo, OTR/L [facility]  
11/30/2018 11:00 AM Daniele Galdamez, PT [facility]

## 2018-11-19 ENCOUNTER — HOSPITAL ENCOUNTER (OUTPATIENT)
Dept: PHYSICAL THERAPY | Age: 68
Discharge: HOME | End: 2018-11-19
Payer: MEDICARE

## 2018-11-19 PROCEDURE — 97530 THERAPEUTIC ACTIVITIES: CPT

## 2018-11-19 PROCEDURE — 97110 THERAPEUTIC EXERCISES: CPT

## 2018-11-19 PROCEDURE — 97116 GAIT TRAINING THERAPY: CPT

## 2018-11-19 NOTE — PROGRESS NOTES
OT DAILY TREATMENT NOTE 10-18 Patient Name: Kushal Hagen Date:2018 : 1950 [x]  Patient  Verified Payor: [facility] [employer] / Plan: Armen Candelario [facility] / Product Type: Medicare / In time:1115  Out time:1200 Total Treatment Time (min): 45 Visit #: 20 of 24 Medicare/BCBS Only Total Timed Codes (min):  35 1:1 Treatment Time:  45  
 
Treatment Area: Muscle weakness of left upper extremity [M62.81] SUBJECTIVE Pain Level (0-10 scale): 6/10 Any medication changes, allergies to medications, adverse drug reactions, diagnosis change, or new procedure performed?: [x] No    [] Yes (see summary sheet for update) Subjective functional status/changes:   [] No changes reported My shoulder is hurting from getting up and down in PT OBJECTIVE Modality rationale: decrease pain and increase tissue extensibility to improve the patients ability to reach, grasp Type Additional Details  
[] Estim:  []Unatt       []IFC  []Premod []Other:  []w/ice   []w/heat Position: Location:  
[] Estim: []Att    []TENS instruct  []NMES []Other:  []w/US   []w/ice   []w/heat Position: Location:  
[]  Traction: [] Cervical       []Lumbar 
                     [] Prone          []Supine []Intermittent   []Continuous Lbs: 
[] before manual 
[] after manual  
[]  Ultrasound: []Continuous   [] Pulsed []1MHz   []3MHz W/cm2: 
Location:  
[]  Iontophoresis with dexamethasone Location: [] Take home patch  
[] In clinic  
[]  Ice     [x]  Heat-10 min 
[]  Ice massage 
[]  Laser  
[]  Anodyne Position: Location: Left Shoulder  
[]  Laser with stim 
[]  Other:  Position: Location:  
[]  Vasopneumatic Device Pressure:       [] lo [] med [] hi  
Temperature: [] lo [] med [] hi  
[x] Skin assessment post-treatment:  [x]intact []redness- no adverse reaction 
  []redness  adverse reaction: 10 min Therapeutic Exercise:  [] See flow sheet :  
Rationale: increase ROM and increase strength to improve the patients ability to reach Cane Exercises: BUE shoulder flexion, chest press, horizontal abduction/adduction Yellow Web: Grasp and pull, punch 2x10 
 
 
25 min Therapeutic Activity:  []  See flow sheet :  
Rationale: increase ROM and improve coordination  to improve the patients ability to manipulate small items Perfection: Left hand on shortened height table secondary to increased pain in shoulder, timed With 
 [] TE 
 [] TA 
 [] neuro 
 [] other: Patient Education: [x] Review HEP [] Progressed/Changed HEP based on:  
[] positioning   [] body mechanics   [] transfers   [] heat/ice application  
[] Splint wear/care   [] Sensory re-education   [] scar management     
[] other:   
 
     
Other Objective/Functional Measures:  
 
Perfection: 16:16 Pain Level (0-10 scale) post treatment: 0/10 ASSESSMENT/Changes in Function: decreased pain with modalities Patient will continue to benefit from skilled OT services to modify and progress therapeutic interventions, address ROM deficits, address strength deficits and instruct in home and community integration to attain remaining goals. []  See Plan of Care 
[]  See progress note/recertification 
[]  See Discharge Summary Progress towards goals / Updated goals: 1.  Patient will report improved left hand use as shown by reduced limitation on Neuro Quality of Life by at least 20% ( initially 66%). Reduced 10% 11/16/18 2.  Patient will be able to perform accurate reach and grasp of 1 inch and smaller items with left hand to be able to grasp items in home without knocking over and increase safety. met 11/14/18 3.  Patient will be able to reach with left hand to place items in dresser, in fridge on counter etc. Met 11/16/18 4.  Patient will improve  and pinches in left hand by 10# and 2-3# to help with opening packages and containers.    +5, All Pinches +1 11/16/18 5.  Patient will be independent in home program to improve left shoulder ROm and true forward flexion Progressing, Pt reports Max difficulty with scapular retraction/elevation 11/16/18 
  
**Added: New Goal** 
  
1. 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 Progressing with in clinic activities 11/19/18 PLAN 
[]  Upgrade activities as tolerated     [x]  Continue plan of care 
[]  Update interventions per flow sheet      
[]  Discharge due to:_ 
[]  Other:_ MAICOL Andrews 11/19/2018  12:18 PM 
 
Future Appointments Date Time Provider Wolf Peters 11/20/2018 10:00 AM Nenita Alvarado, PT RPQUYOG [facility]  
11/20/2018 10:30 AM Shell Rosenthal [id number] [facility]  
11/26/2018 10:00 AM Stan Argueta, PTA [facility]  
11/26/2018 10:30 AM Shell Rosenthal [id number] [facility]  
11/29/2018 10:00 AM Shell Rosenthal [id number] [facility]  
11/29/2018 11:00 AM Toño Horton, PT [facility]  
11/30/2018 10:00 AM VESNA Leo Se/LORENA [facility]  
11/30/2018 11:00 AM Toño Horton, PT [facility]

## 2018-11-19 NOTE — PROGRESS NOTES
PT DAILY TREATMENT NOTE - [facility]  Patient Name: Romana Thao Date:2018 : 1950 [x]  Patient  Verified Payor: BLUE CROSS / Plan: [facility] PPO / Product Type: PPO / In time:10:32  Out time: 11:15 Total Treatment Time (min): 43 Visit #: 19 of 24 
  
   
Medicare/BCBS Only Total Timed Codes (min):  43 1:1 Treatment Time: 30 Treatment Area: Muscle weakness (generalized) [M62.81] Cerebral infarction, unspecified [I63.9] SUBJECTIVE Pain Level (0-10 scale): 0/10 Any medication changes, allergies to medications, adverse drug reactions, diagnosis change, or new procedure performed?: [x] No    [] Yes (see summary sheet for update) Subjective functional status/changes:   [] No changes reported Pt report she feels she is making progress. OBJECTIVE 
 
35 (22) min Therapeutic Exercise:  [] See flow sheet :  
Rationale: increase ROM, increase strength and improve coordination to improve the patients ability to ease with independence toward self care. 8 min Gait Traininft with HW on level surfaces with CGA and with guided left foot placement using strap Rationale: With 
 [] TE 
 [] TA 
 [] neuro 
 [] other: Patient Education: [x] Review HEP [] Progressed/Changed HEP based on:  
[] positioning   [] body mechanics   [] transfers   [] heat/ice application   
[] other:   
 
Other Objective/Functional Measures: - Manual HS and gastroc stretching prior to ambulation. 
- Guided left foot placement required during ambulation. Pain Level (0-10 scale) post treatment: 0/10 ASSESSMENT/Changes in Function:  
Patient tolerated treatment well and is progressing towards her goals. She was able to ambulate 34 ft using lex-walker with only CGA and assistance for left foot placement. Patient required Min A for right LE SLR due to fatigue and \"twinge\" pain in right TFL.   
 
Patient will continue to benefit from skilled PT services to modify and progress therapeutic interventions, address functional mobility deficits, address ROM deficits, address strength deficits, analyze and address soft tissue restrictions, analyze and cue movement patterns, analyze and modify body mechanics/ergonomics, assess and modify postural abnormalities and address imbalance/dizziness to attain remaining goals. []  See Plan of Care 
[]  See progress note/recertification 
[]  See Discharge Summary Progress towards goals / Updated goals: 1. Patient will improve FOTO score by 13 points in order to demonstrate a significant improvement in function.  
Status at last note/certification: 34 points at evaluation, will reassess at next visit Current: 2. Patient will perform sit to supine and supine to sit transfers with SBA in order to increase ease of transfers at home.  
Status at last note/certification: min A for left UE management  
Current: not met: min A (11/5/18) 3. Patient will ambulate 20 feet with hemiwalker and CGA in order to increase ease of ambulation at home.  
Status at last note/certification: Min A to manage left LE inside parallel bars Current: not met min A (11/14/18) PLAN [x]  Upgrade activities as tolerated     [x]  Continue plan of care 
[]  Update interventions per flow sheet      
[]  Discharge due to:_ 
[]  Other:_   
 
DARYL Chaney 11/19/2018  11:15 AM 
 
Future Appointments Date Time Provider Wolf Peters 11/19/2018 11:15 AM Peter Posey IKLQKOP [facility]  
11/20/2018 10:30 AM Peter Posey [id number] [facility]  
11/20/2018 11:30 AM Milon Corby, PT [facility]CP[facility]  
11/26/2018 10:00 AM Natalie Moser PTA [facility]  
11/26/2018 10:30 AM Peter Posey LIXHMIW [facility]  
11/29/2018 10:00 AM Peter MOONEYO [facility]  
11/29/2018 11:00 AM Leann Corby, PT [facility]  
11/30/2018 10:00 AM Miguelito De Leon, OTR/L [facility]  
11/30/2018 11:00 AM Leann Tavarez PT [facility]CPTP[facility]

## 2018-11-20 ENCOUNTER — HOSPITAL ENCOUNTER (OUTPATIENT)
Dept: PHYSICAL THERAPY | Age: 68
Discharge: HOME | End: 2018-11-20
Payer: MEDICARE

## 2018-11-20 PROCEDURE — 97164 PT RE-EVAL EST PLAN CARE: CPT

## 2018-11-20 PROCEDURE — 97530 THERAPEUTIC ACTIVITIES: CPT

## 2018-11-20 PROCEDURE — G8978 MOBILITY CURRENT STATUS: HCPCS

## 2018-11-20 PROCEDURE — 97116 GAIT TRAINING THERAPY: CPT

## 2018-11-20 PROCEDURE — 97110 THERAPEUTIC EXERCISES: CPT

## 2018-11-20 PROCEDURE — G8979 MOBILITY GOAL STATUS: HCPCS

## 2018-11-20 NOTE — PROGRESS NOTES
PT DAILY TREATMENT NOTE 10-18 Patient Name: Emanuel Perry Date:2018 : 1950 [x]  Patient  Verified Payor: [place] MEDICARE / Plan: Armen Lei / Product Type: Medicare / In time:10:02  Out time:10:30 Total Treatment Time (min): 28 Visit #: 20 of 24 Medicare/BCBS Only Total Timed Codes (min):  28 1:1 Treatment Time:  26  
 
 
Treatment Area: Muscle weakness (generalized) [M62.81] Cerebral infarction, unspecified [I63.9] SUBJECTIVE Pain Level (0-10 scale): 0/10 Any medication changes, allergies to medications, adverse drug reactions, diagnosis change, or new procedure performed?: [x] No    [] Yes (see summary sheet for update) Subjective functional status/changes:   [] No changes reported Pt reports that her knee is really hurting today. She does not feel up to the standing exercises, but she is willing to work on walking. There is no one at home who is able to walk with her. She needs help getting in and out of bed. She isn't able to hook her right LE under her left LE for bed mobility d/t the pain in her knee. OBJECTIVE 10 minutes Re-Evaluation 28 min Gait Training: ambulated 22', 8' and 12' with hemiwalker and Keiry Rationale: to improve ease of household negotiation With 
 [] TE 
 [] TA 
 [] neuro 
 [] other: Patient Education: [x] Review HEP [] Progressed/Changed HEP based on:  
[] positioning   [] body mechanics   [] transfers   [] heat/ice application   
[] other:   
 
Other Objective/Functional Measures:  
 
Manual gastroc stretch performed prior to ambulation Left DF PROM: 5 dgrs from neutral 
 
Left ankle DF MMT: 0/5 Left hip flexion MMT: 0/5 Sit<>supine transfer, Keiry, pt required assistance lifting left LE Required Keiry for left foot clearance with ambulation with stretch out strap wrapped around left foot and therapist assisting with toe off/swing phase on left. Educated patient regarding hip hike substitution. Placed 1/2\" heel lift in right shoe to improve left foot clearance. Continued to require Keiry during majority of gait during last trial of ambulation - Able to take 3 steps without therapist lifting left foot following placement of heel lift and education regarding hip hike Pain Level (0-10 scale) post treatment: 0/10 ASSESSMENT/Changes in Function: See Progress Note Patient will continue to benefit from skilled PT services to modify and progress therapeutic interventions, address functional mobility deficits, address ROM deficits, address strength deficits, analyze and address soft tissue restrictions, analyze and cue movement patterns, analyze and modify body mechanics/ergonomics, assess and modify postural abnormalities, address imbalance/dizziness and instruct in home and community integration to attain remaining goals. Progress towards goals / Updated goals: 1. Patient will improve FOTO score by 13 points in order to demonstrate a significant improvement in function.  
Status at last note/certification: 34 points at evaluation, will reassess at next visit Current: Will be assessed next session 11/20/18 2. Patient will perform sit to supine and supine to sit transfers with SBA in order to increase ease of transfers at home.  
Status at last note/certification: min A for left UE management  
Current: not met: min A (11/20/18) 3. Patient will ambulate 20 feet with hemiwalker and CGA in order to increase ease of ambulation at home.  
Status at last note/certification: Min A to manage left LE inside parallel bars Current: not met min A with hemiwalker (11/21/18) PLAN 
[]  Upgrade activities as tolerated     [x]  Continue plan of care 
[]  Update interventions per flow sheet      
[]  Discharge due to:_ 
[]  Other:_ Alana Rasmussen, PT 11/20/2018  9:52 AM 
 
Future Appointments Date Time Provider Wolf Peters 11/20/2018 10:00 AM Nenita Alvarado, PT [email or web address] [facility]  
11/20/2018 10:30 AM Shell Rosenthal [id number] [facility]  
11/26/2018 10:00 AM Stan Argueta, PTA MMCPTPB [facility]  
11/26/2018 10:30 AM Shell Rosenthal [id number] [facility]  
11/29/2018 10:00 AM Shell Rosenthal [id number] [facility]  
11/29/2018 11:00 AM Toño Horton, PT MMCPTPB [facility]  
11/30/2018 10:00 AM Ahmet Hernandez, OTR/L MMCPTPB [facility]  
11/30/2018 11:00 AM Toño Horton, PT MMCPTPB [facility]

## 2018-11-20 NOTE — PROGRESS NOTES
OT DAILY TREATMENT NOTE 10-18 Patient Name: Rell Levin Date:2018 : 1950 [x]  Patient  Verified Payor: [place] MEDICARE / Plan: Brunilda Beckman / Product Type: Medicare / In time:1030  Out time:1118 Total Treatment Time (min): 48 Visit #: 21 of 24 Medicare/BCBS Only Total Timed Codes (min):  40 1:1 Treatment Time:  48 Treatment Area: Muscle weakness of left upper extremity [M62.81] SUBJECTIVE Pain Level (0-10 scale): 0/10 Any medication changes, allergies to medications, adverse drug reactions, diagnosis change, or new procedure performed?: [x] No    [] Yes (see summary sheet for update) Subjective functional status/changes:   [] No changes reported I get my new glasses today OBJECTIVE Modality rationale: decrease inflammation, decrease pain and increase tissue extensibility to improve the patients ability to reach, , lift Type Additional Details  
[] Estim:  []Unatt       []IFC  []Premod []Other:  []w/ice   []w/heat Position: Location:  
[] Estim: []Att    []TENS instruct  []NMES []Other:  []w/US   []w/ice   []w/heat Position: Location:  
[]  Traction: [] Cervical       []Lumbar 
                     [] Prone          []Supine []Intermittent   []Continuous Lbs: 
[] before manual 
[] after manual  
[]  Ultrasound: []Continuous   [] Pulsed []1MHz   []3MHz W/cm2: 
Location:  
[]  Iontophoresis with dexamethasone Location: [] Take home patch  
[] In clinic  
[]  Ice     [x]  Heat-8 min 
[]  Ice massage 
[]  Laser  
[]  Anodyne Position: Location: Left Shoulder  
[]  Laser with stim 
[]  Other:  Position: Location:  
[]  Vasopneumatic Device Pressure:       [] lo [] med [] hi  
Temperature: [] lo [] med [] hi  
[] Skin assessment post-treatment:  []intact []redness- no adverse reaction 
  []redness  adverse reaction: 27 min Therapeutic Exercise:  [] See flow sheet :  
Rationale: increase ROM and increase strength to improve the patients ability to , pinch Soft Theraputty: Pt used left hand to manipulate soft theraputty to reveal and remove 1/4 in pegs 10/10 Yellow Web: Left Hand: grasp and pull, punch 15x each 13 min Therapeutic Activity:  []  See flow sheet :  
Rationale: increase ROM and improve coordination  to improve the patients ability to accurately manipulate small items, reach Perfection: Timed, using Left UE on lowered activity table With 
 [] TE 
 [] TA 
 [] neuro 
 [] other: Patient Education: [x] Review HEP [] Progressed/Changed HEP based on:  
[] positioning   [] body mechanics   [] transfers   [] heat/ice application  
[] Splint wear/care   [] Sensory re-education   [] scar management     
[] other:   
 
     
Other Objective/Functional Measures:  
 
Perfection-Left Hand 11/19 11/20 Right Left 16:16 12:36 Pain Level (0-10 scale) post treatment: 0/10 ASSESSMENT/Changes in Function: Improved accuracy and in hand manipulation, decreased c/o pain when using lowered activity table Patient will continue to benefit from skilled OT services to modify and progress therapeutic interventions, address ROM deficits, address strength deficits, analyze and cue movement patterns and instruct in home and community integration to attain remaining goals. []  See Plan of Care 
[]  See progress note/recertification 
[]  See Discharge Summary Progress towards goals / Updated goals: 1.  Patient will report improved left hand use as shown by reduced limitation on Neuro Quality of Life by at least 20% ( initially 66%). Reduced 10% 11/16/18 2.  Patient will be able to perform accurate reach and grasp of 1 inch and smaller items with left hand to be able to grasp items in home without knocking over and increase safety. met 11/14/18 3.  Patient will be able to reach with left hand to place items in dresser, in fridge on counter etc. Met 11/16/18 4.  Patient will improve  and pinches in left hand by 10# and 2-3# to help with opening packages and containers.    +5, All Pinches +1 11/16/18 5.  Patient will be independent in home program to improve left shoulder ROm and true forward flexion Progressing, Pt reports Max difficulty with scapular retraction/elevation 11/16/18 
  
**Added: New Goal** 
  
1. 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  Pt able to touch top of head using Left UE, will attempt hair care using Dry Shampoo over holiday break 11/20/18 PLAN 
[]  Upgrade activities as tolerated     [x]  Continue plan of care 
[]  Update interventions per flow sheet      
[]  Discharge due to:_ 
[]  Other:_ MAICOL Flannery 11/20/2018  8:33 AM 
 
Future Appointments Date Time Provider Wolf Peters 11/20/2018 10:00 AM Irlanda Giron, PT [id number] [facility]  
11/20/2018 10:30 AM Nivia Cook [id number] [facility]  
11/26/2018 10:00 AM Betzy Shea PTA MMCPTPB [facility]  
11/26/2018 10:30 AM Nivia Cook ASYLKCZ [facility]  
11/29/2018 10:00 AM Nivia Cook OEVAUAA [facility]  
11/29/2018 11:00 AM You Sweeney, PT MMCPTPB [facility]  
11/30/2018 10:00 AM ELIAS Tovar MMCPTPB [facility]  
11/30/2018 11:00 AM You Sweeney, PT MMCPTPB [facility]

## 2018-11-21 NOTE — PROGRESS NOTES
[facility] [address] [facility] 
[phone] fax Physical Therapy Progress Note Patient name: Florinda Gagnon Start of Care: 10/1/18 Referral source: Ayush Carty MD : 1950 Medical/Treatment Diagnosis: Muscle weakness (generalized) [M62.81] Cerebral infarction, unspecified [I63.9] Payor: Justice Part / Plan: VA MEDICARE PART A & B / Product Type: Medicare /  Onset Date:2018 Prior Hospitalization: see medical history Provider#: [id number] Medications: Verified on Patient Summary List   
Comorbidities: HTN 
 Prior Level of Function: Ind with ambulation, working, Ind with ADLs  
  
Visits from Start of Care: 20    Missed Visits: 1 Established Goals:         Excellent           Good         Limited           None 
[] Increased ROM   []  []  []  [] 
[x] Increased Strength  []  []  []  [x] [x] Increased Mobility  []  [x]  [x]  []  
[] Decreased Pain   []  []  []  [] 
[] Decreased Swelling  []  []  []  [] 
 
Key Functional Changes: slowly improving ambulatory ability with hemiwalker but continues to require Keiry for left foot clearance, continues to require Keiry for supine<>sit to assist left LE Updated Goals: to be achieved in 6 weeks: 1. Patient will improve FOTO score by 13 points in order to demonstrate a significant improvement in function.  
2. Patient will perform sit to supine and supine to sit transfers with SBA in order to increase ease of transfers at home.  
3. Patient will ambulate 20 feet with hemiwalker and CGA in order to increase ease of ambulation at home.  ASSESSMENT/RECOMMENDATIONS: 
Pt is making slow progress in therapy. Ambulatory distance is slowly improving with hemiwalker; however, she continues to require Keiry for left foot clearance. Pt has an AFO at home that she has not been using, and pt is bringing it to therapy next session for trial of ambulation with AFO. She continues to present with 0/5 hip flexion and DF strength on left, limiting left foot clearance. Trialed compensatory heel lift in right LE and hip hike compensation this visit, and pt was able to take 3 steps without therapist assisting to lift left LE. Pt continues to require Keiry for supine<>sit transfer, requiring assistance to lift left LE. Right knee pain limits patient's ability to hook right LE under left LE to assist with transfer. Pt continues to present with decreased gastroc flexibility/ankle mobility, which may limit use of AFO. Current left ankle PROM is 5 dgrs from neutral.  Pt may benefit from Dynasplint to improve left ankle DF ROM and ability to use AFO to attempt to increase left foot clearance and allow pt to ambulate. MD please sign if you agree. Pt will benefit from continued skilled PT to address remaining strength, balance, and functional mobility deficits with compensatory strategies in order to attempt to allow pt to ambulate in order to improve ease of ADLs, reduce caregiver burden, and improve QOL. [x]Continue therapy per initial plan/protocol at a frequency of  2-3 x per week for 6 weeks []Continue therapy with the following recommended changes:_____________________      _____________________________________________________________________ []Discontinue therapy progressing towards or have reached established goals []Discontinue therapy due to lack of appreciable progress towards goals []Discontinue therapy due to lack of attendance or compliance []Await Physician's recommendations/decisions regarding therapy []Other:________________________________________________________________ Thank you for this referral.  
Malik Enriquez, PT 11/21/2018 5:36 PM 
 
NOTE TO PHYSICIAN:  PLEASE COMPLETE THE ORDERS BELOW AND  
FAX TO [facility]: [phone] If you are unable to process this request in 24 hours please contact our office: [phone] ? I have read the above report and request that my patient continue as recommended. ? I have read the above report and request that my patient continue therapy with the following changes/special instructions:____________________________________ ? I have read the above report and request that my patient be discharged from therapy. Physicians signature: ______________________________Date: ______Time:______

## 2018-11-26 ENCOUNTER — HOSPITAL ENCOUNTER (OUTPATIENT)
Dept: PHYSICAL THERAPY | Age: 68
Discharge: HOME | End: 2018-11-26
Payer: MEDICARE

## 2018-11-26 PROCEDURE — 97112 NEUROMUSCULAR REEDUCATION: CPT

## 2018-11-26 PROCEDURE — 97110 THERAPEUTIC EXERCISES: CPT

## 2018-11-26 PROCEDURE — 97530 THERAPEUTIC ACTIVITIES: CPT

## 2018-11-26 PROCEDURE — 97116 GAIT TRAINING THERAPY: CPT

## 2018-11-26 NOTE — PROGRESS NOTES
PT DAILY TREATMENT NOTE 10-18 Patient Name: Shanthi Calderón Date:2018 : 1950 [x]  Patient  Verified Payor: [place] MEDICARE / Plan: Armen Hellernils / Product Type: Medicare / In time:10:00  Out time:10:30 Total Treatment Time (min): 30 Visit #: 21 of 24 Medicare/BCBS Only Total Timed Codes (min):  30 1:1 Treatment Time:  30 Treatment Area: Muscle weakness (generalized) [M62.81] Cerebral infarction, unspecified [I63.9] SUBJECTIVE Pain Level (0-10 scale): 0/10 Any medication changes, allergies to medications, adverse drug reactions, diagnosis change, or new procedure performed?: [x] No    [] Yes (see summary sheet for update) Subjective functional status/changes:   [] No changes reported Pt reports no knee pain today and wants to work on walking. She states she forgot her AFO today. She would like to see if her insurance would cover a dynasplint for stretching. She is working on having her daughter get her a hemiwalker. She wants to be able to get around her house by herself or with less assistance. OBJECTIVE 30 min Gait Training:  _20 feet then 30 __ feet with _hemiwalker__ device on level surfaces with CGA initially then Min A__ level of assist with fatigue Rationale: to improve ease of household negotiation and decrease fall risk With 
 [] TE 
 [] TA 
 [] neuro 
 [] other: Patient Education: [x] Review HEP [] Progressed/Changed HEP based on:  
[] positioning   [] body mechanics   [] transfers   [] heat/ice application   
[] other:   
 
Other Objective/Functional Measures:  
Able to use stretch strap in pt left hand and have pt advance left modified independent with cues for larger step length Started with CGA for the first 20 feet and second 20 feet but then therapist assist with left advancement for final 10 feet due to left arm fatigue Educated pt on self stretching calf at home with feet up when sitting on the couch using leg assist strap FOTO: 39 
Educated pt to bring AFO and leg strap to practice with her tools Educated on increasing standing time at home to improve endurance Discussed with OT about use of left arm for advancement of left LE; was informed by TOMAS that pt has good return distally but some shoulder pain/weakness proximally Pt gave verbal consent to contact [facility] regarding splint Pain Level (0-10 scale) post treatment: 0/10 ASSESSMENT/Changes in Function: Pt progressing with improving endurance with ambulation. She is able to advance the left LE using her left UE with a stretch strap and will be working on endurance for modified independence with ambulation. Will contact [facility] for options with pt insurance for ability to get DF splint to achieve beyond neutral for most benefit of AFO. Will continue to work on endurance and balance for safety and independence with ambulation around the home. Patient will continue to benefit from skilled PT services to modify and progress therapeutic interventions, address functional mobility deficits, address ROM deficits, address strength deficits, analyze and address soft tissue restrictions, analyze and cue movement patterns, analyze and modify body mechanics/ergonomics, assess and modify postural abnormalities, address imbalance/dizziness and instruct in home and community integration to attain remaining goals. Updated Goals: to be achieved in 6 weeks: 1. Patient will improve FOTO score by 13 points in order to demonstrate a significant improvement in function. Progressing 11 point improvement (11/26/18) 2.  Patient will perform sit to supine and supine to sit transfers with SBA in order to increase ease of transfers at home.  
3. Patient will ambulate 20 feet with hemiwalker and CGA in order to increase ease of ambulation at home. Met 20 feet with CGA; pt using left UE for left LE advancement with stretch it out strap (11/26/18) 
  
 PLAN 
[x]  Upgrade activities as tolerated     [x]  Continue plan of care 
[]  Update interventions per flow sheet      
[]  Discharge due to:_ 
[]  Other:_ Sae Mckinnon PTA 11/26/2018  9:26 AM 
 
Future Appointments Date Time Provider Wolf Peters 11/26/2018 10:00 AM Jd Spaulding PTA MMCPTPB [facility]  
11/26/2018 10:30 AM Mary Linn QIRMIXP [facility]  
11/29/2018 10:00 AM Mary Linn NBLMYOF [facility]  
11/29/2018 11:00 AM Geovanna Preciado, COLIN MMCPTPB [facility]  
11/30/2018 10:00 AM Charls Essex, OTR/LORENA MMCPTPB [facility]  
11/30/2018 11:00 AM Geovanna Preciado PT MMCPTPB [facility]

## 2018-11-26 NOTE — PROGRESS NOTES
OT DAILY TREATMENT NOTE 10-18 Patient Name: Emanuel Perry Date:2018 : 1950 [x]  Patient  Verified Payor: [place] MEDICARE / Plan: Armen Cnadelario [facility] / Product Type: Medicare / In time:1030  Out time:1118 Total Treatment Time (min): 48 Visit #: 22 of 24 Medicare/BCBS Only Total Timed Codes (min):  48 1:1 Treatment Time:  48 Treatment Area: Muscle weakness of left upper extremity [M62.81] SUBJECTIVE Pain Level (0-10 scale): 0/10 Any medication changes, allergies to medications, adverse drug reactions, diagnosis change, or new procedure performed?: [x] No    [] Yes (see summary sheet for update) Subjective functional status/changes:   [] No changes reported Oh I have one of those at home (leg ) OBJECTIVE 23 min Therapeutic Exercise:  [] See flow sheet :  
Rationale: increase ROM and increase strength to improve the patients ability to reach,  Yellow Web: Grasp and Pull, Punch Soft Theraputty: Manipulated with Left Hand to reveal and remove 1/4 in pegs 15 min Therapeutic Activity:  []  See flow sheet :  
Rationale: increase ROM and improve coordination  to improve the patients ability to manipulate small items, reach Perfection with Left Hand, timed Recheck: 9Hole, Timed 10 min Neuromuscular Re-education:  []  See flow sheet :  
Rationale: increase ROM and increase strength  to improve the patients ability to reach, functionally use Left UE Retraction/Elevation: 10x each PROM: Shoulder Flexion, Shoulder Flexion with elbow extension, Shoulder Abduction With 
 [] TE 
 [] TA 
 [] neuro 
 [] other: Patient Education: [x] Review HEP [] Progressed/Changed HEP based on:  
[] positioning   [] body mechanics   [] transfers   [] heat/ice application  
[] Splint wear/care   [] Sensory re-education   [] scar management     
[] other:   
 
     
Other Objective/Functional Measures:  
 
 Perfection-Left Hand 
   Right       
Left 16:16 12:36 10:35  
     
 
[facility] 
  10/29/18 11/16 11/26 Right 21 17    
Left 38 45 38 Pain Level (0-10 scale) post treatment: 0/10 ASSESSMENT/Changes in Function: Continued difficulty with AROM with Left Shoulder, FM speed improving Patient will continue to benefit from skilled OT services to modify and progress therapeutic interventions, address ROM deficits, address strength deficits, analyze and cue movement patterns and instruct in home and community integration to attain remaining goals. []  See Plan of Care 
[]  See progress note/recertification 
[]  See Discharge Summary Progress towards goals / Updated goals: 1.  Patient will report improved left hand use as shown by reduced limitation on Neuro Quality of Life by at least 20% ( initially 66%). Reduced 10% 11/16/18 2.  Patient will be able to perform accurate reach and grasp of 1 inch and smaller items with left hand to be able to grasp items in home without knocking over and increase safety. met 11/14/18 3.  Patient will be able to reach with left hand to place items in dresser, in fridge on counter etc. Met 11/16/18 4.  Patient will improve  and pinches in left hand by 10# and 2-3# to help with opening packages and containers.    +5, All Pinches +1 11/16/18 5.  Patient will be independent in home program to improve left shoulder ROm and true forward flexion Progressing, Pt reports Max difficulty with scapular retraction/elevation 11/26/18 
  
**Added: New Goal** 
  
1. 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  Pt able to touch top of head using Left UE, will attempt hair care using Dry Shampoo 11/26/18 PLAN 
[]  Upgrade activities as tolerated     [x]  Continue plan of care 
[]  Update interventions per flow sheet      
[]  Discharge due to:_ 
[]  Other:_ MAICOL Prieto 11/26/2018  8:40 AM 
 
Future Appointments Date Time Provider Wolf Peters 11/26/2018 10:00 AM Kelsey Saenz, PTA MMCPTPB [facility]  
11/26/2018 10:30 AM Asiatravon Asif [id number] [facility]  
11/29/2018 10:00 AM Asia Laya [email or web address] [facility]  
11/29/2018 11:00 AM Daniele Galdamez, PT MMCPTPB [facility]  
11/30/2018 10:00 AM Keily Hermosillo, OTR/L MMCPTPB [facility]  
11/30/2018 11:00 AM Daniele Galdamez, PT MMCPTPB [facility]

## 2018-11-27 NOTE — PROGRESS NOTES
[facility] [address] [facility] 
[phone] fax Occupational Therapy Progress Note Patient name: Dafne Pederson Start of Care: 10/1/18 Referral source: Susana Mandujano MD : 1950 Medical/Treatment Diagnosis: Muscle weakness of left upper extremity [M62.81] Payor: Miguelito Filler / Plan: [place] MEDICARE PART A & B / Product Type: Medicare /  Onset Date:2018 Prior Hospitalization: see medical history Provider#: [id number] Medications: Verified on Patient Summary List   
Comorbidities: *HTN** 
Prior Level of Function:*I self care home care [facility]* Visits from Start of Care: 21    Missed Visits: 1 Established Goals:         Excellent           Good         Limited           None 
[x] Increased ROM   []  [x]  []  [] 
[] Increased Strength  []  []  []  [] 
[] Increased Mobility  []  []  []  []  
[] Decreased Pain   []  []  []  [] 
[] Decreased Swelling  []  []  []  [] 
[x] Increased Fine Motor Skills []  [x]  []  [] 
[] Increased ADL [place] []  [x]  []  [] 
 
Key Functional Changes: Improved coordination, reaching accuracy Prior goals and progress: 1.  Patient will report improved left hand use as shown by reduced limitation on Neuro Quality of Life by at least 20% ( initially 66%). Reduced 10% 18 2.  Patient will be able to perform accurate reach and grasp of 1 inch and smaller items with left hand to be able to grasp items in home without knocking over and increase safety. met 18 3.  Patient will be able to reach with left hand to place items in dresser, in fridge on counter etc. Met 18 4.  Patient will improve  and pinches in left hand by 10# and 2-3# to help with opening packages and containers.    +5, All Pinches +1 18 5.  Patient will be independent in home program to improve left shoulder ROm and true forward flexion Progressing, Pt reports Max difficulty with scapular retraction/elevation 11/16/18 
  
**Added: New Goal** 
  
1. 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  Pt able to touch top of head using Left UE, will attempt hair care using Dry Shampoo over holiday break 11/20/18 Updated Goals: to be achieved in 4 weeks: 1.  Patient will report improved left hand use as shown by reduced limitation on Neuro Quality of Life by at least 20% ( initially 66%). 4.  Patient will improve  and pinches in left hand by 10# and 2-3# to help with opening packages and containers.    
5.  Patient will be independent in home program to improve left shoulder ROm and true forward flexion 1. 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   ASSESSMENT/RECOMMENDATIONS: 
[x]Continue therapy per initial plan/protocol at a frequency of  3 x per week for 4 weeks []Continue therapy with the following recommended changes:_____________________      _____________________________________________________________________ []Discontinue therapy progressing towards or have reached established goals []Discontinue therapy due to lack of appreciable progress towards goals []Discontinue therapy due to lack of attendance or compliance []Await Physician's recommendations/decisions regarding therapy []Other:________________________________________________________________ Thank you for this referral.  
ELIAS Merritt 11/27/2018 8:28 AM 
NOTE TO PHYSICIAN:  PLEASE COMPLETE THE ORDERS BELOW AND  
FAX TO [facility]: [phone] If you are unable to process this request in 24 hours please contact our office: [phone] ? I have read the above report and request that my patient continue as recommended.  
? I have read the above report and request that my patient continue therapy with the following changes/special instructions:________________________________________________________ ? I have read the above report and request that my patient be discharged from therapy.  
 
[de-identified] Signature:____________Date:_________TIME:________ 
 
[employer], Date and Time must be completed for valid certification **

## 2018-11-28 ENCOUNTER — APPOINTMENT (OUTPATIENT)
Dept: PHYSICAL THERAPY | Age: 68
End: 2018-11-28
Payer: MEDICARE

## 2018-11-29 ENCOUNTER — HOSPITAL ENCOUNTER (OUTPATIENT)
Dept: PHYSICAL THERAPY | Age: 68
Discharge: HOME | End: 2018-11-29
Payer: MEDICARE

## 2018-11-29 PROCEDURE — 97110 THERAPEUTIC EXERCISES: CPT

## 2018-11-29 PROCEDURE — 97014 ELECTRIC STIMULATION THERAPY: CPT

## 2018-11-29 PROCEDURE — 97140 MANUAL THERAPY 1/> REGIONS: CPT

## 2018-11-29 PROCEDURE — 97535 SELF CARE MNGMENT TRAINING: CPT

## 2018-11-29 NOTE — PROGRESS NOTES
OT DAILY TREATMENT NOTE 10-18 Patient Name: Maddie Edwards Date:2018 : 1950 [x]  Patient  Verified Payor: [place] MEDICARE / Plan: Armen Lei / Product Type: Medicare / In time:1000  Out time:1046 Total Treatment Time (min): 46 Visit #: 23 of 24 Medicare/BCBS Only Total Timed Codes (min):  25 1:1 Treatment Time:  46  
 
Treatment Area: Muscle weakness of left upper extremity [M62.81] SUBJECTIVE Pain Level (0-10 scale): 0/10 Any medication changes, allergies to medications, adverse drug reactions, diagnosis change, or new procedure performed?: [x] No    [] Yes (see summary sheet for update) Subjective functional status/changes:   [] No changes reported I tried that electric stuff in rehab. It was very painful. This wasn't at all. OBJECTIVE Modality rationale: decrease pain, increase tissue extensibility and increase muscle contraction/control to improve the patients ability to reach Type Additional Details  
[] Estim:  []Unatt       []IFC  []Premod []Other:  []w/ice   []w/heat Position: Location:  
[x] Estim: []Att    []TENS instruct  [x]NMES  -15 min []Other:  []w/US   []w/ice   []w/heat Position: Location: Left Shoulder  
[]  Traction: [] Cervical       []Lumbar 
                     [] Prone          []Supine []Intermittent   []Continuous Lbs: 
[] before manual 
[] after manual  
[]  Ultrasound: []Continuous   [] Pulsed []1MHz   []3MHz W/cm2: 
Location:  
[]  Iontophoresis with dexamethasone Location: [] Take home patch  
[] In clinic  
[]  Ice     [x]  heat -10 min 
[]  Ice massage 
[]  Laser  
[]  Anodyne Position: Location: left Shoulder co-current NMES  
[]  Laser with stim 
[]  Other:  Position: Location:  
[]  Vasopneumatic Device Pressure:       [] lo [] med [] hi  
Temperature: [] lo [] med [] hi  
 [x] Skin assessment post-treatment:  [x]intact []redness- no adverse reaction 
  []redness  adverse reaction:  
 
10 min Therapeutic Exercise:  [] See flow sheet :  
Rationale: increase ROM and increase strength to improve the patients ability to reach Pully- Shoulder Flexion 10x with hold Attempted to perform shoulder abduction with pully, Pt unable to perform shoulder abduction 15 min Self Care/Home Management: Rashaad Tran Rationale: increase ROM, increase strength and improve coordination  to improve the patients ability to don AFO for safety and preparation for functional ambulation for increased independence with ADL/IADL's Pt tasked with putting on Left Foot AFO Pt administered elastic shoe laces to assist with ease and safety of donning AFO With 
 [] TE 
 [] TA 
 [] neuro 
 [] other: Patient Education: [x] Review HEP [] Progressed/Changed HEP based on:  
[] positioning   [] body mechanics   [] transfers   [] heat/ice application  
[] Splint wear/care   [] Sensory re-education   [] scar management     
[] other:   
 
     
Other Objective/Functional Measures:  
 
Doff Left Sneaker: Independent; Pt used Right foot to push off left shoe Placing AFO into Sneaker: SBA for cueing, physical assist for straightening out AFO in shoe Placing Foot into AFO/Sneaker with Elastic Laces: Mod A for physical assistance with putting foot in straight Pushing Foot down into AFO/Shoe with Elastic Laces: CGA in stand Dynamic Standing Balance: Fair Strapping in AFO: Mod I, Pt able to complete task with Right UE, Pt able to perform task with using Left to fasten velcro, increased time required Pain Level (0-10 scale) post treatment: 0/10 ASSESSMENT/Changes in Function: Improved ROM with NMES/Heat Patient will continue to benefit from skilled OT services to modify and progress therapeutic interventions, address ROM deficits, address strength deficits, analyze and cue movement patterns and instruct in home and community integration to attain remaining goals. []  See Plan of Care 
[]  See progress note/recertification 
[]  See Discharge Summary Progress towards goals / Updated goals: 1.  Patient will report improved left hand use as shown by reduced limitation on Neuro Quality of Life by at least 20% ( initially 66%). Reduced 10% 11/16/18 2.  Patient will be able to perform accurate reach and grasp of 1 inch and smaller items with left hand to be able to grasp items in home without knocking over and increase safety. met 11/14/18 3.  Patient will be able to reach with left hand to place items in dresser, in fridge on counter etc. Met 11/16/18 4.  Patient will improve  and pinches in left hand by 10# and 2-3# to help with opening packages and containers.    +5, All Pinches +1 11/16/18 5.  Patient will be independent in home program to improve left shoulder ROm and true forward flexion Progressing, Pt reports Max difficulty with scapular retraction/elevation 11/28/18 
  
**Added: New Goal** 
  
1. 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  Pt able to touch top of head using Left UE, will attempt hair care using Dry Shampoo 11/26/18 PLAN 
[]  Upgrade activities as tolerated     [x]  Continue plan of care 
[]  Update interventions per flow sheet      
[]  Discharge due to:_ 
[]  Other:_ MAICOL Stewart 
11/29/2018  10:25 AM 
 
Future Appointments Date Time Provider Wolf Peters 11/29/2018 11:00 AM Darylene Betters, PT [facility]  
11/30/2018 10:00 AM ELIAS Meyers [facility]  
11/30/2018 11:00 AM Darylene Betters, PT [facility]  
12/4/2018 12:30 PM Marce Andrea PTA [facility]  
12/4/2018  1:15 PM Bridgett FORBES [facility]  
12/10/2018 10:30 AM Marce Andrea PTA [facility]  
 12/10/2018 11:00 AM Charls Essex, OTR/L [facility]  
12/12/2018  9:30 AM Charls Essex, OTR/L [facility]  
12/12/2018 10:30 AM Ml Pacheco, PT PEMSSPT [facility]  
12/18/2018 11:15 AM Mary Linn CLIBDXK [facility]  
12/18/2018 12:00 PM Jd Spaulding, PTA [facility]  
12/20/2018 10:30 AM Mary Linn QVRTHGW [facility]  
12/20/2018 11:30 AM Jd Spaulding PTA [facility]  
12/26/2018 11:00 AM Charls Essex, OTR/L [facility]  
12/26/2018 12:00 PM Jd Spaulding PTA [facility]  
12/28/2018 10:30 AM Charls Essex, OTR/L [facility]  
12/28/2018 11:30 AM Jd Spaulding PTA [facility]

## 2018-11-29 NOTE — PROGRESS NOTES
PT DAILY TREATMENT NOTE 10-18 Patient Name: Shelia Pimentel Date:2018 : 1950 [x]  Patient  Verified Payor: VA MEDICARE / Plan: Armen Candelario y / Product Type: Medicare / In time: 11:00  Out time: 11:30 Total Treatment Time (min): 30 Visit #: 22 of 24 Medicare/BCBS Only Total Timed Codes (min):  30 1:1 Treatment Time:  30 Treatment Area: Muscle weakness (generalized) [M62.81] Cerebral infarction, unspecified [I63.9] SUBJECTIVE Pain Level (0-10 scale): 0/10 Any medication changes, allergies to medications, adverse drug reactions, diagnosis change, or new procedure performed?: [x] No    [] Yes (see summary sheet for update) Subjective functional status/changes:   [] No changes reported Pt. Reports she is feeling about the same today. She has been working on standing at home. OBJECTIVE 22 min Therapeutic Exercise:  [x] See flow sheet :  
Rationale: increase ROM, increase strength and improve balance to improve the patients ability to increase ease of ADLs 8 min Manual Therapy:  Trigger point release to infraspinatus and UT Rationale: decrease pain, increase ROM and increase tissue extensibility to increase ease of ADLs With 
 [x] TE 
 [] TA 
 [] neuro 
 [] other: Patient Education: [x] Review HEP [] Progressed/Changed HEP based on:  
[] positioning   [] body mechanics   [] transfers   [] heat/ice application   
[] other:   
 
Other Objective/Functional Measures:  
Pt. ambulated 10 feet with hemiwalker and CGA with AFO She had improving left shoulder AROM following flexion She was able to perform standing left hip flexion in parallel bars without pillow case Pain Level (0-10 scale) post treatment:  0/10 ASSESSMENT/Changes in Function:  Pt. Is progressing slowly towards goals. She continues to have decreased left LE strength but was able to to steps using AFO and hemiwalker.  She continues to have decreased balance with Rhomberg standing. Patient will continue to benefit from skilled PT services to modify and progress therapeutic interventions, address functional mobility deficits, address ROM deficits, address strength deficits, analyze and address soft tissue restrictions, analyze and cue movement patterns, analyze and modify body mechanics/ergonomics and assess and modify postural abnormalities to attain remaining goals. Progress towards goals / Updated goals: 1. Patient will improve FOTO score by 13 points in order to demonstrate a significant improvement in function. Progressing 11 point improvement (11/26/18) 2. Patient will perform sit to supine and supine to sit transfers with SBA in order to increase ease of transfers at home.  
3. Patient will ambulate 20 feet with hemiwalker and CGA in order to increase ease of ambulation at home. Met 20 feet with CGA; pt using left UE for left LE advancement with stretch it out strap (11/26/18) PLAN 
[]  Upgrade activities as tolerated     [x]  Continue plan of care 
[]  Update interventions per flow sheet      
[]  Discharge due to:_ 
[]  Other:_ Frances Reynoso, PT 11/29/2018  11:37 AM 
 
Future Appointments Date Time Provider Wolf Peters 11/30/2018 10:00 AM Stacy Durán, OTR/L [facility]  
11/30/2018 11:00 AM Tello Vines PT [facility]  
12/4/2018 12:30 PM Viridiana Sinha PTA [facility]  
12/4/2018  1:15 PM Tomasa REESE [facility]  
12/10/2018 10:30 AM Viridiana Sinha PTA [facility]  
12/10/2018 11:00 AM Stacy Durán OTR/L [facility]  
12/12/2018  9:30 AM Stacy Durán, OTR/L [facility]  
12/12/2018 10:30 AM Jessica Perez, PT [facility]  
12/18/2018 11:15 AM Tomasa COURTNEY [facility]  
12/18/2018 12:00 PM Viridiana Sinha PTA [facility]  
12/20/2018 10:30 AM Tomasa SCHILLINGIZVBF [facility]  
12/20/2018 11:30 AM Viridiana Sinha PTA [facility]  
12/26/2018 11:00 AM Stacy Durán OTR/L [facility]  
 12/26/2018 12:00 PM Jacob Aparicio PTA [facility]  
12/28/2018 10:30 AM Vito Ruelas OTR/L [facility]  
12/28/2018 11:30 AM Jacob Aparicio PTA [facility]

## 2018-11-30 ENCOUNTER — HOSPITAL ENCOUNTER (OUTPATIENT)
Dept: PHYSICAL THERAPY | Age: 68
Discharge: HOME | End: 2018-11-30
Payer: MEDICARE

## 2018-11-30 PROCEDURE — 97110 THERAPEUTIC EXERCISES: CPT

## 2018-11-30 PROCEDURE — 97535 SELF CARE MNGMENT TRAINING: CPT

## 2018-11-30 PROCEDURE — 97530 THERAPEUTIC ACTIVITIES: CPT

## 2018-11-30 PROCEDURE — 97112 NEUROMUSCULAR REEDUCATION: CPT

## 2018-11-30 NOTE — PROGRESS NOTES
[facility] [address] [facility] [place] 
[phone] fax Continued Plan of Care/ Re-certification for Occupational Therapy Services Patient name: Maximo Hyman Start of Care: *10/1/18** Referral source: Davon Negrete MD : 1950 Medical/Treatment Diagnosis: Muscle weakness of left upper extremity [M62.81] Payor: Brayan Rodriguez / Plan: VA MEDICARE PART A & B / Product Type: Medicare /  Onset Date:*2018** Prior Hospitalization: see medical history Provider#: [id number] Medications: Verified on Patient Summary List   
Comorbidities: HTN Prior Level of Function:*:*I self care home care [facility] line* 
 
** Visits from Start of Care: 23    Missed Visits: 1 The Plan of Care and following information is based on the patient's current status: 
Nancy Carey will report improved left hand use as shown by reduced limitation on Neuro Quality of Life by at least 20% ( initially 66% Status at last note/certification:66% Current Status: not met Progressing  Now 55% Nancy Carey will be able to perform accurate reach and grasp of 1 inch and smaller items with left hand to be able to grasp items in home without knocking over and increase safety.  
Status at last note/certification:Unable Current Status: met Nancy Carey will be able to reach with left hand to place items in dresser, in fridge on counter etc 
Status at last note/certification:Unable Current Status: met Nancy Carey will improve  and pinches in left hand by 10# and 2-3# to help with opening packages and containers Status at last note/certification:See chart Current Status: not met Progressing,  increased 5#, pinches increased 1# each Nancy Carey will be independent in home program to improve left shoulder ROm and true forward flexion Status at last note/certification:Unaware, not doing consistently Current Status: not met Ongoing diffiuclty protracting or elevating shoulder LTG 6. Patient will report being independent with washing hair using Left UE by increasing Shoulder ROM Status at last note/certification:Unable Current Status: not met Able to reach head only Key functional changes: Improving shoulder ROM, , coordination of reach Problems/ barriers to goal attainment: Stiffness and limited A/PROM in left scapula Treatment Plan: Therapeutic exercise, Therapeutic activities, Neuromuscular re-education, Physical agent/modality, Manual therapy, Patient education and ADLs/IADLs Patient Goal (s) has been updated and includes: Be able to [employer] Goals for this certification period to be accomplished in 4 weeks: *1.  Patient will report improved left hand use as shown by reduced limitation on Neuro Quality of Life by at least 20% ( initially 66%). 4.  Patient will improve  and pinches in left hand by 10# and 2-3# to help with opening packages and containers.    
5.  Patient will be independent in home program to improve left shoulder ROm and true forward flexion 1. 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  
 
** Frequency / Duration: Patient to be seen 2-3 times per week for 4 weeks: 
 
Assessment / Recommendations:Improving hand use and reach. Ongoing difficulty with scapular mobility and resultant pain with repeated use. G-Codes (GO) Carry  Current  CK= 40-59%  Goal  CJ= 20-39% The severity rating is based on clinical judgment and the FOTO score. Certification Period: 11/20/18-12/20/18 ELIAS Lazaro 11/30/2018 11:44 AM 
 
________________________________________________________________________ I certify that the above Therapy Services are being furnished while the patient is under my care. I agree with the treatment plan and certify that this therapy is necessary. [de-identified] Signature:____________Date:_________TIME:________ 
 
[facility], Date and Time must be completed for valid certification ** Please sign and return to [facility]  [address] [facility] 
          [phone] fax

## 2018-11-30 NOTE — ANCILLARY DISCHARGE INSTRUCTIONS
OT DAILY TREATMENT NOTE 10-18 Patient Name: Shannon Myles Date:2018 : 1950 [x]  Patient  Verified Payor: [place] MEDICARE / Plan: Armen Candelario [facility] / Product Type: Medicare / In time:1000  Out time:1055 Total Treatment Time (min): 55 Visit #: 24 of 24 Medicare/BCBS Only Total Timed Codes (min):  45 1:1 Treatment Time:  45  
 
Treatment Area: Muscle weakness of left upper extremity [M62.81] SUBJECTIVE Pain Level (0-10 scale): 0/10 Any medication changes, allergies to medications, adverse drug reactions, diagnosis change, or new procedure performed?: [x] No    [] Yes (see summary sheet for update) Subjective functional status/changes:   [] No changes reported 
shouler feels better OBJECTIVE Modality rationale: decrease pain and increase muscle contraction/control to improve the patients ability to elevate scap Type Additional Details  
[] Estim:  []Unatt       []IFC  []Premod []Other:  []w/ice   []w/heat Position: Location:  
[x] Estim: []Att    []TENS instruct  [x]NMES []Other: 15 mins during fine motor task []w/US   []w/ice   []w/heat Position: Location:left shoulder  
[]  Traction: [] Cervical       []Lumbar 
                     [] Prone          []Supine []Intermittent   []Continuous Lbs: 
[] before manual 
[] after manual  
[]  Ultrasound: []Continuous   [] Pulsed []1MHz   []3MHz W/cm2: 
Location:  
[]  Iontophoresis with dexamethasone Location: [] Take home patch  
[] In clinic  
[]  Ice     [x]  heat10 mins []  Ice massage 
[]  Laser  
[]  Anodyne Position: Location:left shoulder  
[]  Laser with stim 
[]  Other:  Position: Location:  
[]  Vasopneumatic Device Pressure:       [] lo [] med [] hi  
Temperature: [] lo [] med [] hi  
[x] Skin assessment post-treatment:  [x]intact []redness- no adverse reaction 
  []redness  adverse reaction: 15 min Therapeutic Exercise:  [] See flow sheet :  
Rationale: increase strength and improve coordination to improve the patients ability to pinch Graded clothespins 6,8# x 10 with left hand on dowle tree Cane exercises rowing and abd x 10 to improve protraction and forward reach, abduction 15 min Therapeutic Activity:  []  See flow sheet :  
Rationale: improve coordination  to improve the patients ability to place small items Coins all sizes picked up and placed with left hand with 1 drop 5 min Neuromuscular Re-education:  []  See flow sheet :  
Rationale: increase ROM  to improve the patients ability to arlene Guiding of scapual during reach, shoulder hor abd and abd with left arm 10 mins Self care Lower body dressing Mod assist to don AFO. Shown adaptive technique and brace shoe modified ot increase success. With 
 [] TE 
 [] TA 
 [] neuro 
 [] other: Patient Education: [x] Review HEP [] Progressed/Changed HEP based on:  
[] positioning   [] body mechanics   [] transfers   [] heat/ice application  
[] Splint wear/care   [] Sensory re-education   [] scar management     
[] other: adaptive technique for AFO Other Objective/Functional Measures: Mod assist to don AFO No diffiuclty with  of any coins Pain Level (0-10 scale) post treatment: 0/10 ASSESSMENT/Changes in Function: Improving shoulder motion, self care, still with very tight pecs and limited elevation Patient will continue to benefit from skilled OT services to modify and progress therapeutic interventions, address ROM deficits, address strength deficits, analyze and address soft tissue restrictions, analyze and cue movement patterns and instruct in home and community integration to attain remaining goals. []  See Plan of Care 
[]  See progress note/recertification 
[]  See Discharge Summary Progress towards goals / Updated goals: 1.  Patient will report improved left hand use as shown by reduced limitation on Neuro Quality of Life by at least 20% ( initially 66%). Reduced 10% 11/16/18 2.  Patient will be able to perform accurate reach and grasp of 1 inch and smaller items with left hand to be able to grasp items in home without knocking over and increase safety. met 11/14/18 3.  Patient will be able to reach with left hand to place items in dresser, in fridge on counter etc. Met 11/16/18 4.  Patient will improve  and pinches in left hand by 10# and 2-3# to help with opening packages and containers.    +5, All Pinches +1 11/16/18 5.  Patient will be independent in home program to improve left shoulder ROm and true forward flexion Progressing, Pt reports Max difficulty with scapular retraction/elevation 11/28/18 
  
**Added: New Goal** 
  
1. 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  Pt able to touch top of head using Left UE, will attempt hair care using Dry Shampoo 11/26/18 PLAN [x]  Upgrade activities as tolerated     [x]  Continue plan of care 
[]  Update interventions per flow sheet      
[]  Discharge due to:_ 
[]  Other:_ Ciara Benitez, OTR/L 11/30/2018  10:39 AM 
 
Future Appointments Date Time Provider Wolf Peters 11/30/2018 11:00 AM Sheridan Shirley PT [facility]  
12/4/2018 12:30 PM Margot Gil PTA [facility]  
12/4/2018  1:15 PM Bridget JARA [facility]  
12/10/2018 10:30 AM Margot Gil PTA [facility]  
12/10/2018 11:00 AM Lady Canada OTR/L [facility]  
12/12/2018  9:30 AM Lady Canada OTR/L [facility]  
12/12/2018 10:30 AM Tyler Alicia, PT [facility]  
12/18/2018 11:15 AM Tereasa Daubs [id number] [facility]  
12/18/2018 12:00 PM Margot Gil, PTA [facility]  
12/20/2018 10:30 AM Tereasa Daubs [id number] [facility]  
12/20/2018 11:30 AM Margot Gil, PTA [facility]  
 12/26/2018 11:00 AM McIntyre Noelle, OTR/L [facility]  
12/26/2018 12:00 PM Merline Lark, PTA [facility]  
12/28/2018 10:30 AM Giselle Noelle, OTR/L [facility]  
12/28/2018 11:30 AM Merline Lark, PTA [facility]

## 2018-11-30 NOTE — PROGRESS NOTES
PT DAILY TREATMENT NOTE 10-18 Patient Name: Joan Merchant Date:2018 : 1950 [x]  Patient  Verified Payor: [place] MEDICARE / Plan: Armen Candelario y / Product Type: Medicare / In time: 11:00  Out time: 11:30 Total Treatment Time (min): 30 Visit #: 23 of 24 Treatment Area: Muscle weakness (generalized) [M62.81] Cerebral infarction, unspecified [I63.9] SUBJECTIVE Pain Level (0-10 scale):  0/10 Any medication changes, allergies to medications, adverse drug reactions, diagnosis change, or new procedure performed?: [x] No    [] Yes (see summary sheet for update) Subjective functional status/changes:   [] No changes reported Pt. Reports she is doing good today but is tired. OBJECTIVE 8 Therapeutic exercise: see flow sheet For increased ankle mobility for increased ease of ambulation 22 min Neuromuscular Re-education:  [x]  See flow sheet :  
Rationale: increase strength, improve coordination and improve balance  to improve the patients ability to increase ease of ambulation With 
 [x] TE 
 [] TA 
 [] neuro 
 [] other: Patient Education: [x] Review HEP [] Progressed/Changed HEP based on:  
[] positioning   [] body mechanics   [] transfers   [] heat/ice application   
[] other:   
 
Other Objective/Functional Measures:  
Pt. Tolerated ambulation less today both with strap and without secondary to fatigue She require assistance for standing left hip flexion today Pt. Continues to have hyperextension with weight shift to left side and decreased stance time on left side She had mild knee pain on left during sit to stand transfers with facilitation to weight bear through left LE  
 
Pain Level (0-10 scale) post treatment: 0/10 ASSESSMENT/Changes in Function:  Pt. Is progressing slowly towards goals. Ambulation continues to be limited by significant decrease in left LE strength and balance.  She continues to ambulate best with a strap to bring left LE forward. Patient will continue to benefit from skilled PT services to modify and progress therapeutic interventions, address functional mobility deficits, address ROM deficits, address strength deficits, analyze and address soft tissue restrictions, analyze and cue movement patterns, analyze and modify body mechanics/ergonomics and assess and modify postural abnormalities to attain remaining goals. Progress towards goals / Updated goals: 1. Patient will improve FOTO score by 13 points in order to demonstrate a significant improvement in function. Progressing 11 point improvement (11/26/18) 2. Patient will perform sit to supine and supine to sit transfers with SBA in order to increase ease of transfers at home.  
3. Patient will ambulate 20 feet with hemiwalker and CGA in order to increase ease of ambulation at home. Met 20 feet with CGA; pt using left UE for left LE advancement with stretch it out strap (11/26/18) PLAN 
[]  Upgrade activities as tolerated     [x]  Continue plan of care 
[]  Update interventions per flow sheet      
[]  Discharge due to:_ 
[]  Other:_ Darcie Slaughter, PT 11/30/2018  1:42 PM 
 
Future Appointments Date Time Provider Wolf Peters 12/4/2018 12:30 PM June Christianson PTA [facility]  
12/4/2018  1:15 PM Morocho Lose [id number] [facility]  
12/10/2018 10:30 AM June Christianson PTA [facility]  
12/10/2018 11:00 AM Major Drivers, OTR/L [facility]  
12/12/2018  9:30 AM Major Drivers, OTR/L [facility]  
12/12/2018 10:30 AM Genevieve Cox PT [facility]  
12/18/2018 11:15 AM Morocho Lose [id number] [facility]  
12/18/2018 12:00 PM June Christianson PTA [facility]  
12/20/2018 10:30 AM Morocho Lose [id number] [facility]  
12/20/2018 11:30 AM June Christianson, PTA [facility]  
12/26/2018 11:00 AM Major Drivers, OTR/L [facility]  
12/26/2018 12:00 PM June Christianson, PTA [facility]  
12/28/2018 10:30 AM Major Drivers, OTR/L [facility]  
 12/28/2018 11:30 AM Shamika Guadalupe, PTA [facility]

## 2018-12-04 ENCOUNTER — HOSPITAL ENCOUNTER (OUTPATIENT)
Dept: PHYSICAL THERAPY | Age: 68
Discharge: HOME | End: 2018-12-04
Payer: MEDICARE

## 2018-12-04 PROCEDURE — 97110 THERAPEUTIC EXERCISES: CPT

## 2018-12-04 PROCEDURE — 97116 GAIT TRAINING THERAPY: CPT

## 2018-12-04 PROCEDURE — 97535 SELF CARE MNGMENT TRAINING: CPT

## 2018-12-04 NOTE — PROGRESS NOTES
PT DAILY TREATMENT NOTE 10-18 Patient Name: Florinda Gagnon Date:2018 : 1950 [x]  Patient  Verified Payor: [place] MEDICARE / Plan: Armen Lei / Product Type: Medicare / In time:12:30 Out time:1:10 Total Treatment Time (min): 40 Visit #: 12 of  (per MD note signed 10/30/18) Medicare/BCBS Only Total Timed Codes (min):  40 1:1 Treatment Time: 40 Treatment Area: Muscle weakness (generalized) [M62.81] SUBJECTIVE Pain Level (0-10 scale): 0/10 Any medication changes, allergies to medications, adverse drug reactions, diagnosis change, or new procedure performed?: [x] No    [] Yes (see summary sheet for update) Subjective functional status/changes:   [] No changes reported Pt reports she isn't feeling any pain today just doesn't want to be here today. She notes she likes the earlier appt times and feels off today. She got her hemiwalker in the mail yesterday. She has hardwood floors in the house to walk on. OBJECTIVE 10 min Therapeutic Exercise:  [x] See flow sheet :  
Rationale: increase ROM, increase strength, improve coordination, improve balance and increase proprioception to improve the patients ability to ambulate with LRAD and improved independence 30 min Gait Trainin', 34', 23'___ feet with _hemiwalker__ device on level surfaces with _CGA__ level of assist using leg lift strap to advance left LE 
 
15' with Min A for advancing left leg due to left arm weakness using hemiwalker Rationale: to improve independence with ambulation using LRAD and decreased fall risk With 
 [] TE 
 [] TA 
 [] neuro 
 [] other: Patient Education: [x] Review HEP [] Progressed/Changed HEP based on:  
[] positioning   [] body mechanics   [] transfers   [] heat/ice application   
[] other:   
 
Other Objective/Functional Measures:  
Ambulation distance limited due to right hip soreness Stretched prior to gait training Wore left AFO during ambulation Decreased step length B likely due to fatigue of the left arm for advancing the left leg Pain Level (0-10 scale) post treatment: 0/10 ASSESSMENT/Changes in Function: Pt is progressing with ambulation distance using hemiwalker, left leg lift strap and CGA. She has decreased step length due to decreased left arm strength to advance the left LE with strap. She has good standing and ambulating balance and is aware of impairments for safety. Will continue working on gait training with less assistance to improve safety and endurance for household ambulation on hardwood floors. Progress towards goals / Updated goals: 1. Patient will improve FOTO score by 13 points in order to demonstrate a significant improvement in function. Progressing 11 point improvement (11/26/18) 2. Patient will perform sit to supine and supine to sit transfers with SBA in order to increase ease of transfers at home.  
3. Patient will ambulate 20 feet with hemiwalker and CGA in order to increase ease of ambulation at home. Met 20 feet with CGA; pt using left UE for left LE advancement with stretch it out strap (11/26/18) PLAN [x]  Upgrade activities as tolerated     [x]  Continue plan of care 
[]  Update interventions per flow sheet      
[]  Discharge due to:_ 
[]  Other:_ Grzegorz Yanez PTA 12/4/2018  11:34 AM 
 
Future Appointments Date Time Provider Wolf Peters 12/4/2018 12:30 PM Natalie Moser PTA [facility]  
12/4/2018  1:15 PM Peter Posey [id number] [facility]  
12/10/2018 10:30 AM Natalie Moser PTA [facility]  
12/10/2018 11:00 AM Miguelito De Leon OTR/L [facility]  
12/12/2018  9:30 AM Miguelito De Leon OTR/L [facility]  
12/12/2018 10:30 AM Teresa Ludwig PT [facility]  
12/18/2018 11:15 AM Peter Posey [id number] [facility]  
12/18/2018 12:00 PM Natalie Moser PTA [facility]  
12/20/2018 10:30 AM Peter Posey [id number] [facility]  
12/20/2018 11:30 AM Natalie Moser PTA [facility]  
 12/26/2018 11:00 AM Hudson Barraza, OTR/L [facility]  
12/26/2018 12:00 PM Edelmira Myers PTA [facility]  
12/28/2018 10:30 AM Hudson Barraza, OTR/L [facility]  
12/28/2018 11:30 AM Edelmira Myers PTA [facility]

## 2018-12-04 NOTE — PROGRESS NOTES
OT DAILY TREATMENT NOTE 10-18 Patient Name: Baylee Moss Date:2018 : 1950 [x]  Patient  Verified Payor: [place] MEDICARE / Plan: Armen Candelario [facility] / Product Type: Medicare / In time:115  Out time:206 Total Treatment Time (min): 51 Visit #: 1 of  Medicare/BCBS Only Total Timed Codes (min):  51 1:1 Treatment Time:  51 Treatment Area: Muscle weakness (generalized) [M62.81] SUBJECTIVE Pain Level (0-10 scale): 0/10 Any medication changes, allergies to medications, adverse drug reactions, diagnosis change, or new procedure performed?: [x] No    [] Yes (see summary sheet for update) Subjective functional status/changes:   [] No changes reported MY shoulder is feeling good. I need to get me one of them canes. OBJECTIVE Modality rationale: decrease pain, increase tissue extensibility and increase muscle contraction/control to improve the patients ability to reach Type Additional Details  
[] Estim:  []Unatt       []IFC  []Premod []Other:  []w/ice   []w/heat Position: Location:  
[x] Estim: []Att    []TENS instruct  [x]NMES []Other:  []w/US   []w/ice   [x]w/heat Position: Location:Left Shoulder  
[]  Traction: [] Cervical       []Lumbar 
                     [] Prone          []Supine []Intermittent   []Continuous Lbs: 
[] before manual 
[] after manual  
[]  Ultrasound: []Continuous   [] Pulsed []1MHz   []3MHz W/cm2: 
Location:  
[]  Iontophoresis with dexamethasone Location: [] Take home patch  
[] In clinic  
[]  Ice     []  heat 
[]  Ice massage 
[]  Laser  
[]  Anodyne Position: Location:  
[]  Laser with stim 
[]  Other:  Position: Location:  
[]  Vasopneumatic Device Pressure:       [] lo [] med [] hi  
Temperature: [] lo [] med [] hi  
[] Skin assessment post-treatment:  []intact []redness- no adverse reaction 
  []redness  adverse reaction: 36 min Therapeutic Exercise:  [] See flow sheet :  
Rationale: increase ROM and increase strength to improve the patients ability to , pinch, reach AROM Shoulder Flexion/Extension 10x Yellow Web: Grasp and pull 10x, Punch 10x Soft Theraputty: During NMES/Heat Pt used left hand to manipulate soft theraputty to reveal and remove 1/4 in peg 
 
 
 
15 min Self Care/Home Management: LB Dressing Rationale: increase ROM, increase strength and improve coordination  to improve the patients ability to don/doff AFO, Shoes Pt doffed AFO Don/Doff Left Shoe x2 With 
 [] TE 
 [] TA 
 [] neuro 
 [] other: Patient Education: [x] Review HEP [] Progressed/Changed HEP based on:  
[] positioning   [] body mechanics   [] transfers   [] heat/ice application  
[] Splint wear/care   [] Sensory re-education   [] scar management     
[] other:   
 
     
Other Objective/Functional Measures:  
 
Doff AFO: CGA while seated in w/c Doff Left Sneaker: Mod I Don Left Sneaker: Mod A with use of elastic shoelaces, reacher, long handled shoe horn Sit <> Stand: Mod I Chair to W/C: SBA for safety, Pt attempted transfer without locking w/c brakes, Pt education on wheelchair safety Pain Level (0-10 scale) post treatment: 0/10 ASSESSMENT/Changes in Function: Overall decrease in pain in left shoulder with movement, improvement with doffing AFO Patient will continue to benefit from skilled OT services to modify and progress therapeutic interventions, address ROM deficits, address strength deficits, analyze and cue movement patterns and instruct in home and community integration to attain remaining goals. []  See Plan of Care 
[]  See progress note/recertification 
[]  See Discharge Summary Progress towards goals / Updated goals: 1.  Patient will report improved left hand use as shown by reduced limitation on Neuro Quality of Life by at least 20% ( initially 66%). Reduced 10% 11/16/18 2.  Patient will be able to perform accurate reach and grasp of 1 inch and smaller items with left hand to be able to grasp items in home without knocking over and increase safety. met 11/14/18 3.  Patient will be able to reach with left hand to place items in dresser, in fridge on counter etc. Met 11/16/18 4.  Patient will improve  and pinches in left hand by 10# and 2-3# to help with opening packages and containers.    +5, All Pinches +1 11/16/18 5.  Patient will be independent in home program to improve left shoulder ROm and true forward flexion Progressing, Pt reports Max difficulty with scapular retraction/elevation 12/4/18 
  
**Added: New Goal** 
  
1. 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  Pt able to touch top of head using Left UE, will attempt hair care using Dry Shampoo 11/26/18 PLAN 
[]  Upgrade activities as tolerated     [x]  Continue plan of care 
[]  Update interventions per flow sheet      
[]  Discharge due to:_ 
[]  Other:_ MAICOL Boyd 12/4/2018  1:30 PM 
 
Future Appointments Date Time Provider Wolf Peters 12/10/2018 10:30 AM Stefano Asencio PTA [facility]  
12/10/2018 11:00 AM Elo Rudolph, OTR/L [facility]  
12/12/2018  9:30 AM Elo Rudolph OTR/L [facility]  
12/12/2018 10:30 AM Scottie Middleton PT [id number] [facility]  
12/18/2018 11:15 AM Juvencio Tate [id number] [facility]  
12/18/2018 12:00 PM Stefano Asencio PTA [facility]  
12/20/2018 10:30 AM Juvencio Tate [id number] [facility]  
12/20/2018 11:30 AM Stefano Asencio PTA [facility]  
12/26/2018 11:00 AM Elo Rudolph, OTR/L [facility]  
12/26/2018 12:00 PM Stefano Asencio, PTA [facility]  
12/28/2018 10:30 AM Elo Rudolph, OTR/L [facility]  
12/28/2018 11:30 AM Stefano Asencio, PTA [facility]

## 2018-12-04 NOTE — PROGRESS NOTES
[facility] [address] [facility] 
[phone] fax Continued Plan of Care/ Re-certification for Physical Therapy Services Patient name: Shannon Myles Start of Care: 10/1/18 Referral source: MD Ashley Kaur MD 
 : 1950 Medical/Treatment Diagnosis: Muscle weakness (generalized) [M62.81] Cerebral infarction, unspecified [I63.9] Payor: Milena Pacheco / Plan: [place] MEDICARE [place] & B / Product Type: Medicare /  Onset Date: 2018 Prior Hospitalization: see medical history Provider#: [id number] Medications: Verified on Patient Summary List   
Comorbidities: HTN 
 Prior Level of Function: Ind with ambulation, working, Ind with ADLs  
  
Visits from Start of Care: 20    Missed Visits: 1 The Plan of Care and following information is based on the patient's current status: 
Goal: Patient will improve FOTO score by 13 points in order to demonstrate a significant improvement in function.  
Status at last note/certification: Will be assessed next session Current Status: not met Goal: Patient will perform sit to supine and supine to sit transfers with SBA in order to increase ease of transfers at home.  
Status at last note/certification: not met: min A Current Status: not met Goal: Patient will ambulate 20 feet with hemiwalker and CGA in order to increase ease of ambulation at home.  
Status at last note/certification: not met min A with hemiwalker Current Status: not met Key functional changes: slowly improving ambulatory ability with hemiwalker but continues to require Keiry for left foot clearance, continues to require Keiry for supine<>sit to assist left LE  
 
Problems/ barriers to goal attainment: none Problem List: pain affecting function, decrease ROM, decrease strength, impaired gait/ balance, decrease ADL/ functional abilitiies, decrease activity tolerance, decrease flexibility/ joint mobility and decrease transfer abilities Treatment Plan: Therapeutic exercise, Therapeutic activities, Neuromuscular re-education, Physical agent/modality, Gait/balance training, Manual therapy, Patient education, Self Care training, Functional mobility training, Home safety training and Stair training Patient Goal (s) has been updated and includes: 1. Patient will improve FOTO score by 13 points in order to demonstrate a significant improvement in function.  
2. Patient will perform sit to supine and supine to sit transfers with SBA in order to increase ease of transfers at home.  
3. Patient will ambulate 20 feet with hemiwalker and CGA in order to increase ease of ambulation at home. Goals for this certification period to be accomplished in 12 weeks: 1. Patient will improve FOTO score by 13 points in order to demonstrate a significant improvement in function.  
2. Patient will perform sit to supine and supine to sit transfers with SBA in order to increase ease of transfers at home.  
3. Patient will ambulate 20 feet with hemiwalker and CGA in order to increase ease of ambulation at home. Frequency / Duration: Patient to be seen 2-3 times per week for 12 weeks: 
Assessment / Recommendations: Pt is making slow progress in therapy. Ambulatory distance is slowly improving with hemiwalker; however, she continues to require Keiry for left foot clearance. Pt has an AFO at home that she has not been using, and pt is bringing it to therapy next session for trial of ambulation with AFO. She continues to present with 0/5 hip flexion and DF strength on left, limiting left foot clearance. Trialed compensatory heel lift in right LE and hip hike compensation this visit, and pt was able to take 3 steps without therapist assisting to lift left LE.   Pt continues to require Keiry for supine<>sit transfer, requiring assistance to lift left LE. Right knee pain limits patient's ability to hook right LE under left LE to assist with transfer. Pt continues to present with decreased gastroc flexibility/ankle mobility, which may limit use of AFO. Current left ankle PROM is 5 dgrs from neutral.  Pt may benefit from Dynasplint to improve left ankle DF ROM and ability to use AFO to attempt to increase left foot clearance and allow pt to ambulate. MD please sign if you agree. Pt will benefit from continued skilled PT to address remaining strength, balance, and functional mobility deficits with compensatory strategies in order to attempt to allow pt to ambulate in order to improve ease of ADLs, reduce caregiver burden, and improve QOL. G-Codes (GP) Mobility  Current  CK= 40-59%   Goal  CK= 40-59% The severity rating is based on clinical judgment and the FOTO score. Certification Period: 11/20/18 to 2/18/19 Tray Pérez, PT 12/4/2018 11:57 AM 
 
This note is to replace progress note from 11/20/18. Pt now has Medicare Part B, and this note meets Medicare's requirements for re-certification. ________________________________________________________________________ I certify that the above Therapy Services are being furnished while the patient is under my care. I agree with the treatment plan and certify that this therapy is necessary. [] I have read the above and request that my patient continue as recommended. [] I have read the above report and request that my patient continue therapy with the following changes/special instructions: _______________________________________ [] I have read the above report and request that my patient be discharged from therapy [de-identified] Signature:____________Date:_________TIME:________ 
 
[employer], Date and Time must be completed for valid certification ** Please sign and return to [facility] [address] [facility] 
[phone] fax

## 2018-12-10 ENCOUNTER — APPOINTMENT (OUTPATIENT)
Dept: PHYSICAL THERAPY | Age: 68
End: 2018-12-10
Payer: MEDICARE

## 2018-12-12 ENCOUNTER — HOSPITAL ENCOUNTER (OUTPATIENT)
Dept: PHYSICAL THERAPY | Age: 68
Discharge: HOME | End: 2018-12-12
Payer: MEDICARE

## 2018-12-12 PROCEDURE — 97110 THERAPEUTIC EXERCISES: CPT

## 2018-12-12 PROCEDURE — 97530 THERAPEUTIC ACTIVITIES: CPT

## 2018-12-12 NOTE — PROGRESS NOTES
PT DAILY TREATMENT NOTE 10-18    Patient Name: Kailey Cortes  Date:2018  : 1950  [x]  Patient  Verified  Payor: Marce Anaya / Plan: [place] MEDICARE PART A & B / Product Type: Medicare /    In time:10:30  Out time:11:00  Total Treatment Time (min): 30  Visit #: 1 of 24-36    Medicare/BCBS Only   Total Timed Codes (min):  30 1:1 Treatment Time:  30       Treatment Area: Muscle weakness (generalized) [M62.81]    SUBJECTIVE  Pain Level (0-10 scale): 0/10  Any medication changes, allergies to medications, adverse drug reactions, diagnosis change, or new procedure performed?: [x] No    [] Yes (see summary sheet for update)  Subjective functional status/changes:   [] No changes reported  Pt reports soreness on the front of her left hip. At end of session daughter requested help and work on car transfers and stairs for the left LE. She has a hemiwalker now but was rushed out the door and also forgot her AFO. OBJECTIVE    30 min Therapeutic Exercise:  [x] See flow sheet :   Rationale: increase ROM, increase strength, improve coordination, improve balance and increase proprioception to improve the patients ability to perform transfers and stairs with less difficulty          With   [] TE   [] TA   [] neuro   [] other: Patient Education: [x] Review HEP    [] Progressed/Changed HEP based on:   [] positioning   [] body mechanics   [] transfers   [] heat/ice application    [] other:      Other Objective/Functional Measures:   Educated on stretching to the left hip flexor and tennis ball massage  Trial of independent hip flexor activation; trace activation but mainly iliopsoas activation   Trial of QS but pt unable to perform  Mod independent transfer to table using hemiwalker    Pain Level (0-10 scale) post treatment: 0/10    ASSESSMENT/Changes in Function: Pt progressing mod independence with w/c<>table transfers. She continues to have trace hip flexion activation and nothing distally.  She will continue to benefit to work on functional tasks like stairs, transfers and ambulation to assist and safely perform at home with daughter. Progress towards goals / Updated goals:  1. Patient will improve FOTO score by 13 points in order to demonstrate a significant improvement in function. Progressing 11 point improvement (11/26/18)  2.  Patient will perform sit to supine and supine to sit transfers with SBA in order to increase ease of transfers at home.   3. Patient will ambulate 20 feet with hemiwalker and CGA in order to increase ease of ambulation at home. Met 20 feet with CGA; pt using left UE for left LE advancement with stretch it out strap (11/26/18)    PLAN  [x]  Upgrade activities as tolerated     [x]  Continue plan of care  []  Update interventions per flow sheet       []  Discharge due to:_  []  Other:_      Ida Harrison PTA 12/12/2018  2:51 PM    Future Appointments   Date Time Provider Wolf Peters   12/18/2018 11:15 AM Asia MELVIN [facility]   12/18/2018 12:00 PM Kelsey Saenz PTA [facility]   12/20/2018 10:30 AM Asia ELIAS [facility]   12/20/2018 11:30 AM Kelsey Saenz PTA [facility]   12/26/2018 11:00 AM Keily Hermosillo OTR/L [facility]   12/26/2018 12:00 PM Kelsey Saenz PTA [facility]   12/28/2018 10:30 AM Keily Hermosillo OTR/L [facility]   12/28/2018 11:30 AM Kelsey Saenz PTA [facility]

## 2018-12-12 NOTE — PROGRESS NOTES
OT DAILY TREATMENT NOTE 10-18    Patient Name: Shelia Pimentel  Date:2018  : 1950  [x]  Patient  Verified  Payor: Milana Van / Plan: [place] MEDICARE PART A & B / Product Type: Medicare /    In time:935  Out time:1015  Total Treatment Time (min): 40  Visit #: 2 of     Medicare/BCBS Only   Total Timed Codes (min):  40 1:1 Treatment Time:  40     Treatment Area: Muscle weakness (generalized) [M62.81]    SUBJECTIVE  Pain Level (0-10 scale): 0/10  Any medication changes, allergies to medications, adverse drug reactions, diagnosis change, or new procedure performed?: [x] No    [] Yes (see summary sheet for update)  Subjective functional status/changes:   [] No changes reported  Shoulder not bothering me  I need ot work on my swing (arm)  I am doing pretty well with self care. Can get myself dressed with just a little help to get pants over feet, do bath at side of bed no help, get on and off BSC and get pants up ok. Don't like to cook    OBJECTIVE      15 min Therapeutic Exercise:  [] See flow sheet :   Rationale: increase strength to improve the patients ability to grasp  Med soft putty HEP to improve  and pinch strength in left hand    25 min Therapeutic Activity:  []  See flow sheet :   Rationale: increase ROM and improve coordination  to improve the patients ability to move left arm freely  Sock ball toss left hand overhead method with mod difficulty with throwing past feet  Beach ball catch no difficulty, throw with minimal use of left hand to push  1/4 in pegs placed with min difficulty x 50, digit to palm with min difficulty up to 10 pegs. Attempted palm to digit with max difficulty.        With   [x] TE   [] TA   [] neuro   [] other: Patient Education: [x] Review HEP    [] Progressed/Changed HEP based on: putty HEP  [] positioning   [] body mechanics   [] transfers   [] heat/ice application   [] Splint wear/care   [] Sensory re-education   [] scar management      [] other: Other Objective/Functional Measures:   Frequent drops with attempts at palm to digit translation-dropped 50%  Poor ability to extend shoulder for underhand throw, timing of release impaired both overhand and underhand       Pain Level (0-10 scale) post treatment: 0/10    ASSESSMENT/Changes in Function: Improving fine motor skills with translation and repositioning     Patient will continue to benefit from skilled OT services to modify and progress therapeutic interventions, address ROM deficits, address strength deficits, analyze and address soft tissue restrictions, analyze and cue movement patterns and instruct in home and community integration to attain remaining goals. []  See Plan of Care  []  See progress note/recertification  []  See Discharge Summary         Progress towards goals / Updated goals:  *1.  Patient will report improved left hand use as shown by reduced limitation on Neuro Quality of Life by at least 20% ( initially 66%). Reduced 10% 11/16/18  2.  Patient will be able to perform accurate reach and grasp of 1 inch and smaller items with left hand to be able to grasp items in home without knocking over and increase safety. met 11/14/18  3.  Patient will be able to reach with left hand to place items in dresser, in fridge on counter etc. Met 11/16/18  4.  Patient will improve  and pinches in left hand by 10# and 2-3# to help with opening packages and containers.    +5, All Pinches +1 11/16/18, progressing with putty 12/12/18 HEP  5.  Patient will be independent in home program to improve left shoulder ROm and true forward flexion Progressing, Pt reports Max difficulty with scapular retraction/elevation 12/4/18, ongoing difficulty during tossing task 12/12/18     **Added: New Goal**     1. 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  Pt able to touch top of head using Left UE, will attempt hair care using Dry Shampoo 11/26/18       **    PLAN  []  Upgrade activities as tolerated     []  Continue plan of care  []  Update interventions per flow sheet       []  Discharge due to:_  []  Other:_      Vicki Franco, OTR/L 12/12/2018  10:27 AM    Future Appointments   Date Time Provider Wolf Peters   12/12/2018 10:30 AM Sneha Rivas PTA [facility]   12/18/2018 11:15 AM Patience Al [id number]   12/18/2018 12:00 PM Sneha Rivas PTA [facility]   12/20/2018 10:30 AM Patience JIMÉNEZ [facility]   12/20/2018 11:30 AM Sneha Rivas, IRVIN [facility]   12/26/2018 11:00 AM Jorge A Tilley, OTR/L [facility]   12/26/2018 12:00 PM Sneha Rivas, PTA [facility]   12/28/2018 10:30 AM Jorge A Tilley, OTR/L [facility]   12/28/2018 11:30 AM Sneha Rivas, PTA [facility]

## 2018-12-18 ENCOUNTER — HOSPITAL ENCOUNTER (OUTPATIENT)
Dept: PHYSICAL THERAPY | Age: 68
Discharge: HOME | End: 2018-12-18
Payer: MEDICARE

## 2018-12-18 PROCEDURE — 97110 THERAPEUTIC EXERCISES: CPT

## 2018-12-18 PROCEDURE — 97530 THERAPEUTIC ACTIVITIES: CPT

## 2018-12-18 PROCEDURE — 97116 GAIT TRAINING THERAPY: CPT

## 2018-12-18 NOTE — PROGRESS NOTES
PT DAILY TREATMENT NOTE 10-18    Patient Name: Baylee Moss  Date:2018  : 1950  [x]  Patient  Verified  Payor: VA MEDICARE / Plan: [place] MEDICARE PART A & B / Product Type: Medicare /    In time:12:05  Out time:12:30  Total Treatment Time (min): 25  Visit #: 2 of 24-36    Medicare/BCBS Only   Total Timed Codes (min):  25 1:1 Treatment Time:  25       Treatment Area: Muscle weakness (generalized) [M62.81]    SUBJECTIVE  Pain Level (0-10 scale): 0/10  Any medication changes, allergies to medications, adverse drug reactions, diagnosis change, or new procedure performed?: [x] No    [] Yes (see summary sheet for update)  Subjective functional status/changes:   [] No changes reported  Pt reports no current pain. She is looking forward to getting the splint today for stretching after her session. She wants to work on her walking some more. Her daughter requests we try the estim to get her leg muscles activated because it worked before.      OBJECTIVE    10 min Therapeutic Exercise:  [x] See flow sheet :   Rationale: increase ROM, increase strength, improve coordination, improve balance and increase proprioception to improve the patients ability to ambulate with improved independence    15 min Gait Training:  _20__ feet with _hemiwalker__ device on level surfaces with _CGA__ level of assist and w/c follow   Rationale: to improve ease of ambulation with decreased fall risk and LRAD          With   [] TE   [] TA   [] neuro   [] other: Patient Education: [x] Review HEP    [] Progressed/Changed HEP based on:   [] positioning   [] body mechanics   [] transfers   [] heat/ice application    [] other:      Other Objective/Functional Measures:   Utilized stretch strap for pt to advance left LE  Educated daughter we could trial estim but if no activation would continue with functional strengthening  Pt educated to use dynasplint starting for 30 minutes but to work up to 3 hours     Pain Level (0-10 scale) post treatment: 0/10    ASSESSMENT/Changes in Function: Pt progressing with independence with ambulation and will most benefit from use of dynasplint for improved AFO use. She has gastroc tightness from prolonged sitting position. She continues to have fatigue of the left UE for advancing the left LE using hemiwalker on the right. Will continue with general strengthening, trial of estim for quad activation and continuing with functional movements such as stairs, car transfers and ambulation. Progress towards goals / Updated goals:  1. Patient will improve FOTO score by 13 points in order to demonstrate a significant improvement in function. Progressing 11 point improvement (11/26/18)  2.  Patient will perform sit to supine and supine to sit transfers with SBA in order to increase ease of transfers at home.   3. Patient will ambulate 20 feet with hemiwalker and CGA in order to increase ease of ambulation at home. Met 20 feet with CGA; pt using left UE for left LE advancement with stretch it out strap (11/26/18)      PLAN  [x]  Upgrade activities as tolerated     [x]  Continue plan of care  []  Update interventions per flow sheet       []  Discharge due to:_  []  Other:_      Rodrigo Arnold PTA 12/18/2018  11:40 AM    Future Appointments   Date Time Provider Wolf Peters   12/18/2018 12:00 PM Jacob Aparicio PTA [facility] [address]   12/20/2018 10:30 AM Maribell Davis [id number] [address]   12/20/2018 11:30 AM Jacob Aparicio PTA [facility] [address]   12/26/2018 11:00 AM Vito Ruelas, OTR/L [facility] [address]   12/26/2018 12:00 PM Jacob Aparicio PTA [facility] [address]   12/28/2018 10:30 AM Vito Ruelas, OTR/L [facility] [address]   12/28/2018 11:30 AM Jacob Aparicio PTA [facility] [address]

## 2018-12-18 NOTE — PROGRESS NOTES
OT DAILY TREATMENT NOTE 10-18    Patient Name: Joan Merchant  Date:2018  : 1950  [x]  Patient  Verified  Payor: VA MEDICARE / Plan: VA MEDICARE PART A & B / Product Type: Medicare /    In YLOP:[id number]  Out time:1205  Total Treatment Time (min): 52  Visit #: 3 of     Medicare/BCBS Only   Total Timed Codes (min):  39 1:1 Treatment Time:  47     Treatment Area: Muscle weakness (generalized) [M62.81]    SUBJECTIVE  Pain Level (0-10 scale): 0/10  Any medication changes, allergies to medications, adverse drug reactions, diagnosis change, or new procedure performed?: [x] No    [] Yes (see summary sheet for update)  Subjective functional status/changes:   [] No changes reported  Can you please loosen my shoe laces?     OBJECTIVE    Modality rationale: decrease pain and increase tissue extensibility to improve the patients ability to reach   Type Additional Details   [] Estim:  []Unatt       []IFC  []Premod                        []Other:  []w/ice   []w/heat  Position:  Location:   [] Estim: []Att    []TENS instruct  []NMES                    []Other:  []w/US   []w/ice   []w/heat  Position:  Location:   []  Traction: [] Cervical       []Lumbar                       [] Prone          []Supine                       []Intermittent   []Continuous Lbs:  [] before manual  [] after manual   []  Ultrasound: []Continuous   [] Pulsed                           []1MHz   []3MHz W/cm2:  Location:   []  Iontophoresis with dexamethasone         Location: [] Take home patch   [] In clinic   []  Ice     [x]  Heat 8 min  []  Ice massage  []  Laser   []  Anodyne Position:  Location: Left Shoulder   []  Laser with stim  []  Other:  Position:  Location:   []  Vasopneumatic Device Pressure:       [] lo [] med [] hi   Temperature: [] lo [] med [] hi   [x] Skin assessment post-treatment:  [x]intact []redness- no adverse reaction    []redness  adverse reaction:     24 min Therapeutic Exercise:  [] See flow sheet :   Rationale: increase ROM and increase strength to improve the patients ability to , pinch    Medium soft theraputty: Pt used left hand to manipulate putty to reveal and remove 1/4 in pegs 10/10    PROM: Shoulder Flexion 10x    15 min Therapeutic Activity:  []  See flow sheet :   Rationale: increase ROM and improve coordination  to improve the patients ability to manipulate small items     Grooved Pegs: Left Hand, timed  Digit to palm translation for grooved peg removal   Attempted palm to digit translation with no success   Attempt to don LFO but unable secondary to reported pain       With   [] TE   [] TA   [] neuro   [] other: Patient Education: [x] Review HEP    [] Progressed/Changed HEP based on:   [] positioning   [] body mechanics   [] transfers   [] heat/ice application   [] Splint wear/care   [] Sensory re-education   [] scar management      [] other:            Other Objective/Functional Measures:     Grooved Pegs, Left: 8:07    Min drops with placement/removal      Pain Level (0-10 scale) post treatment: 0/10    ASSESSMENT/Changes in Function: Pain reduced in Left Shoulder with use of heat, Physical therapy notified of reported pain with attempt at donning LFO    Patient will continue to benefit from skilled OT services to modify and progress therapeutic interventions, address ROM deficits, address strength deficits and instruct in home and community integration to attain remaining goals. []  See Plan of Care  []  See progress note/recertification  []  See Discharge Summary         Progress towards goals / Updated goals:  1.  Patient will report improved left hand use as shown by reduced limitation on Neuro Quality of Life by at least 20% ( initially 66%).  Reduced 10% 11/16/18  2.  Patient will be able to perform accurate reach and grasp of 1 inch and smaller items with left hand to be able to grasp items in home without knocking over and increase safety. met 11/14/18  3.  Patient will be able to reach with left hand to place items in dresser, in fridge on counter etc. Met 11/16/18  4.  Patient will improve  and pinches in left hand by 10# and 2-3# to help with opening packages and containers.    +5, All Pinches +1 11/16/18, progressing with putty 12/18/18 HEP  5.  Patient will be independent in home program to improve left shoulder ROm and true forward flexion Progressing, Pt reports Max difficulty with scapular retraction/elevation 12/4/18, ongoing difficulty during FM task 12/18/18     **Added: New Goal**     1. 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  Pt able to touch top of head using Left UE, will attempt hair care using Dry Shampoo 11/26/18        PLAN  []  Upgrade activities as tolerated     [x]  Continue plan of care  []  Update interventions per flow sheet       []  Discharge due to:_  []  Other:_      TOMAS Robledo/L 12/18/2018  11:02 AM    Future Appointments   Date Time Provider Wolf Peters   12/18/2018 11:15 AM Yumi Menendez [id number]   12/18/2018 12:00 PM Shamika Guadalupe, PTA [facility]   12/20/2018 10:30 AM Yumi Menendez [facility]   12/20/2018 11:30 AM Shamika Guadalupe PTA [facility]   12/26/2018 11:00 AM Miryam Guardado, OTR/L [facility]   12/26/2018 12:00 PM Shamika Guadalupe PTA [facility]   12/28/2018 10:30 AM Miryam Guardado, OTR/L [facility]   12/28/2018 11:30 AM Shamika Guadalupe PTA [facility]

## 2018-12-20 ENCOUNTER — APPOINTMENT (OUTPATIENT)
Dept: PHYSICAL THERAPY | Age: 68
End: 2018-12-20
Payer: MEDICARE

## 2018-12-20 NOTE — PROGRESS NOTES
[facility]  [facility] [employer] [facility]  [address] [place] [address]  [phone] fax    Continued Plan of Care/ Re-certification for Occupational Therapy Services    Patient name: Dafne Pederson Start of Care: 10/1/18   Referral source: MD Dr. Alecia Cardenas : 1950   Medical/Treatment Diagnosis: Muscle weakness (generalized) [M62.81]  Payor: Miguelito Filler / Plan: [place] MEDICARE PART A & B / Product Type: Medicare /  Onset Date:2018     Prior Hospitalization: see medical history Provider#: [id number]   Medications: Verified on Patient Summary List    Comorbidities: HTN  Prior Level of Function:*[place], I self care home care driving, [place]**  Visits from Start of Care: 27    Missed Visits: 3    The Plan of Care and following information is based on the patient's current status:  Shira Burrell will report improved left hand use as shown by reduced limitation on Neuro Quality of Life by at least 20% ( initially 66%). Status at last note/certification:55%  Current Status: met now 45%     Josiah Manley will improve  and pinches in left hand by 10# and 2-3# to help with opening packages and containers  Status at last note/certification: increased 5#, pinches increased 1#  Current Status: not met Not retested missed visit   and poor   Goal:3.  Patient will be independent in home program to improve left shoulder ROm and true forward flexion   Status at last note/certification:Did not have  Current Status: Met for HEP    Goal:4.  Patient will report being independent with washing hair using Left UE by increasing Shoulder ROM  Status at last note/certification:Unable  Current Status: not met Progressing, able to reach front and side but not crown    Key functional changes: Improving hand function and reach but still with tendency to substitute motions when attempting shoulder flexion      Problems/ barriers to goal attainment: Ongoing tightness in shoulder and poor scapular humeral rhythm    Treatment Plan: Therapeutic exercise, Therapeutic activities, Patient education and ADLs/IADLs      Patient Goal (s) has been updated and includes: Be able to reach head      Goals for this certification period to be accomplished in 4 weeks:  *Loyda Guerin will improve  and pinches in left hand by 10# and 2-3# to help with opening packages and containers    Aimee Webb will be able to flex shoulder forward to 90 repeatedly to place items on counter from wheelchair level without pain due to improved scapulohumeral rhythm. Goal:3. Patient will report being independent with washing hair using Left UE by increasing Shoulder ROM    4. Finalize home program for ROm strength and fine motor skills    Frequency / Duration: Patient to be seen *2-3** times per week for 4 weeks:    Assessment / Recommendations:Pierce has improved significantly in UE control in space but continued to have poor scapulo- humeral rhythm which impedes use at shoulder level and above. She will benefit from continued skilled occupational therapy to improve ROM and normal movement patterns. G-Codes (GO)    Carry   Current  CK= 40-59%   [id number] Goal  CJ= 20-39%      The severity rating is based on clinical judgment and the FOTO score. Certification Period: 12/21/18-1/19/18    VESNA Avelar/L 12/20/2018 5:46 PM    ________________________________________________________________________  I certify that the above Therapy Services are being furnished while the patient is under my care. I agree with the treatment plan and certify that this therapy is necessary.       Physician's Signature:____________Date:_________TIME:________    ** Signature, Date and Time must be completed for valid certification **      Please sign and return to [facility]  [facility]  [address] [place] [address]            [phone] fax

## 2018-12-26 ENCOUNTER — HOSPITAL ENCOUNTER (OUTPATIENT)
Dept: PHYSICAL THERAPY | Age: 68
Discharge: HOME | End: 2018-12-26
Payer: MEDICARE

## 2018-12-26 PROCEDURE — 97530 THERAPEUTIC ACTIVITIES: CPT

## 2018-12-26 PROCEDURE — 97110 THERAPEUTIC EXERCISES: CPT

## 2018-12-26 PROCEDURE — G8978 MOBILITY CURRENT STATUS: HCPCS

## 2018-12-26 PROCEDURE — G8979 MOBILITY GOAL STATUS: HCPCS

## 2018-12-26 NOTE — PROGRESS NOTES
[facility]  [address] [facility]  [phone] fax    Medicare Progress Report    Patient name: Joan Merchant Start of Care:  10/1/18   Referral source: Carlos Montero MD : 1950   Medical/Treatment Diagnosis: Muscle weakness (generalized) [M62.81]  Payor: Miesha Delay / Plan: [place] MEDICARE PART A & B / Product Type: Medicare /  Onset Date: 2018     Prior Hospitalization: see medical history Provider#: [id number]   Medications: Verified on Patient Summary List    Comorbidities:  HTN  Prior Level of Function: Ind with ambulation, working, Ind with ADLs  Visits from Start of Care: 27    Missed Visits: 2    Reporting Period: 18 to 18    Subjective Reports: pt. Reports she is able to transfers from wheelchair to bed by herself. Current Status/ treatment goals Objective measures   1. Patient will improve FOTO score by 13 points in order to demonstrate a significant improvement in function       [] met                 [] not met  [x] progressing  last note: 34    Current: 45   2. Patient will perform sit to supine and supine to sit transfers with SBA in order to increase ease of transfers at home.        [] met                 [] not met  [] progressing Last note:  Min A    Current: supervision for supine to sit and sit to supine transfers   3. Patient will ambulate 20 feet with hemiwalker and CGA in order to increase ease of ambulation at home       [] met                 [] not met  [x] progressing Last note: min A with hemiwalker    Current: is able to ambulate 20 feet with hemiwalker and strap to bring left LE fwd          Key functional changes: pt. Has improved independence with transfers. Problems/ barriers to goal attainment: none     Assessment / Recommendations: pt. Is progressing with physical therapy. She has limited return of left LE strength but does demonstrate improving independence with transfers. She has also been using a emelina splint at home to improve left DF mobility for increased ease of ambulation. She was able to perform supine to sit and sit to supine transfers with supervision assistance and wheelchair to table transfers with SBA. She continues to require assistance to bring left LE fwd during ambulation with hemiwalker. Her home is too narrow for a wheelchair so will continue to work on independent ambulation with hemiwalker in order to improve independence at home. Skilled PT is medically necessary in order to improve left LE mobility and strength for increased ease of ambulation for improved independence at home. Problem List: pain affecting function, decrease ROM, decrease strength, impaired gait/ balance, decrease ADL/ functional abilitiies, decrease activity tolerance, decrease flexibility/ joint mobility and decrease transfer abilities   Treatment Plan: Therapeutic exercise, Therapeutic activities, Neuromuscular re-education, Physical agent/modality, Gait/balance training, Manual therapy, Patient education, Self Care training, Functional mobility training, Home safety training and Stair training    Patient Goal (s) has been updated and includes: to walk better     Updated Goals to be accomplished in 21 treatments:  1. Patient will improve FOTO score by 13 points in order to demonstrate a significant improvement in function. 2. Patient will ambulation 50 feet with hemiwalker and SBA in order to increase ease of ambulation at home. 3. Patient will be able to ambulate around 10 cones with hemiwalker and SBA in order to increase ease of ambulation around home. Frequency / Duration: Patient to be seen 2-3 times per week for 21 treatments    G-Codes (GP)  Mobility  [id number] Current  CK= 40-59%   Goal  CK= 40-59%    The severity rating is based on clinical judgment and the FOTO score.       Karmen Chi, PT 12/26/2018 4:20 PM

## 2018-12-26 NOTE — PROGRESS NOTES
PT DAILY TREATMENT NOTE 10-18    Patient Name: Antonio Carreno  Date:2018  : 1950  [x]  Patient  Verified  Payor: [place] MEDICARE / Plan: [place] MEDICARE PART A & B / Product Type: Medicare /    In time:12:00 Out time:12:43  Total Treatment Time (min): 43  Visit #: 3 of -36    Medicare/BCBS Only   Total Timed Codes (min):  43 1:1 Treatment Time:  43       Treatment Area: Muscle weakness (generalized) [M62.81]    SUBJECTIVE  Pain Level (0-10 scale): 0/10  Any medication changes, allergies to medications, adverse drug reactions, diagnosis change, or new procedure performed?: [x] No    [] Yes (see summary sheet for update)  Subjective functional status/changes:   [] No changes reported  Pt reports no current pain. She states she can transfer from her w/c to bed by herself. She can also lay down and sit up by herself. The house is too narrow to make it around with the w/c so her only option would be to be more mobile with the hemiwalker around the house. Pt states she doesn't want to do estim. She has had her dynasplint for about a week and is wearing it 1.5 hours a day.     OBJECTIVE      33 min Therapeutic Exercise:  [x] See flow sheet :   Rationale: increase ROM and increase strength to improve the patients ability to improve ease of ambulation and transfers    10 min Therapeutic Activity:  [x]  See flow sheet : sit<>supine transfers; w/c<>table transfers; sit to stand   Rationale: increase ROM, increase strength, improve coordination, improve balance and increase proprioception  to improve the patients ability to improve ease of ambulation and transfers           With   [] TE   [] TA   [] neuro   [] other: Patient Education: [x] Review HEP    [] Progressed/Changed HEP based on:   [] positioning   [] body mechanics   [] transfers   [] heat/ice application    [] other:      Other Objective/Functional Measures:   Supervision with supine<>sit  Some challenge laying to the left due to left shoulder pain  Slight left knee pain/crepitus trying to perform LAQ  Knee pain with sit to stand transfers on the left  Educated/discussed goals for therapy moving forward to be on walking/weaving between areas to simulate for home use and independence     Pain Level (0-10 scale) post treatment: 0/10    ASSESSMENT/Changes in Function: Pt is making good progress towards goals in therapy. She is able to perform sit<>supine transfers with supervision and w/c<>table transfers with SBA. She has limited return of strength in the left LE. Will benefit from continuing to work on functional ambulating in order to navigate her household with improved independence. Progress towards goals / Updated goals:  1. Patient will improve FOTO score by 13 points in order to demonstrate a significant improvement in function. Progressing 11 point improvement (11/26/18)  2. Patient will perform sit to supine and supine to sit transfers with SBA in order to increase ease of transfers at home. Met;  (12/26/18)  3.  Patient will ambulate 20 feet with hemiwalker and CGA in order to increase ease of ambulation at home. Met 20 feet with CGA; pt using left UE for left LE advancement with stretch it out strap (11/26/18)    PLAN  [x]  Upgrade activities as tolerated     [x]  Continue plan of care  []  Update interventions per flow sheet       []  Discharge due to:_  []  Other:_      Morton Mortimer, PTA 12/26/2018  8:51 AM    Future Appointments   Date Time Provider Wolf Peters   12/26/2018 11:00 AM Ana M Ocampo OTR/L [facility]   12/26/2018 12:00 PM Yrn Jackson PTA [facility]   12/28/2018 10:30 AM Ana M Ocampo OTR/L [facility]   12/28/2018 11:30 AM Yrn Jackson PTA [facility]

## 2018-12-26 NOTE — PROGRESS NOTES
OT DAILY TREATMENT NOTE 10-18    Patient Name: Carmina Veronicaing  Date:2018  : 1950  [x]  Patient  Verified  Payor: [place] MEDICARE / Plan: [place] MEDICARE PART A & B / Product Type: Medicare /    In time:1105  Out time:1150  Total Treatment Time (min): 45  Visit #: 1 of     Medicare/BCBS Only   Total Timed Codes (min):  45 1:1 Treatment Time:  45     Treatment Area: Muscle weakness (generalized) [M62.81]    SUBJECTIVE  Pain Level (0-10 scale): 0/10  Any medication changes, allergies to medications, adverse drug reactions, diagnosis change, or new procedure performed?: [x] No    [] Yes (see summary sheet for update)  Subjective functional status/changes:   [] No changes reported  *Still have trouble getting out of the bed myself**  Wheelchair won't move thru house well too tight    OBJECTIVE      45 min Therapeutic Exercise:  [] See flow sheet :   Rationale: increase ROM and increase strength to improve the patients ability to reach  Soft putty and L tool left hand x 10 pegs  Dowel roll up wedge with both hands-right took over, then done with OT as right hand x 5  Dowle climb hand over hand x 5 from table level to increase shoulder flexion  Dowel hand over hand hor ab add with difficulty keeping dowel off table when holding with just left arm x 5  Hand helper x 18 1 in cubes with 6 bands right hand         With   [] TE   [] TA   [] neuro   [] other: Patient Education: [x] Review HEP    [] Progressed/Changed HEP based on: use beand in putty, try dowel ex at home ( climb)  [] positioning   [] body mechanics   [] transfers   [] heat/ice application   [] Splint wear/care   [] Sensory re-education   [] scar management      [] other:            Other Objective/Functional Measures:   Able to climb up dowel with right hand to approximately 100 degrees flexion     Pain Level (0-10 scale) post treatment: 0/10    ASSESSMENT/Changes in Function: Still with tendency to flex trunk vs flex shoulder    Patient will continue to benefit from skilled OT services to modify and progress therapeutic interventions, address ROM deficits, address strength deficits, analyze and address soft tissue restrictions, analyze and cue movement patterns and instruct in home and community integration to attain remaining goals. []  See Plan of Care  []  See progress note/recertification  []  See Discharge Summary         Progress towards goals / Updated goals:  **Tabitha Mauricio will improve  and pinches in left hand by 10# and 2-3# to help with opening packages and containers     Malena Feliz will be able to flex shoulder forward to 90 repeatedly to place items on counter from wheelchair level without pain due to improved scapulohumeral rhythm.     Goal:3. Patient will report being independent with washing hair using Left UE by increasing Shoulder ROM     4.   Finalize home program for ROm strength and fine motor skillsadded dowel climb 12/26/18    **    PLAN  []  Upgrade activities as tolerated     []  Continue plan of care  []  Update interventions per flow sheet       []  Discharge due to:_  []  Other:_      VESNA Gaviria/L 12/26/2018  11:29 AM    Future Appointments   Date Time Provider Wolf Peters   12/26/2018 12:00 PM Stan Argueta PTA [facility]   12/28/2018 10:30 AM VESNA Leo Se/LORENA [facility]   12/28/2018 11:30 AM Satn Argueta PTA [facility]

## 2018-12-28 ENCOUNTER — HOSPITAL ENCOUNTER (OUTPATIENT)
Dept: PHYSICAL THERAPY | Age: 68
Discharge: HOME | End: 2018-12-28
Payer: MEDICARE

## 2018-12-28 PROCEDURE — 97110 THERAPEUTIC EXERCISES: CPT

## 2018-12-28 PROCEDURE — 97535 SELF CARE MNGMENT TRAINING: CPT

## 2018-12-28 PROCEDURE — 97116 GAIT TRAINING THERAPY: CPT

## 2018-12-28 NOTE — PROGRESS NOTES
OT DAILY TREATMENT NOTE 10-18 Patient Name: Imelda Padilla Date:2018 : 1950 [x]  Patient  Verified Payor: [place] MEDICARE / Plan: Armen Candelario y / Product Type: Medicare / In time:1030  Out time:1125 Total Treatment Time (min): 55 Visit #: 3 of  Medicare/BCBS Only Total Timed Codes (min):  45 1:1 Treatment Time:  55 Treatment Area: Muscle weakness (generalized) [M62.81] SUBJECTIVE Pain Level (0-10 scale): 0/10 Any medication changes, allergies to medications, adverse drug reactions, diagnosis change, or new procedure performed?: [x] No    [] Yes (see summary sheet for update) Subjective functional status/changes:   [] No changes reported Think I have to see my doctor again OBJECTIVE Modality rationale: decrease pain to improve the patients ability to reach Type Additional Details  
[] Estim:  []Unatt       []IFC  []Premod []Other:  []w/ice   []w/heat Position: Location:  
[] Estim: []Att    []TENS instruct  []NMES []Other:  []w/US   []w/ice   []w/heat Position: Location:  
[]  Traction: [] Cervical       []Lumbar 
                     [] Prone          []Supine []Intermittent   []Continuous Lbs: 
[] before manual 
[] after manual  
[]  Ultrasound: []Continuous   [] Pulsed []1MHz   []3MHz W/cm2: 
Location:  
[]  Iontophoresis with dexamethasone Location: [] Take home patch  
[] In clinic  
[]  Ice     [x]  heat10 mins []  Ice massage 
[]  Laser  
[]  Anodyne Position: Location: left shoulder  
[]  Laser with stim 
[]  Other:  Position: Location:  
[]  Vasopneumatic Device Pressure:       [] lo [] med [] hi  
Temperature: [] lo [] med [] hi  
[x] Skin assessment post-treatment:  [x]intact []redness- no adverse reaction 
  []redness  adverse reaction:  
 
35 min Therapeutic Exercise:  [] See flow sheet :  
 Rationale: increase strength to improve the patients ability to grasp Soft putty with tools to work on motor control and strength of left hand Retrieved 10 1 in pegs from pegboard on incline wedge at row 9, unable to do row 10 
 
  
10 mins Self care Retiieved clothespins from hat on left with left hand to practice shampooing skills With 
 [] TE 
 [] TA 
 [] neuro 
 [] other: Patient Education: [x] Review HEP [] Progressed/Changed HEP based on: move head as needed 
[] positioning   [] body mechanics   [] transfers   [] heat/ice application  
[] Splint wear/care   [] Sensory re-education   [] scar management     
[] other:   
 
     
Other Objective/Functional Measures:  
Able to reach with left hand well for shampoo simulation Pain Level (0-10 scale) post treatment: 0/10 ASSESSMENT/Changes in Function:  
 
Patient will continue to benefit from skilled OT services to modify and progress therapeutic interventions, address ROM deficits, address strength deficits, analyze and address soft tissue restrictions, analyze and cue movement patterns and instruct in home and community integration to attain remaining goals. []  See Plan of Care 
[]  See progress note/recertification 
[]  See Discharge Summary Progress towards goals / Updated goals: **Rachel Keita will improve  and pinches in left hand by 10# and 2-3# to help with opening packages and containers 
  
Reji Mt will be able to flex shoulder forward to 90 repeatedly to place items on counter from wheelchair level without pain due to improved scapulohumeral rhythm. rquires cueing to flex at shoulder vs lean forward 12/28/18 
  
Goal:3. Patient will report being independent with washing hair using Left UE by increasing Shoulder ROMprogressing, able to retrieve clothespins from right side and back of head with left hand 12/28/18 
  
4.  Finalize home program for ROm strength and fine motor skillsadded dowel climb 12/26/18 PLAN 
[]  Upgrade activities as tolerated     []  Continue plan of care 
[]  Update interventions per flow sheet      
[]  Discharge due to:_ 
[]  Other:_ VESNA Powers/L 12/28/2018  11:17 AM 
 
Future Appointments Date Time Provider Wolf Peters 12/28/2018 11:30 AM Julia Blanc PT [facility]

## 2018-12-28 NOTE — PROGRESS NOTES
PT DAILY TREATMENT NOTE 10-18 Patient Name: Abilio Devine Date:2018 : 1950 [x]  Patient  Verified Payor: [place] MEDICARE / Plan: Armen Lei / Product Type: Medicare / In time: 11:30  Out time: 12:00 Total Treatment Time (min): 30 Visit #: 4 of -36 Medicare/BCBS Only Total Timed Codes (min):  30 1:1 Treatment Time:  30 Treatment Area: Muscle weakness (generalized) [M62.81] SUBJECTIVE Pain Level (0-10 scale): 5/10 Any medication changes, allergies to medications, adverse drug reactions, diagnosis change, or new procedure performed?: [x] No    [] Yes (see summary sheet for update) Subjective functional status/changes:   [] No changes reported Pt. Reports she she is feeling about the same today. OBJECTIVE 20 min Therapeutic Exercise:  [x] See flow sheet :  
Rationale: increase ROM and increase strength to improve the patients ability to increase ease of ambulation 10 min Gait Training:  _20x2__ feet with hemiwalker___ device on level surfaces with _CGA_to min A_ level of assist  
Rationale: to increase ease of ambulation at home With 
 [x] TE 
 [] TA 
 [] neuro 
 [] other: Patient Education: [x] Review HEP [] Progressed/Changed HEP based on:  
[] positioning   [] body mechanics   [] transfers   [] heat/ice application   
[] other:   
 
Other Objective/Functional Measures:  
Pt. Continues to require min A to bring left leg fwd during ambulation but is able to perform unassisted at times Attempted to ambulate with RW as well but did not affect her ability to  left LE 
Pt. Had complaints of knee pain in left knee during therex but none at rest  
 
Pain Level (0-10 scale) post treatment: 0/10 ASSESSMENT/Changes in Function:  Pt. Is making limited progress towards goals. She continues to have significant decrease in left LE strength which is affecting her ambulation. She continues to require assistance to bring left leg fwd. Patient will continue to benefit from skilled PT services to modify and progress therapeutic interventions, address functional mobility deficits, address ROM deficits, address strength deficits, analyze and address soft tissue restrictions, analyze and cue movement patterns, analyze and modify body mechanics/ergonomics and assess and modify postural abnormalities to attain remaining goals. Progress towards goals / Updated goals: 1. Patient will improve FOTO score by 13 points in order to demonstrate a significant improvement in function. 2. Patient will ambulation 50 feet with hemiwalker and SBA in order to increase ease of ambulation at home. 3. Patient will be able to ambulate around 10 cones with hemiwalker and SBA in order to increase ease of ambulation around home. PLAN 
[]  Upgrade activities as tolerated     [x]  Continue plan of care 
[]  Update interventions per flow sheet      
[]  Discharge due to:_ 
[]  Other:_ Matt Melendrez, PT 12/28/2018  7:51 AM 
 
Future Appointments Date Time Provider Wolf Peters 12/28/2018 10:30 AM Lesa Kulkarni OTR/LORENA [facility]  
12/28/2018 11:30 AM Vignesh Bentley PT [facility]

## 2019-01-28 ENCOUNTER — HOSPITAL ENCOUNTER (OUTPATIENT)
Dept: PHYSICAL THERAPY | Age: 69
Discharge: HOME | End: 2019-01-28
Payer: MEDICARE

## 2019-01-28 PROCEDURE — 97168 OT RE-EVAL EST PLAN CARE: CPT

## 2019-01-28 PROCEDURE — 97530 THERAPEUTIC ACTIVITIES: CPT

## 2019-01-28 PROCEDURE — 97164 PT RE-EVAL EST PLAN CARE: CPT

## 2019-01-28 PROCEDURE — 97116 GAIT TRAINING THERAPY: CPT

## 2019-01-28 PROCEDURE — G8978 MOBILITY CURRENT STATUS: HCPCS

## 2019-01-28 PROCEDURE — G8979 MOBILITY GOAL STATUS: HCPCS

## 2019-01-28 PROCEDURE — 97110 THERAPEUTIC EXERCISES: CPT

## 2019-01-28 NOTE — PROGRESS NOTES
[facility] [address] [facility] 
[phone] fax Continued Plan of Care/ Re-certification for Physical Therapy Services Patient name: Mike Bailey Start of Care:  10/1/18 Referral source: Irwin Tejada MD : 1950 Medical/Treatment Diagnosis: Muscle weakness (generalized) [M62.81] Payor: Acacia [employer] / Plan: [place] MEDICARE PART A & B / Product Type: Medicare /  Onset Date: 2018 
   
Prior Hospitalization: see medical history Provider#: [id number] Medications: Verified on Patient Summary List    
Comorbidities:  HTN Prior Level of Function: Ind with ambulation, working, Ind with ADLs Visits from Start of Care: 28    Missed Visits: 2 The Plan of Care and following information is based on the patient's current status: 
Goal: Patient will improve FOTO score by 13 points in order to demonstrate a significant improvement in function. Status at last note/certification: Not met, 40 points total (increase in 6 points) Current Status: not met Goal: Patient will ambulation 50 feet with hemiwalker and SBA in order to increase ease of ambulation at home. Status at last note/certification: not met, bouts of 19 feet, 20 feet, 18 feet, 8 feet, 18 feet and 18 feet, with hemiwalker device on level surfaces with CGA level of assist for stability, SBA to max assist for advancing the left LE during swing phase of gait with belt; sitting rest break taken in between bouts of ambulation Current Status: not met Goal: Patient will be able to ambulate around 10 cones with hemiwalker and SBA in order to increase ease of ambulation around home.  
Status at last note/certification: not met, bouts of 19 feet, 20 feet, 18 feet, 8 feet, 18 feet and 18 feet, with hemiwalker device on level surfaces with CGA level of assist for stability, SBA to max assist for advancing the left LE during swing phase of gait with belt; sitting rest break taken in between bouts of ambulation Current Status: not met Key functional changes:  
Pt continues to need rest beaks during short distances during ambulation and has difficulty advancing the left LE independently during swing phase of gait. Problems/ barriers to goal attainment: none Problem List: pain affecting function, decrease ROM, decrease strength, impaired gait/ balance, decrease ADL/ functional abilitiies, decrease activity tolerance, decrease flexibility/ joint mobility and decrease transfer abilities Treatment Plan: Therapeutic exercise, Therapeutic activities, Neuromuscular re-education, Physical agent/modality, Gait/balance training, Manual therapy, Patient education, Self Care training, Functional mobility training, Home safety training and Stair training Patient Goal (s) has been updated and includes: to walk better and more strength Goals for this certification period to be accomplished in 10 treatments: 1. Patient will improve FOTO score by 13 points in order to demonstrate a significant improvement in function. 2. Patient will ambulation 50 feet with hemiwalker and SBA with no rest breaks in order to increase ease of ambulation at home. 3. Patient will be able to ambulate around 10 cones with hemiwalker and SBA in order to increase ease of ambulation around home. Frequency / Duration: Patient to be seen 2 times per week for 10 treatments: 
Assessment / Recommendations: 
Pt is being re-evaluated today secondary today being the pt's first follow up therapy session since 12/28/2018 because of scheduling issues. Pt reports no changes since the last time she was in therapy. Pt also reports having no falls since the last therapy session. Pt continues to have difficulty with advancing the left LE during gait but is able to initiate this movement independently but needing assist to advance the left LE.  We will plan on continuing therapy at this time to improve safety/ease of gait and mobility at home and in the community. G-Codes (GP) Mobility  Current  CL= 60-79%   Goal  CK= 40-59% Certification Period: 1/28/2019 - 2/26/2019 Oral Cos, PT 1/28/2019 1:50 PM 
 
________________________________________________________________________ I certify that the above Therapy Services are being furnished while the patient is under my care. I agree with the treatment plan and certify that this therapy is necessary. [] I have read the above and request that my patient continue as recommended. [] I have read the above report and request that my patient continue therapy with the following changes/special instructions: _______________________________________ [] I have read the above report and request that my patient be discharged from therapy [de-identified] Signature:____________Date:_________TIME:________ 
 
[employer], Date and Time must be completed for valid certification ** Please sign and return to [facility] [address] [facility] 
[phone] fax

## 2019-01-28 NOTE — PROGRESS NOTES
PT DAILY TREATMENT NOTE 10-18 Patient Name: Pierce Torrez Date:2019 : 1950 [x]  Patient  Verified Payor: [place] MEDICARE / Plan: Armen Hellernils / Product Type: Medicare / In time: 1:01  Out time: 1:43 Total Treatment Time (min): 42 Visit #: 5 of 24-36 Medicare/BCBS Only Total Timed Codes (min):  42 1:1 Treatment Time:  42 Treatment Area: Muscle weakness (generalized) [M62.81] SUBJECTIVE Pain Level (0-10 scale): 0 Any medication changes, allergies to medications, adverse drug reactions, diagnosis change, or new procedure performed?: [x] No    [] Yes (see summary sheet for update) Subjective functional status/changes:   [] No changes reported Reports no changes since the last therapy session. Today is the pt's first therapy session since 2018 secondary to scheduling issues. OBJECTIVE 10 min []Eval                  [x]Re-Eval  
 
32 min Gait Training: Bouts of 19 feet, 20 feet, 18 feet, 8 feet, 18 feet and 18 feet, with hemiwalker device on level surfaces with CGA level of assist for stability, SBA to max assist for advancing the left LE during swing phase of gait with belt; sitting rest break taken in between bouts of ambulation. Rationale: to increase ease and safety of ambulation at home With 
 [] TE 
 [] TA 
 [] neuro 
 [] other: Patient Education: [x] Review HEP [] Progressed/Changed HEP based on:  
[] positioning   [] body mechanics   [] transfers   [] heat/ice application   
[] other:   
 
Other Objective/Functional Measures: See goals below. Pain Level (0-10 scale) post treatment: 0  
 
ASSESSMENT/Changes in Function: See re-certification/re-evaluation. Pt is being re-evaluated today secondary today being the pt's first follow up therapy session since 2018 because of scheduling issues. Pt reports no changes since the last time she was in therapy.  Pt also reports having no falls since the last therapy session. Pt continues to have difficulty with advancing the left LE during gait but is able to initiate this movement independently but needing assist to advance the left LE. We will plan on continuing therapy at this time to improve safety/ease of gait and mobility at home and in the community. Patient will continue to benefit from skilled PT services to modify and progress therapeutic interventions, address functional mobility deficits, address ROM deficits, address strength deficits, analyze and address soft tissue restrictions, analyze and cue movement patterns, analyze and modify body mechanics/ergonomics and assess and modify postural abnormalities to attain remaining goals. Progress towards goals / Updated goals: 1. Patient will improve FOTO score by 13 points in order to demonstrate a significant improvement in function. Not met, 40 points total (increase in 6 points) 1/29/2019 2. Patient will ambulation 50 feet with hemiwalker and SBA in order to increase ease of ambulation at home. Not met, bouts of 19 feet, 20 feet, 18 feet, 8 feet, 18 feet and 18 feet, with hemiwalker device on level surfaces with CGA level of assist for stability, SBA to max assist for advancing the left LE during swing phase of gait with belt; sitting rest break taken in between bouts of ambulation 1/29/2019 3. Patient will be able to ambulate around 10 cones with hemiwalker and SBA in order to increase ease of ambulation around home. not met, bouts of 19 feet, 20 feet, 18 feet, 8 feet, 18 feet and 18 feet, with hemiwalker device on level surfaces with CGA level of assist for stability, SBA to max assist for advancing the left LE during swing phase of gait with belt; sitting rest break taken in between bouts of ambulation 1/29/2019 PLAN [x]  Upgrade activities as tolerated     [x]  Continue plan of care [x]  Update interventions per flow sheet      
[]  Discharge due to:_ 
[]  Other:_   
 Adrianna Davila, PT 1/28/2019  1:49 PM

## 2019-01-28 NOTE — PROGRESS NOTES
OT DAILY TREATMENT NOTE 10-18 Patient Name: Alicia Song Date:2019 : 1950 [x]  Patient  Verified Payor: VA MEDICARE / Plan: Armen Candelario [facility] / Product Type: Medicare / In time:145  Out time:230 Total Treatment Time (min): 45 Visit #: 1 of 24 Medicare/BCBS Only Total Timed Codes (min):  *30** 1:1 Treatment Time:  15  
 
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  
 
 
20 min Therapeutic Exercise:  [] See flow sheet :  
Rationale: increase ROM and increase strength to improve the patients ability to grasp Recheck  pinch shoulder ROM Towel slides x 10 bilateral flexion hor abduciton, left shoulder abd, left ER 
 
10 min Therapeutic Activity:  []  See flow sheet :  
Rationale: improve coordination  to improve the patients ability to manipulate small items 1/2 inch beads using left to place x 10 With 
 [x] TE 
 [] TA 
 [] neuro 
 [] other: Patient Education: [x] Review HEP [] Progressed/Changed HEP based on: review goals [] positioning   [] body mechanics   [] transfers   [] heat/ice application  
[] Splint wear/care   [] Sensory re-education   [] scar management     
[] other:   
 
     
Other Objective/Functional Measures:  
 
Hand Strength: Gross Grasp 3pt Pinch Lateral Pinch Tip Pinch Right Left 8 (10) 7 (5) 6(7) 5 (4)  
  
9 hole 38 (60) Shoulder flex  78 Abd   95 ER   20 IR   20 Pain Level (0-10 scale) post treatment: 0/10 ASSESSMENT/Changes in Function: Ongoing limited shoulder flexion and abduction, fine motor skills improving Patient will continue to benefit from skilled OT services to modify and progress therapeutic interventions, address ROM deficits, address strength deficits, analyze and cue movement patterns and instruct in home and community integration to attain remaining goals. []  See Plan of Care 
[]  See progress note/recertification 
[]  See Discharge Summary Progress towards goals / Updated goals: Lorna Raymundo will improve  and pinches in left hand by 10# and 2-3# to help with opening packages and containersnot met 1/28/19 
  
Lorrainevinita Olivera will be able to flex shoulder forward to 90 repeatedly to place items on counter from wheelchair level without pain due to improved scapulohumeral rhythm. not met 1/28/19 
  
Goal:3. 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 
  
4. Finalize home program for ROm strength and fine motor skillsreviewed towel slides for left UE 1/28/19 ** PLAN 
[]  Upgrade activities as tolerated     []  Continue plan of care 
[]  Update interventions per flow sheet      
[]  Discharge due to:_ 
[]  Other:_ VESNA Macario/L 1/28/2019  1:45 PM 
 
Future Appointments Date Time Provider Wolf Peters 2/6/2019  9:30 AM VESNA Gomez/LORENA MMCPTP[facility]  
2/6/2019 10:30 AM Mindy Metcalf PT [facility]  
2/8/2019 10:30 AM Savage Sparks CRDGMMM [facility]  
2/8/2019 11:30 AM Mindy Metcalf PT [facility]  
2/11/2019 10:15 AM Savage Sparks VGHTYGL [facility]  
2/11/2019 11:00 AM Mindy Metcalf PT [facility]  
2/13/2019 10:00 AM Mindy Metcalf PT [facility]  
2/13/2019 11:00 AM Savage Sparks QSFYYRY [facility]  
2/18/2019 10:30 AM Kath Duran PTA [facility]  
2/18/2019 11:00 AM Kirstin Sheffield OTR/LORENA [facility]  
2/20/2019 10:30 AM Kath Duran PTA [facility]  
 2019 11:00 AM Amarjit Aaron, OTR/L [facility]  
2019  9:30 AM Bindu García, PTA [facility]  
2019 10:15 AM Amarjit Aaron, OTR/L [facility]  
2019 10:15 AM Amarjit Aaron, OTR/L [facility]  
2019 11:00 AM Case Velazco, PT [facility]

## 2019-01-28 NOTE — PROGRESS NOTES
[facility] [address] [facility] 
[phone] fax Continued Plan of Care/ Re-certification for Occupational Therapy Services Patient name: Imelda Padilla Start of Care: 10/1/18 Referral source: MD Dr. Milly Del Rio : 1950 Medical/Treatment Diagnosis: Muscle weakness (generalized) [M62.81] Payor: [employer] / Plan: [place] MEDICARE PART A & B / Product Type: Medicare /  Onset Date:2018 Prior Hospitalization: see medical history Provider#: [id number] Medications: Verified on Patient Summary List   
Comorbidities: HTN Prior Level of Function:*Stihl, I self care home care driving, Cheondoism** Visits from Start of Care: *30    Missed Visits: 3 Patient has not been seen since 18 due to scheduling problems The Plan of Care and following information is based on the patient's current status: 
 
Regi Javier will improve  and pinches in left hand by 10# and 2-3# to help with opening packages and containers Status at last note/certification:See chart with prior  and pinches Current Status: not met Current strength listed below with prior in ( ) Hand Strength: Gross Grasp 3pt Pinch Lateral Pinch Tip Pinch Right           
Left 8 (10) 7 (5) 6(7) 5 (4) Regi Javier will be able to flex shoulder forward to 90 repeatedly to place items on counter from wheelchair level without pain due to improved scapulohumeral rhythm. Status at last note/certification:Limited Poor shoulder flexion with hiking present Current Status: not met See ROM below: 
 
Shoulder flex             78 
Abd                             95 
ER                               20 
IR                                 20   
 
 
Goal:3. 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 Finalize home program for ROm strength and fine motor skills Status at last note/certification:Needed update Current Status: not met 
9 hole peg 38 ( was 61) Key functional changes: Improving fine motor skills Problems/ barriers to goal attainment: Missed visits due to scheduling issues Treatment Plan: Therapeutic exercise, Therapeutic activities, Neuromuscular re-education, Physical agent/modality, Manual therapy, Patient education and ADLs/IADLs Patient Goal (s) has been updated and includes: Get stronger in hand Goals for this certification period to be accomplished in 4 weeks: *Patricia Ortiz will improve  and pinches in left hand by 10# and 2-3# to help with opening packages and containers 
  
Patrick Richardson will be able to flex shoulder forward to 90 repeatedly to place items on counter from wheelchair level without pain due to improved scapulohumeral rhythm. 
  
Goal:3. 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  
 
4.  Update home program for ROm strength and fine motor skills Frequency / Duration: Patient to be seen 2-3 times per week for 8 weeks: 
 
Assessment / Recommendations:Patient will benefit from continued skilled occupational therapy to increase upper extremity function and functional independence. Certification Period: 1/28/19-3/28/19 VESNA Diaz/L 1/28/2019 5:11 PM 
 
________________________________________________________________________ I certify that the above Therapy Services are being furnished while the patient is under my care. I agree with the treatment plan and certify that this therapy is necessary. [de-identified] Signature:____________Date:_________TIME:________ 
 
[employer], Date and Time must be completed for valid certification ** Please sign and return to [facility]  [address] [facility] [phone] fax

## 2019-01-30 ENCOUNTER — APPOINTMENT (OUTPATIENT)
Dept: PHYSICAL THERAPY | Age: 69
End: 2019-01-30
Payer: MEDICARE

## 2019-02-04 ENCOUNTER — APPOINTMENT (OUTPATIENT)
Dept: PHYSICAL THERAPY | Age: 69
End: 2019-02-04
Payer: MEDICARE

## 2019-02-06 ENCOUNTER — HOSPITAL ENCOUNTER (OUTPATIENT)
Dept: PHYSICAL THERAPY | Age: 69
Discharge: HOME | End: 2019-02-06
Payer: MEDICARE

## 2019-02-06 PROCEDURE — 97116 GAIT TRAINING THERAPY: CPT

## 2019-02-06 PROCEDURE — 97110 THERAPEUTIC EXERCISES: CPT

## 2019-02-06 PROCEDURE — 97530 THERAPEUTIC ACTIVITIES: CPT

## 2019-02-06 NOTE — PROGRESS NOTES
OT DAILY TREATMENT NOTE 10-18 Patient Name: Don Harris Date:2019 : 1950 [x]  Patient  Verified Payor: [place] MEDICARE / Plan: Armen Candelario [facility] / Product Type: Medicare / In time:930  Out time:1015 Total Treatment Time (min): 45 Visit #: 2 of - Medicare/BCBS Only Total Timed Codes (min):  45 1:1 Treatment Time:  45  
 
Treatment Area: Muscle weakness (generalized) [M62.81] SUBJECTIVE Pain Level (0-10 scale): 0/10 Any medication changes, allergies to medications, adverse drug reactions, diagnosis change, or new procedure performed?: [x] No    [] Yes (see summary sheet for update) Subjective functional status/changes:   [] No changes reported My hand is sore after that ( therabar) OBJECTIVE 45 min Therapeutic Exercise:  [] See flow sheet :  
Rationale: increase strength to improve the patients ability to grasp Yellow therabar pronation x 15 Red therabar [id number] Attempted wrist flex ext with yellow with report of pain-discontinued Soft putty and pegs with tool left hand x 10 Graded clothespins left hand 3 point pinch x 10 6# and 8# With 
 [] TE 
 [] TA 
 [] neuro 
 [] other: Patient Education: [x] Review HEP [] Progressed/Changed HEP based on:  
[] positioning   [] body mechanics   [] transfers   [] heat/ice application  
[] Splint wear/care   [] Sensory re-education   [] scar management     
[] other:   
 
     
Other Objective/Functional Measures:  
Pain with wrist flexion ext against resistance Pain Level (0-10 scale) post treatment: 0/10 ASSESSMENT/Changes in Function: improving strength Patient will continue to benefit from skilled OT services to modify and progress therapeutic interventions, address strength deficits, analyze and cue movement patterns and instruct in home and community integration to attain remaining goals. []  See Plan of Care 
[]  See progress note/recertification 
[]  See Discharge Summary Progress towards goals / Updated goals: Gerhard Casey will improve  and pinches in left hand by 10# and 2-3# to help with opening packages and containersprogressing with therabar and clothespins 2/6/19 
  
Maura Skigissell will be able to flex shoulder forward to 90 repeatedly to place items on counter from wheelchair level without pain due to improved scapulohumeral rhythm. 
  
Goal:3. 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  
  
4.  Update home program for ROm strength and fine motor skills PLAN [x]  Upgrade activities as tolerated     [x]  Continue plan of care 
[]  Update interventions per flow sheet      
[]  Discharge due to:_ 
[]  Other:_ VESNA Bella/L 2/6/2019  9:40 AM 
 
Future Appointments Date Time Provider Wolf Peters 2/6/2019 10:30 AM Dewain Rad, PT [facility]  
2/8/2019 10:30 AM Kamlesh Carlos [id number] [facility]  
2/8/2019 11:30 AM Dewain Rad, PT [facility]  
2/11/2019 10:15 AM Kamleshjose Carlos [id number] [facility]  
2/11/2019 11:00 AM Dewain Rad, PT [facility]  
2/13/2019 10:00 AM Dewain Rad, PT [facility]  
2/13/2019 11:00 AM Kamlesh Breanna XZEKKIV [facility]  
2/18/2019 10:30 AM Dewain Rad, PT [facility]  
2/18/2019 11:00 AM Tanya Townsend OTR/L [facility]  
2/20/2019 10:30 AM Kristan Romie, PTA [facility]  
2/20/2019 11:00 AM Tanya Pile, OTR/L [facility]  
2/25/2019  9:30 AM Kristan Grubbs, PTA [facility]  
2/25/2019 10:15 AM Tanya Townsend, OTR/L [facility]  
2/27/2019 10:15 AM Tanya Townsend, OTR/L [facility]  
2/27/2019 11:00 AM Rhys Burrell, PT [facility]

## 2019-02-06 NOTE — PROGRESS NOTES
PT DAILY TREATMENT NOTE 10-18 Patient Name: Shirin Fleming Date:2019 : 1950 [x]  Patient  Verified Payor: [place] MED[place]ARE / Plan: Armen Candelario y / Product Type: Medicare / In time: 10:23  Out time: 11:02 Total Treatment Time (min): 39 Visit #: 6 of  Medicare/BCBS Only Total Timed Codes (min):  39 1:1 Treatment Time:  44 Treatment Area: Muscle weakness (generalized) [M62.81] SUBJECTIVE Pain Level (0-10 scale): 0/10 Any medication changes, allergies to medications, adverse drug reactions, diagnosis change, or new procedure performed?: [x] No    [] Yes (see summary sheet for update) Subjective functional status/changes:   [] No changes reported Pt. Reports she is doing pretty good today. She continues to require assistance with bed mobility. OBJECTIVE 29 min Therapeutic Exercise:  [x] See flow sheet :  
Rationale: increase ROM, increase strength, improve coordination and improve balance to improve the patients ability to increase ease of ADLs 10 min Gait Training:  _5 and 20__ feet with _hemiwalker__ device on level surfaces with _CGA__ level of assist  
Rationale: to increase ease of ambulation With 
 [x] TE 
 [] TA 
 [] neuro 
 [] other: Patient Education: [x] Review HEP [] Progressed/Changed HEP based on:  
[] positioning   [] body mechanics   [] transfers   [] heat/ice application   
[] other:   
 
Other Objective/Functional Measures:  
Pt. Ambulated 5 feet with hemiwalker and no strap with CGA She ambulated 20 feet with hemiwalker and strap for left foot facilitation Pt. Was challenged with Rhomberg balance with eyes closed Pain Level (0-10 scale) post treatment: 0/10 ASSESSMENT/Changes in Function:  Pt. Is slowly progressing towards goals. She was able to ambulate 5 feet without assistance with bringing left LE forward. She continues to have poor weight shift to left side when stepping. Patient will continue to benefit from skilled PT services to modify and progress therapeutic interventions, address functional mobility deficits, address ROM deficits, address strength deficits, analyze and address soft tissue restrictions, analyze and cue movement patterns, analyze and modify body mechanics/ergonomics and assess and modify postural abnormalities to attain remaining goals. Progress towards goals / Updated goals: 1. Patient will improve FOTO score by 13 points in order to demonstrate a significant improvement in function. 2. Patient will ambulation 50 feet with hemiwalker and SBA with no rest breaks in order to increase ease of ambulation at home.  
Not met: 5 feet without assistance with left LE (2/6/19) 3. Patient will be able to ambulate around 10 cones with hemiwalker and SBA in order to increase ease of ambulation around home. PLAN 
[]  Upgrade activities as tolerated     [x]  Continue plan of care 
[]  Update interventions per flow sheet      
[]  Discharge due to:_ 
[]  Other:_ Veronica Ordoñez, PT 2/6/2019  10:00 AM 
 
Future Appointments Date Time Provider Wolf Peters 2/6/2019 10:30 AM Sandra Baumann PT [facility]CPTPB [facility]  
2/8/2019 10:30 AM Rebecca Cox [id number] [facility]  
2/8/2019 11:30 AM Sandra Baumann PT [facility]  
2/11/2019 10:15 AM Rebecca Cox IASRUZQ [facility]  
2/11/2019 11:00 AM Sandra Baumann PT [facility]  
2/13/2019 10:00 AM Sandra Baumann PT [facility]  
2/13/2019 11:00 AM Rebecca Cox LOWTCAO [facility]  
2/18/2019 10:30 AM Sandra Baumann PT [facility]  
2/18/2019 11:00 AM VESNA De La Fuente/L [facility]  
2/20/2019 10:30 AM Nuvia Jensen PTA [facility]  
2/20/2019 11:00 AM Mekhi Fuentes, OTR/L [facility]  
2/25/2019  9:30 AM Nuvia Jensen, PTA [facility]  
2/25/2019 10:15 AM Mekhi Fuentes, OTR/L [facility]  
2/27/2019 10:15 AM Mekhi Fuentes, OTR/L [facility]  
 2/27/2019 11:00 AM Cristal Thornton, PT [facility]

## 2019-02-08 ENCOUNTER — HOSPITAL ENCOUNTER (OUTPATIENT)
Dept: PHYSICAL THERAPY | Age: 69
Discharge: HOME | End: 2019-02-08
Payer: MEDICARE

## 2019-02-08 PROCEDURE — 97110 THERAPEUTIC EXERCISES: CPT

## 2019-02-08 PROCEDURE — 97530 THERAPEUTIC ACTIVITIES: CPT

## 2019-02-08 PROCEDURE — 97116 GAIT TRAINING THERAPY: CPT

## 2019-02-08 NOTE — PROGRESS NOTES
PT DAILY TREATMENT NOTE 10-18 Patient Name: Jose Albarran Date:2019 : 1950 [x]  Patient  Verified Payor: [place] MEDICARE / Plan: Armen Candelario y / Product Type: Medicare / In time: 11:20  Out time: 11:55 Total Treatment Time (min): 35 Visit #: 7 of -36 Medicare/BCBS Only Total Timed Codes (min):  35 1:1 Treatment Time:  35 Treatment Area: Muscle weakness (generalized) [M62.81] SUBJECTIVE Pain Level (0-10 scale): 0/10 Any medication changes, allergies to medications, adverse drug reactions, diagnosis change, or new procedure performed?: [x] No    [] Yes (see summary sheet for update) Subjective functional status/changes:   [] No changes reported Pt. Reports she is feeling tired today. OBJECTIVE 27 min Therapeutic Exercise:  [x] See flow sheet :  
Rationale: increase ROM and increase strength to improve the patients ability to increase ease of ambulation 8 min Gait Training:  _15__ feet with _hemiwalker__ device on level surfaces with _CGA__ level of assist  
Rationale: to increase ease of ambulation at home With 
 [x] TE 
 [] TA 
 [] neuro 
 [] other: Patient Education: [x] Review HEP [] Progressed/Changed HEP based on:  
[] positioning   [] body mechanics   [] transfers   [] heat/ice application   
[] other:   
 
Other Objective/Functional Measures:  
Pt. Continues to have significant hypertonicity and weakness in left LE She is able to ambulate a couple feet without strap on foot to bring left LE forward Pt. Perform wheelchair to table transfer with CGA Sit to supine and supine to sit transfers with SBA Pain Level (0-10 scale) post treatment: 0/10 ASSESSMENT/Changes in Function: pt. Is progressing slowly towards goals. She continues to have difficulty with bring left LE fwd during ambulation secondary to significant left LE weakness and hypertonicity. She demonstrates improvements in her transfers and bed mobility since [facility].  Pt. Continues to also be limited by decreased activity tolerance. Patient will continue to benefit from skilled PT services to modify and progress therapeutic interventions, address functional mobility deficits, address ROM deficits, address strength deficits, analyze and address soft tissue restrictions, analyze and cue movement patterns, analyze and modify body mechanics/ergonomics and assess and modify postural abnormalities to attain remaining goals. Progress towards goals / Updated goals: 1. Patient will improve FOTO score by 13 points in order to demonstrate a significant improvement in function. 2. Patient will ambulation 50 feet with hemiwalker and Mallissa Blazer no rest breaks in order to increase ease of ambulation at home.  
Not met: 5 feet without assistance with left LE (19) 3. Patient will be able to ambulate around 10 cones with hemiwalker and SBA in order to increase ease of ambulation around home. PLAN 
[]  Upgrade activities as tolerated     [x]  Continue plan of care 
[]  Update interventions per flow sheet      
[]  Discharge due to:_ 
[]  Other:_ Maciel Agudelo PT 2019  10:38 AM 
 
Future Appointments Date Time Provider Wolf Peters 2019 11:30 AM [place] , PT [facility]  
2019 10:15 AM Geovanna Cervantes [id number] [facility]  
2019 11:[facility]  
2019 10:00 [facility]  
2019 11:00 AM Geovanna Cervantes [id number] [facility]  
2019 10:30 [facility]  
2019 11:00 AM Amarjit Aaron, OTR/L [facility]  
2019 10:30 AM Bindu García, PTA [facility]  
2019 11:00 AM Amarjit Aaron OTR/L [facility]  
2019  9:30 AM Bindu García, PTA [facility]  
2019 10:15 AM Amarjit Aaron, OTR/L [facility]  
2019 10:15 AM Amarjit Aaron, OTR/L [facility]  
2019 11:00 AM Case Velazco, PT [facility]

## 2019-02-08 NOTE — PROGRESS NOTES
OT DAILY TREATMENT NOTE 10-18 Patient Name: Fransisco Moran Date:2019 : 1950 [x]  Patient  Verified Payor: [place] MEDICARE / Plan: Armen Candelario [facility] / Product Type: Medicare / In time:1030  Out time:1115 Total Treatment Time (min): 45 Visit #: 3 of  Medicare/BCBS Only Total Timed Codes (min):  45 1:1 Treatment Time:  45  
 
Treatment Area: Muscle weakness (generalized) [M62.81] SUBJECTIVE Pain Level (0-10 scale): 0/10 Any medication changes, allergies to medications, adverse drug reactions, diagnosis change, or new procedure performed?: [x] No    [] Yes (see summary sheet for update) Subjective functional status/changes:   [] No changes reported I have been getting a massage on that arm. It has helped a lot. OBJECTIVE 30 min Therapeutic Exercise:  [] See flow sheet :  
Rationale: increase ROM and increase strength to improve the patients ability to  Yellow therabar pronation x 10 Red therabar [id number] Soft Putty with tools, left hand 10/10 Graded clothes pins, 6# + 8# with foam pieces, 3 pt pinch 15 min Therapeutic Activity:  []  See flow sheet :  
Rationale: increase ROM and improve coordination  to improve the patients ability to manipulate small items Pt used Left hand to thread 10 medium beads onto string With 
 [] TE 
 [] TA 
 [] neuro 
 [] other: Patient Education: [x] Review HEP [] Progressed/Changed HEP based on:  
[] positioning   [] body mechanics   [] transfers   [] heat/ice application  
[] Splint wear/care   [] Sensory re-education   [] scar management     
[] other:   
 
     
Other Objective/Functional Measures: encouragement and increased time required for bead threading task Pain Level (0-10 scale) post treatment: 0/10 ASSESSMENT/Changes in Function:  FM skills improving Patient will continue to benefit from skilled OT services to modify and progress therapeutic interventions, address ROM deficits, address strength deficits and instruct in home and community integration to attain remaining goals. []  See Plan of Care 
[]  See progress note/recertification 
[]  See Discharge Summary Progress towards goals / Updated goals: Leighton Hanna will improve  and pinches in left hand by 10# and 2-3# to help with opening packages and containersprogressing with therabar and clothespins 2/8/19 
  
Fely Baker will be able to flex shoulder forward to 90 repeatedly to place items on counter from wheelchair level without pain due to improved scapulohumeral rhythm. 
  
Goal:3. 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  
  
4.  Update home program for ROm strength and fine motor skills 
  
 
 
 
PLAN 
[]  Upgrade activities as tolerated     [x]  Continue plan of care 
[]  Update interventions per flow sheet      
[]  Discharge due to:_ 
[]  Other:_ TOMAS Delarosa/L 2/8/2019  10:15 AM 
 
Future Appointments Date Time Provider Wolf Peters 2/8/2019 10:30 AM Jarvis Siegel [id number] [facility]  
2/8/2019 11:30 AM Keely West, PT [facility]  
2/11/2019 10:15 AM Jarvis Siegel [id number] [facility]  
2/11/2019 11:00 AM Keely West, PT [facility]  
2/13/2019 10:00 AM Keely West, PT [facility]  
2/13/2019 11:00 AM Jarvis Siegel [id number] [facility]  
2/18/2019 10:30 AM Keely West, PT [facility]  
2/18/2019 11:00 AM VESNA Mcmanus/LORENA [facility]  
2/20/2019 10:30 AM Natasha Mccabe PTA [facility]  
2/20/2019 11:00 AM Lara Aaron, OTR/L [facility]  
2/25/2019  9:30 AM Natasha Mccabe, PTA [facility]  
2/25/2019 10:15 AM Lara Aaron, OTR/L [facility]  
2/27/2019 10:15 AM Lara Aaron, OTR/L [facility]  
2/27/2019 11:00 AM Keely West, PT [facility]

## 2019-02-11 ENCOUNTER — HOSPITAL ENCOUNTER (OUTPATIENT)
Dept: PHYSICAL THERAPY | Age: 69
Discharge: HOME | End: 2019-02-11
Payer: MEDICARE

## 2019-02-11 PROCEDURE — 97110 THERAPEUTIC EXERCISES: CPT

## 2019-02-11 PROCEDURE — 97530 THERAPEUTIC ACTIVITIES: CPT

## 2019-02-11 NOTE — PROGRESS NOTES
PT DAILY TREATMENT NOTE 10-18 Patient Name: Brooks Dueñas Date:2019 : 1950 [x]  Patient  Verified Payor: [place] MEDICARE / Plan: Armen Candelario nils / Product Type: Medicare / In time: 11:00  Out time: 11:30 Total Treatment Time (min): 30 Visit #: 5 of - Medicare/BCBS Only Total Timed Codes (min):  30 1:1 Treatment Time:  30 Treatment Area: Muscle weakness (generalized) [M62.81] SUBJECTIVE Pain Level (0-10 scale): 0/10 Any medication changes, allergies to medications, adverse drug reactions, diagnosis change, or new procedure performed?: [x] No    [] Yes (see summary sheet for update) Subjective functional status/changes:   [] No changes reported Pt. Reports she is doing pretty good today. OBJECTIVE 30 min Therapeutic Exercise:  [x] See flow sheet :  
Rationale: increase ROM, increase strength and improve coordination to improve the patients ability to increase ease of ambulation and tranfsers With 
 [x] TE 
 [] TA 
 [] neuro 
 [] other: Patient Education: [x] Review HEP [] Progressed/Changed HEP based on:  
[] positioning   [] body mechanics   [] transfers   [] heat/ice application   
[] other:   
 
Other Objective/Functional Measures:  
Pt. Tolerated PT well with no reports of increased pain She continues to have difficulty with bringing left LE forward during ambulation. 5 feet x2 She had difficulty scooting left hip fwd in wheelchair Pt. Required cues to improve fwd lean during sit to stand transfers Pain Level (0-10 scale) post treatment: 0/10 ASSESSMENT/Changes in Function:  Pt. Is making limited progress towards goals. She continues to require assistance to bring left LE fwd during ambulation. She also continues to have poor right foot DF mobility and decreased weight shift to left during ambulation. She demonstrates improving sit to stand transfers but continues to require UE support. Patient will continue to benefit from skilled PT services to modify and progress therapeutic interventions, address functional mobility deficits, address ROM deficits, address strength deficits, analyze and address soft tissue restrictions and analyze and cue movement patterns to attain remaining goals. Progress towards goals / Updated goals: 1. Patient will improve FOTO score by 13 points in order to demonstrate a significant improvement in function. 2. Patient will ambulation 50 feet with hemiwalker and Gaudencio [place] no rest breaks in order to increase ease of ambulation at home.  
Not met: 5 feet without assistance with left LE (2/11/19) 3. Patient will be able to ambulate around 10 cones with hemiwalker and SBA in order to increase ease of ambulation around home. PLAN 
[]  Upgrade activities as tolerated     [x]  Continue plan of care 
[]  Update interventions per flow sheet      
[]  Discharge due to:_ 
[]  Other:_ Josey Ignacio, PT 2/11/2019  12:53 PM 
 
Future Appointments Date Time Provider Wolf Peters 2/13/2019 10:00 AM Antoinette Nunes PT [facility]  
2/13/2019 11:00 AM Konstantin GUEVARA [facility]  
2/18/2019 10:30 AM Antoniette Nunes PT [facility]  
2/18/2019 11:00 AM Leny Burrell, OTR/L [facility]  
2/20/2019 10:30 AM Almeda Osler, PTA [facility]  
2/20/2019 11:00 AM Leny Burrell, OTR/L SPJMMSX [facility]  
2/25/2019  9:30 AM Almeda Osler, PTA [facility]  
2/25/2019 10:15 AM Leny Burrell, OTR/L [facility]  
2/27/2019 10:15 AM Leny Burrell, OTR/L [facility]  
2/27/2019 11:00 AM Antoinette Nunes, PT [facility]

## 2019-02-11 NOTE — PROGRESS NOTES
OT DAILY TREATMENT NOTE 10-18 Patient Name: Kacie Christina Date:2019 : 1950 [x]  Patient  Verified Payor: [place] MEDICARE / Plan: Armen Candelario nils / Product Type: Medicare / In time:1015  Out time:1059 Total Treatment Time (min): 44 Visit #: 4 of  Medicare/BCBS Only Total Timed Codes (min):  44 1:1 Treatment Time:  44 Treatment Area: Muscle weakness (generalized) [M62.81] SUBJECTIVE Pain Level (0-10 scale): 0/10 Any medication changes, allergies to medications, adverse drug reactions, diagnosis change, or new procedure performed?: [x] No    [] Yes (see summary sheet for update) Subjective functional status/changes:   [] No changes reported Pt reports feeling tired on this date OBJECTIVE 29 min Therapeutic Exercise:  [] See flow sheet :  
Rationale: increase ROM and increase strength to improve the patients ability to , pinch Yellow therabar pronation x 10 Red therabar [id number] Blue Web:Left: Lumbrical, Grasp pull, grasp punch 10x Soft Theraputty: Left hand used to manipulate soft theraputty set at extended distance to encourage shoulder flexion, to reveal and remove 1/4 pegs: Active Forward Shoulder flexion to place cup on \"Shelf\" 8x with physical assist to improve movement patterns 15 min Therapeutic Activity:  []  See flow sheet :  
Rationale: increase ROM and improve coordination  to improve the patients ability to pinch, manipulate small items Seasonal Hole Punch with Left hand: Task performed at midline with use of B Coordination and pinch prehension to punch holes in paper. Perfection: Timed, Left With 
 [] TE 
 [] TA 
 [] neuro 
 [] other: Patient Education: [x] Review HEP [] Progressed/Changed HEP based on:  
[] positioning   [] body mechanics   [] transfers   [] heat/ice application  
[] Splint wear/care   [] Sensory re-education   [] scar management     
[] other: Other Objective/Functional Measures:  
 
Physical cueing/assitance with proper pinch prehension for increased strength to complete hole punch Perfection: 8:28, break at 5:54 due to fatigue Pain Level (0-10 scale) post treatment: 0/10 ASSESSMENT/Changes in Function: Cueing for forward shoulder flexion Patient will continue to benefit from skilled OT services to modify and progress therapeutic interventions, address ROM deficits, address strength deficits and instruct in home and community integration to attain remaining goals. []  See Plan of Care 
[]  See progress note/recertification 
[]  See Discharge Summary Progress towards goals / Updated goals: 
Fidel Taylor will improve  and pinches in left hand by 10# and 2-3# to help with opening packages and containersprogressing with therabar and clothespins 2/11/19 
  
Susana Rodriguez will be able to flex shoulder forward to 90 repeatedly to place items on counter from wheelchair level without pain due to improved scapulohumeral rhythm. Progressing with in clinic activities 2/11/19 
  
Goal:3. 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  
  
4.  Update home program for ROm strength and fine motor skills PLAN 
[]  Upgrade activities as tolerated     [x]  Continue plan of care 
[]  Update interventions per flow sheet      
[]  Discharge due to:_ 
[]  Other:_ MAICOL Yo 2/11/2019  8:46 AM 
 
Future Appointments Date Time Provider Wolf Peters 2/11/2019 10:15 AM Sherrie Jaramillo [id number] [facility]  
2/11/2019 11:00 AM Perla Armstrong PT [facility]  
2/13/2019 10:00 AM Perla Armstrong PT [facility]  
2/13/2019 11:00 AM Sherrie Jaramillo [id number] [facility]  
2/18/2019 10:30 AM Perla Armstrong PT [facility]  
2/18/2019 11:00 AM ELIAS Boudreaux [facility]CPTPB [facility]  
2/20/2019 10:30 AM Nelma Lava, PTA [facility]  
 2/20/2019 11:00 AM Bruno Carvalho, OTR/L [facility]  
2/25/2019  9:30 AM Elly Guzman, PTA [facility]  
2/25/2019 10:15 AM Bruno Carvalho, OTR/L [facility]  
2/27/2019 10:15 AM Bruno Carvalho, OTR/L [facility]  
2/27/2019 11:00 AM Trae Cornejo, PT [facility]

## 2019-02-13 ENCOUNTER — APPOINTMENT (OUTPATIENT)
Dept: PHYSICAL THERAPY | Age: 69
End: 2019-02-13
Payer: MEDICARE

## 2019-02-18 ENCOUNTER — HOSPITAL ENCOUNTER (OUTPATIENT)
Dept: PHYSICAL THERAPY | Age: 69
Discharge: HOME | End: 2019-02-18
Payer: MEDICARE

## 2019-02-18 PROCEDURE — 97116 GAIT TRAINING THERAPY: CPT

## 2019-02-18 PROCEDURE — 97110 THERAPEUTIC EXERCISES: CPT

## 2019-02-18 NOTE — PROGRESS NOTES
OT DAILY TREATMENT NOTE 10-18 Patient Name: Edwin Pelaez Date:2019 : 1950 [x]  Patient  Verified Payor: /   
In time:1100   Out [id number] Total Treatment Time (min): 40 Visit #: 5 of  Medicare/BCBS Only Total Timed Codes (min):  40 1:1 Treatment Time:  40 Treatment Area: Muscle weakness (generalized) [M62.81] SUBJECTIVE Pain Level (0-10 scale): 0/10 Any medication changes, allergies to medications, adverse drug reactions, diagnosis change, or new procedure performed?: [x] No    [] Yes (see summary sheet for update) Subjective functional status/changes:   [] No changes reported I need a break OBJECTIVE 40 min Therapeutic Exercise:  [] See flow sheet :  
Rationale: increase strength to improve the patients ability to grasp, pinch Blue web increased to 15  pull bow pull and lumb pull left hand Med putty with large round tool using pronated grasp to flatten left hand x 10 pegs Wrist flex ext sup pro and RD UD x 10 Graded clothespins 8# x 10 all types of pinches left hand With 
 [] TE 
 [] TA 
 [] neuro 
 [] other: Patient Education: [x] Review HEP [] Progressed/Changed HEP based on: wrist ex HEP [] positioning   [] body mechanics   [] transfers   [] heat/ice application  
[] Splint wear/care   [] Sensory re-education   [] scar management     
[] other:   
 
     
Other Objective/Functional Measures:  
Poor control of RD in mid position Pain Level (0-10 scale) post treatment: 0/10 ASSESSMENT/Changes in Function: Requires rest breaks during limited strengthening tasks Patient will continue to benefit from skilled OT services to modify and progress therapeutic interventions, address strength deficits, analyze and cue movement patterns and instruct in home and community integration to attain remaining goals. []  See Plan of Care 
[]  See progress note/recertification 
[]  See Discharge Summary Progress towards goals / Updated goals: Bennie Vaz will improve  and pinches in left hand by 10# and 2-3# to help with opening packages and containersprogressing with therabar and clothespins 2/11/19 
  
Janie Cruz will be able to flex shoulder forward to 90 repeatedly to place items on counter from wheelchair level without pain due to improved scapulohumeral rhythm. Progressing with in clinic activities 2/11/19 
  
Goal:3. 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  
  
4.  Update home program for ROm strength and fine motor skillsadded wrist HEP 2/18/19 
  
 
** PLAN [x]  Upgrade activities as tolerated     [x]  Continue plan of care 
[]  Update interventions per flow sheet      
[]  Discharge due to:_ 
[]  Other:_ VESNA Maher/L 2/18/2019  2:17 PM 
 
Future Appointments Date Time Provider Wolf Peters 2/20/2019 10:30 AM Almeda Osler, PTA [facility]  
2/20/2019 11:00 AM Leny Burrell, OTR/L [facility]  
2/25/2019  9:30 AM Almeda Osler, PTA [facility]  
2/25/2019 10:15 AM Leny Burrell OTR/L [facility]  
2/27/2019 10:15 AM Leny Burrell OTR/L [facility]  
2/27/2019 11:00 AM Antoinette Nunes, PT [facility]

## 2019-02-18 NOTE — PROGRESS NOTES
PT DAILY TREATMENT NOTE 10-18 Patient Name: Saran Ba Date:2019 : 1950 [x]  Patient  Verified Payor: [place] [employer] / Plan: Armen Candelario nils / Product Type: Medicare / In time: 10:28  Out time: 11:01 Total Treatment Time (min): 33 Visit #: 6 of  Medicare/BCBS Only Total Timed Codes (min):  33 1:1 Treatment Time:  35 Treatment Area: Muscle weakness (generalized) [M62.81] SUBJECTIVE Pain Level (0-10 scale): 0/10 Any medication changes, allergies to medications, adverse drug reactions, diagnosis change, or new procedure performed?: [x] No    [] Yes (see summary sheet for update) Subjective functional status/changes:   [] No changes reported Gait training at home \"most days\" OBJECTIVE 13 min Therapeutic Exercise:  [x] See flow sheet :  
Rationale: increase strength to improve the patients ability to increase functional strength of BLE. 20 min Gait Trainin' x2 & 25' x1 with lex walker & SPT strap assisted DF & LLE swing. Rationale: increase pt ability to ambulate in home for increased ind with ADL completion. With 
 [x] TE 
 [] TA 
 [] neuro 
 [] other: Patient Education: [x] Review HEP [] Progressed/Changed HEP based on:  
[] positioning   [] body mechanics   [] transfers   [] heat/ice application   
[] other:   
 
Other Objective/Functional Measures:  
- 121/73mmHg  
- 81 BPM  
- ambulation of 21' x 21 & 25' x  
- LAQ with assistance x 5; (stopped due to fatigue) Pain Level (0-10 scale) post treatment: 0/10 ASSESSMENT/Changes in Function: Pt progressing well toward long term PT goals as evidenced by ability to ambulate greater distances prior to fatigue; 21' x 2 & 25' x1; up from 13' & 20' at previous treatment sessions. Pt able to complete all gait & TE today with no c/o of pain and minimal cuing.  Pt continues to demo very poor right quad strength as evidenced by significant SPT assistance in for Yael Stevens presents to clinic with AFO however reports not wearing it often at home. AFO & strap assisted DF and LLE progression in swing phase of gait; persistent hyperextension of LLE in all stance phases of gait. Pt puts forth good effort throughout session entirety & remains motivated. Patient will continue to benefit from skilled PT services to modify and progress therapeutic interventions, address functional mobility deficits, address ROM deficits, address strength deficits, analyze and address soft tissue restrictions, analyze and cue movement patterns, analyze and modify body mechanics/ergonomics, assess and modify postural abnormalities and address imbalance/dizziness to attain remaining goals. Progress towards goals / Updated goals: 1. Patient will improve FOTO score by 13 points in order to demonstrate a significant improvement in function. 2. Patient will ambulation 50 feet with hemiwalker and [place] no rest breaks in order to increase ease of ambulation at home.  
Not met: 5 feet without assistance with left LE (2/11/19) 3. Patient will be able to ambulate around 10 cones with hemiwalker and SBA in order to increase ease of ambulation around home. PLAN [x]  Upgrade activities as tolerated     [x]  Continue plan of care 
[]  Update interventions per flow sheet      
[]  Discharge due to:_ 
[]  Other:_ Christiano Benavides, SPT 2/18/2019  7:57 AM 
 
I was present during the entire treatment, directing and participating in the treatment. Alvino Benites DPT Future Appointments Date Time Provider Wolf Peters 2/18/2019 10:30 AM Colton Mcdowell, PT [facility]  
2/18/2019 11:00 AM Yordy Tanner, OTR/L [facility]  
2/20/2019 10:30 AM Judythe Daily, PTA [facility]  
2/20/2019 11:00 AM Yordy Tanner, OTR/L [facility]  
2/25/2019  9:30 AM Judythe Daily, PTA [facility]  
2/25/2019 10:15 AM Yordy Tanner, OTR/L [facility]  
 2/27/2019 10:15 AM VESNA Ricketts/L [facility]  
2/27/2019 11:00 AM Trevon Ortiz PT [facility]

## 2019-02-20 ENCOUNTER — APPOINTMENT (OUTPATIENT)
Dept: PHYSICAL THERAPY | Age: 69
End: 2019-02-20
Payer: MEDICARE

## 2019-02-25 ENCOUNTER — HOSPITAL ENCOUNTER (OUTPATIENT)
Dept: PHYSICAL THERAPY | Age: 69
Discharge: HOME | End: 2019-02-25
Payer: MEDICARE

## 2019-02-25 PROCEDURE — 97110 THERAPEUTIC EXERCISES: CPT

## 2019-02-25 PROCEDURE — 97530 THERAPEUTIC ACTIVITIES: CPT

## 2019-02-25 NOTE — PROGRESS NOTES
PT DAILY TREATMENT NOTE 10-18 Patient Name: Nini Marin Date:2019 : 1950 [x]  Patient  Verified Payor: Hussein Summers / [facility] / Product Type: Managed Care Medicare / In time:9:30  Out time:10:00 Total Treatment Time (min): 30 Visit #: 8 of -36 (Per MD noted signed on 19) Medicare/BCBS Only Total Timed Codes (min):  30 1:1 Treatment Time:  30 Treatment Area: Muscle weakness (generalized) [M62.81] SUBJECTIVE Pain Level (0-10 scale): 0/10 Any medication changes, allergies to medications, adverse drug reactions, diagnosis change, or new procedure performed?: [x] No    [] Yes (see summary sheet for update) Subjective functional status/changes:   [] No changes reported Pt reports she is doing the walking at home but needs to work on her strength. She prefers to not do laying down exercises. OBJECTIVE 30 min Therapeutic Exercise:  [x] See flow sheet :  
Rationale: increase ROM, increase strength, improve coordination, improve balance and increase proprioception to improve the patients ability to improve left LE strength for ease of transfers and ambulation With 
 [] TE 
 [] TA 
 [] neuro 
 [] other: Patient Education: [x] Review HEP [] Progressed/Changed HEP based on:  
[] positioning   [] body mechanics   [] transfers   [] heat/ice application   
[] other:   
 
Other Objective/Functional Measures:  
BP: 146/80 HR: 66 bpm 
Challenged with left LE strength L/s compensation with standing exercises Cues to prevent locking left in extension in standing Lack of DF in standing with heel off the ground Fatigue with LAQ Pt pleased with trying leg press; assistance needed for placement of leg and holding onto machine but pt fatigued at 10 reps Pain Level (0-10 scale) post treatment: 0/10 ASSESSMENT/Changes in Function: Pt is making slow progress towards goals due to fatigue and weakness of the left LE. She continues to have to utilize a strap to help assist the left LE forward with ambulation. She has increased compensation using the right LE for transfers. Will trial a few more sessions to work on strength, balance and gait and progress with a HEP in order to progress at home. Progress towards goals / Updated goals: 1. Patient will improve FOTO score by 13 points in order to demonstrate a significant improvement in function. 2. Patient will ambulation 50 feet with hemiwalker and Florecita Bicker no rest breaks in order to increase ease of ambulation at home.  
Not met: 5 feet without assistance with left LE (2/11/19) 3. Patient will be able to ambulate around 10 cones with hemiwalker and SBA in order to increase ease of ambulation around home. Not tested PLAN [x]  Upgrade activities as tolerated     [x]  Continue plan of care 
[]  Update interventions per flow sheet      
[]  Discharge due to:_ 
[]  Other:_ Bandar Hastings, IRVIN 2/25/2019  10:08 AM 
 
Future Appointments Date Time Provider Wolf Peters 2/25/2019 10:15 AM Amie Rushing, OTR/L [facility]  
2/27/2019 10:15 AM Amie Rushing, OTR/L [facility]  
2/27/2019 11:00 AM Odilia Ramirez PT [facility]

## 2019-02-25 NOTE — PROGRESS NOTES
OT DAILY TREATMENT NOTE 10-18 Patient Name: Saran Ba Date:2019 : 1950 [x]  Patient  Verified Payor: Fifi Pedroza / Plan: [facility] MEDICARE COMPLETE / Product Type: Managed Care Medicare / In time:1005  Out time:1050 Total Treatment Time (min): 45 Visit #: **6* of -24 Medicare/BCBS Only Total Timed Codes (min):  45 1:1 Treatment Time:  45  
 
Treatment Area: Muscle weakness (generalized) [M62.81] SUBJECTIVE Pain Level (0-10 scale): 0/10 Any medication changes, allergies to medications, adverse drug reactions, diagnosis change, or new procedure performed?: [x] No    [] Yes (see summary sheet for update) Subjective functional status/changes:   [] No changes reported I am getting stronger every day OBJECTIVE 30 min Therapeutic Exercise:  [] See flow sheet :  
Rationale: increase strength to improve the patients ability to grasp, lift Recheck for  and pinch Red therabar with discomfort in hand after 15 reps of pronation, completed 15 each sup and pro Blue web  pull, bow pull and lumb pull with demo x 15 
 
15 min Therapeutic Activity:  []  See flow sheet :  
Rationale: improve coordination  to improve the patients ability to manipulate items Recheck with 9 hole noted to have poor accuracy in reaching to  pegs 1 in pegs with left hand with accuracy of reach also noted [place] completed with left hand With 
 [x] TE 
 [] TA 
 [] neuro 
 [] other: Patient Education: [x] Review HEP [] Progressed/Changed HEP based on: progress made 
[] positioning   [] body mechanics   [] transfers   [] heat/ice application  
[] Splint wear/care   [] Sensory re-education   [] scar management     
[] other:   
 
     
Other Objective/Functional Measures:  
 
Hand Strength: Gross Grasp 3pt Pinch Lateral Pinch Tip Pinch Right  20 (8) 10 (7) 10 (6) 7 (5) Left      
9 hole  44 [phone]) [address] [facility] Able to reach to 80 x 10 without discomfort Pain Level (0-10 scale) post treatment: 0/10 ASSESSMENT/Changes in Function: IMproved strength Patient will continue to benefit from skilled OT services to modify and progress therapeutic interventions, address ROM deficits, address strength deficits, analyze and address soft tissue restrictions, analyze and cue movement patterns and instruct in home and community integration to attain remaining goals. []  See Plan of Care 
[]  See progress note/recertification 
[]  See Discharge Summary Progress towards goals / Updated goals: Eugene Rowe will improve  and pinches in left hand by 10# and 2-3# to help with opening packages and containersmet 2/25/19 
  
Michelle Taylor will be able to flex shoulder forward to 90 repeatedly to place items on counter from wheelchair level without pain due to improved scapulohumeral rhythm. Progressing able to reach ot 80 repetitively without pain 2/25/19  
Goal:3. Patient will report being independent with washing hair using Left UE by increasing Shoulder ROM patient reported progressing 2/25/19 
  
4.  Update home program for ROM strength and fine motor skillsadded wrist HEP 2/18/19 
  
 
** PLAN [x]  Upgrade activities as tolerated     [x]  Continue plan of care 
[]  Update interventions per flow sheet      
[]  Discharge due to:_ 
[]  Other:_ VESNA Ahn/L 2/25/2019  10:06 AM 
 
Future Appointments Date Time Provider Wolf Peters 2/25/2019 10:15 AM VESNA Jarrett/L [facility]  
2/27/2019 10:15 AM Salma Hughes OTR/L [facility]  
2/27/2019 11:00 AM Negra Martins, PT [facility]

## 2019-02-25 NOTE — PROGRESS NOTES
[facility] [address] [facility] 
[phone] fax Continued Plan of Care/ Re-certification for Physical Therapy Services Patient name: Geoff Velasco Start of Care:  10/1/18 Referral source: Carla Palma MD : 1950 Medical/Treatment Diagnosis: Muscle weakness (generalized) [M62.81] Payor: Ashley Conteh / Plan: [facility] MEDICARE COMPLETE / Product Type: Managed Care Medicare /  Onset Date: 2018 Prior Hospitalization: see medical history Provider#: [id number] Medications: Verified on Patient Summary List   
Comorbidities: HTN Prior Level of Function: Ind with ambulation, working, Ind with ADLs Visits from Start of Care: 34    Missed Visits: 3 The Plan of Care and following information is based on the patient's current status: 
Goal: Patient will improve FOTO score by 13 points in order to demonstrate a significant improvement in function. Status at last note/certification: 45 
Current Status: not met Goal: Patient will ambulation 50 feet with hemiwalker and SBA with no rest breaks in order to increase ease of ambulation at home.  
Status at last note/certification: unable Current Status: not met Goal: Patient will be able to ambulate around 10 cones with hemiwalker and SBA in order to increase ease of ambulation around home. Status at last note/certification: unable Current Status: not met Key functional changes: pt. Is able to ambulate 5 feet with hemiwalker and SBA Problems/ barriers to goal attainment: left LE weakness Problem List: decrease ROM, decrease strength, impaired gait/ balance, decrease ADL/ functional abilitiies, decrease activity tolerance, decrease flexibility/ joint mobility and decrease transfer abilities Treatment Plan: Therapeutic exercise, Therapeutic activities, Neuromuscular re-education, Physical agent/modality, Gait/balance training, Manual therapy, Patient education, Self Care training, Functional mobility training, Home safety training and Stair training Patient Goal (s) has been updated and includes: to walk better Goals for this certification period to be accomplished in 1 treatments: 1. Patient will improve FOTO score by 13 points in order to demonstrate a significant improvement in function. 2. Patient will demonstrate understanding of her HEP in order to prepare for D/C. Frequency / Duration: Patient to be seen 1 times per week for 1 visit Assessment / Recommendations: pt. Made initial progress with physical therapy, but now progress is starting to plateau. She was able to ambulate 5 feet with hemiwalker and SBA but is significant limited by left LE weakness. She is able to ambulate further with strap to assist bring left leg fwd, but gets fatigued after ~20 feet. She is also limited by significant decrease in left DF mobility secondary to contracture. She was issued a dynam splint for low load, long duration stretching but has not been using it consistently. Skilled PT is medically necessary in order to establish a HEP in order for pt. To continue to improve following D/C. Certification Period: 2/25/19-3/26/19 Brenda Huff, PT 2/25/2019 11:51 AM 
 
________________________________________________________________________ I certify that the above Therapy Services are being furnished while the patient is under my care. I agree with the treatment plan and certify that this therapy is necessary. [] I have read the above and request that my patient continue as recommended. [] I have read the above report and request that my patient continue therapy with the following changes/special instructions: _______________________________________ [] I have read the above report and request that my patient be discharged from therapy Physician's Signature:____________Date:_________TIME:________ ** Signature, Date and Time must be completed for valid certification ** Please sign and return [facility] [address] [place] 
[phone] fax

## 2019-02-26 NOTE — PROGRESS NOTES
[facility] [address] [facility] 
[phone] fax Continued Plan of Care/ Re-certification for Occupational Therapy Services Patient name: Mike Bailey Start of Care: 10/1/18 Referral source: Irwin Tejada MD, Dr. Zain Peres : 1950 Medical/Treatment Diagnosis: Muscle weakness (generalized) [M62.81] Payor: Lee Singer / Plan: [facility] MEDICARE COMPLETE / Product Type: Managed Care Medicare /  Onset Date:18 Prior Hospitalization: see medical history Provider#: [id number] Medications: Verified on Patient Summary List   
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 
Goal:**Goal 1.  Patient will improve  and pinches in left hand by 10# and 2-3# to help with opening packages and containersmet Status at last note/certification:See chart below Current Status: met Kenan Hubbard will be able to flex shoulder forward to 90 repeatedly to place items on counter from wheelchair level without pain due to improved scapulohumeral rhythm. Status at last note/certification:Unable, limited shoulder flexion with pain and easy fatigue Current Status: not met Progressing, can reach to 80 repeatedly without pain Goal:Goal:3. 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  
Status at last note/certification:Unable to reach crown of head Current Status: not met Progressing, patient reports increased success Goal:4.  Update home program for ROM strength and fine motor skills Status at last note/certification:Had prior HEP with putty Current Status: not met Progressing, added wrist exercises Key functional changes: Current measures are listed below with prior in ( ) Hand Strength: Gross Grasp 3pt Pinch Lateral Pinch Tip Pinch Right  20 (8) 10 (7) 10 (6) 7 (5) Left          
9 hole  44        (38) [place] trial left hand 208 seconds Problems/ barriers to goal attainment: None Treatment Plan: Therapeutic exercise, Therapeutic activities, Physical agent/modality, Manual therapy, Patient education and ADLs/IADLs Patient Goal (s) has been updated and includes: Be able to  better Goals for this certification period to be accomplished in 4 weeks: 1. Patient will improve left hand in hand manipulation by at least 25% with improved praxis with left hand. to avoid accidents in home 2. Patient will be able to flex  left shoulder to 90 x 10 reps without pain  To place items in dresser/or on counter. 3.  Patient will increase left hand  at least additional 5# for ease of opening containers and pulling up clothes. 4.  Patient will be able to fully wash own hair using left assist.   
 
Frequency / Duration: Patient to be seen   2 times per week for 4 weeks: 
 
Assessment / Recommendations:Patient will benefit from continued skilled occupational therapy to increase upper extremity function and functional independence. Patient has ongoing weakness in left UE with apraxia present which limits function. Significant improvement noted in strength this month. Certification Period: 2/26-3/28/19 VESNA Umaña/LORENA 2/26/2019 9:20 AM 
 
________________________________________________________________________ I certify that the above Therapy Services are being furnished while the patient is under my care. I agree with the treatment plan and certify that this therapy is necessary. [de-identified] Signature:____________Date:_________TIME:________ 
 
[employer], Date and Time must be completed for valid certification ** Please sign and return to In [place] [address]. [address] [facility] 
          [phone] fax

## 2019-02-27 ENCOUNTER — APPOINTMENT (OUTPATIENT)
Dept: PHYSICAL THERAPY | Age: 69
End: 2019-02-27
Payer: MEDICARE

## 2019-05-03 ENCOUNTER — HOSPITAL ENCOUNTER (OUTPATIENT)
Dept: PHYSICAL THERAPY | Age: 69
Discharge: HOME | End: 2019-05-03
Payer: MEDICARE

## 2019-05-03 PROCEDURE — 97164 PT RE-EVAL EST PLAN CARE: CPT

## 2019-05-03 PROCEDURE — 97110 THERAPEUTIC EXERCISES: CPT

## 2019-05-03 PROCEDURE — 97168 OT RE-EVAL EST PLAN CARE: CPT

## 2019-05-03 NOTE — PROGRESS NOTES
[facility] [address] [facility] [address] [facility] [address] [facility] 
[phone] fax Continued Plan of Care/ Re-certification for Physical Therapy Services Patient name: Africa Chappell Start of Care:  10/1/18 Referral source: Cholo Brocton., * : 1950 Medical/Treatment Diagnosis: Muscle weakness (generalized) [M62.81] Payor: Brock Moyer / Plan: [facility] MEDICARE COMPLETE / Product Type: Managed Care Medicare /  Onset Date:2018 Prior Hospitalization: see medical history Provider#: [id number] Medications: Verified on Patient Summary List   
Comorbidities: HTN Prior Level of Function: Ind with ambulation, working, Ind with ADLs Visits from Start of Care: 35    Missed Visits: 3 The Plan of Care and following information is based on the patient's current status: 
Goal: Patient will improve FOTO score by 13 points in order to demonstrate a significant improvement in function. Status at last note/certification: 45 
Current Status: not met Goal: Patient will demonstrate understanding of her HEP in order to prepare for D/C. Status at last note/certification: n/a Current Status: not met Key functional changes:  Pt. Is Ind with bedside commode transfers. She requires occasional assistance getting out of bed Problems/ barriers to goal attainment: none Problem List: pain affecting function, decrease ROM, decrease strength, impaired gait/ balance, decrease ADL/ functional abilitiies, decrease activity tolerance, decrease flexibility/ joint mobility and decrease transfer abilities Treatment Plan: Therapeutic exercise, Therapeutic activities, Neuromuscular re-education, Physical agent/modality, Gait/balance training, Manual therapy, Patient education, Self Care training, Functional mobility training and Home safety training Patient Goal (s) has been updated and includes: to walk better Goals for this certification period to be accomplished in 4 weeks: 1. Patient will improve FOTO score by 13 points in order to demonstrate a significant improvement in function. 2. Patient will ambulate 20 feet with CGA and hemiwalker in order to increase ease of ambulation at home 3. Patient will demonstrate understanding of her HEP in order to prepare for D/C. Frequency / Duration: Patient to be seen 2 times per week for 4 weeks: 
Assessment / Recommendations: pt. Was seen today after large gap in treatment from 2/25/19. She reports she has been working on her exercises at home but doesn't do much walking at home. Pt. Reports she is primarily in wheelchair at home. She requires some assistance with supine to sit transfers at home. Pt. Has significant spasticity and weakness in left LE which affects her ambulation. She ambulated 4 feet with CGA and hemiwalker with continued difficulty with left foot clearance. Skilled PT is medically necessary in order to improve ambulation and transfers at home and to prepare pt. For D/C. Certification Period: 5/3/19-6/1/19 Katlyn Rodriguez, PT 5/3/2019 1:42 PM 
 
________________________________________________________________________ I certify that the above Therapy Services are being furnished while the patient is under my care. I agree with the treatment plan and certify that this therapy is necessary. [] I have read the above and request that my patient continue as recommended. [] I have read the above report and request that my patient continue therapy with the following changes/special instructions: _______________________________________ [] I have read the above report and request that my patient be discharged from therapy [de-identified] Signature:____________Date:_________TIME:________ 
 
[employer], Date and Time must be completed for valid certification ** Please sign and return to In [facility] [address] [facility] 
[phone] fax

## 2019-05-03 NOTE — PROGRESS NOTES
OT DAILY TREATMENT NOTE 10-18 Patient Name: Eric Lowe Date:5/3/2019 : 1950 [x]  Patient  Verified Payor: Luiz Vega / Plan: [facility] MEDICARE COMPLETE / Product Type: Managed Care Medicare / In time:1030  Out time:1115 Total Treatment Time (min): 45 Visit #: 1 of 8 Medicare/BCBS Only Total Timed Codes (min):  30 1:1 Treatment Time:  45  
 
Treatment Area: Muscle weakness (generalized) [M62.81] SUBJECTIVE Pain Level (0-10 scale): 0/10 Any medication changes, allergies to medications, adverse drug reactions, diagnosis change, or new procedure performed?: [] No    [x] Yes (see summary sheet for update) Subjective functional status/changes:   [] No changes reported On allergy meds now Be able to reach across body to touch right shoulder Want to work on ROM OBJECTIVE 15 min []Eval                  [x]Re-Eval  
 
 
30 min Therapeutic Exercise:  [] See flow sheet :  
Rationale: increase ROM to improve the patients ability to reach PROM with scapular mobs Floor stretch x 10 BUEs Dowel shoulder flex, pro ret x 10,  
ER stretch by OT With 
 [] TE 
 [] TA 
 [] neuro 
 [] other: Patient Education: [x] Review HEP [] Progressed/Changed HEP based on: Floor stretch 
[] positioning   [] body mechanics   [] transfers   [] heat/ice application  
[] Splint wear/care   [] Sensory re-education   [] scar management     
[] other:   
 
     
Other Objective/Functional Measures:  
 
Hand Strength: Gross Grasp 3pt Pinch Lateral Pinch Tip Pinch Right Left 20 (20) 9 (10) 10 (10) 7 (7)  
9 hole  38 (44) Flex 80 Ext 15 Abd 95 IR 55 ER 55 Elbow -40 Pain Level (0-10 scale) post treatment: 0/10 ASSESSMENT/Changes in Function: Improved fine motor skills, no change in strength of  or pinch, improved shoulder flexion strength, still limited in ROM Patient will continue to benefit from skilled OT services to modify and progress therapeutic interventions, address ROM deficits, address strength deficits, analyze and address soft tissue restrictions and instruct in home and community integration to attain remaining goals. []  See Plan of Care 
[]  See progress note/recertification 
[]  See Discharge Summary Progress towards goals / Updated goals: *1.  Patient will improve left hand in hand manipulation by at least 25% with improved praxis with left hand. to avoid accidents in home-not met, patient declines further intervention 2. Patient will be able to flex  left shoulder to 90 x 10 reps without pain  To place items in dresser/or on counter. progressing, to 80 x 10 reps 5/3/19 3. Patient will increase left hand  at least additional 5# for ease of opening containers and pulling up clothes. not met, declines further intervention 4. Patient will be able to fully wash own hair using left assist.met per patient report ** PLAN 
[]  Upgrade activities as tolerated     [x]  Continue plan of care 
[]  Update interventions per flow sheet      
[]  Discharge due to:_ 
[]  Other:_ Jaspreet Franco OTR/L 5/3/2019  10:27 AM 
 
Future Appointments Date Time Provider Wolf Peters 5/3/2019 10:30 AM Chace Brice OTR/L [facility]  
5/3/2019 11:30 AM Lucius Gonzalez PT [facility]  
5/7/2019 10:30 AM Vianca Gan PTA [facility]  
5/7/2019 11:15 AM Chace Brice OTR/L [facility]  
5/10/2019 11:15 AM Yamileth Brannon [id number] [facility]  
5/10/2019 12:00 PM Ericka Wilkinson PTA [facility]  
5/13/2019 10:30 AM Jeet Moore PT [facility]  
5/13/2019 11:00 AM Yamileth Brannon [id number] [facility]  
5/15/2019 11:00 AM Chace Brice, OTR/L [facility]  
5/15/2019 12:00 PM Lucius Gonzalez, PT [id number] [facility]  
5/20/2019 10:15 AM Yamileth Brannon [id number] [facility]  
5/20/2019 11:00 AM Jeet Moore, PT [facility]  
5/22/2019 10:30 AM Ericka Wilkinson, PTA [facility]  
 5/22/2019 11:00 AM Annelise Neely, OTR/L [facility]  
5/28/2019 10:00 AM Rudy Posey PTA [facility]  
5/28/2019 10:30 AM Francine Mahan [id number] [facility]  
5/30/2019 10:30 AM Liss Patiño, PT [facility]  
5/30/2019 11:15 AM Francine Mahan [facility]

## 2019-05-03 NOTE — PROGRESS NOTES
[facility] [address] [facility] 
[phone] fax Continued Plan of Care/ Re-certification for Occupational Therapy Services Patient name: Romy Caputo Start of Care: 10/1/18 Referral source: Rickey Adorno : 1950 Medical/Treatment Diagnosis: Muscle weakness (generalized) [M62.81] Payor: Nely Dos Santos / Plan: [facility] MEDICARE COMPLETE / Product Type: Managed Care Medicare /  Onset Date:18 Prior Hospitalization: see medical history Provider#: [id number] Medications: Verified on Patient Summary List   
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 Has not been seen since 19 The Plan of Care and following information is based on the patient's current status: 
 
 
Hand Strength: Gross Grasp 3pt Pinch Lateral Pinch Tip Pinch Right           
Left 20 (20) 9 (10) 10 (10) 7 (7)  
9 hole  38 (44) Flex     80 Ext       15 Abd     95 IR         55 ER       55 Elbow  -40 Abisai Raw will improve left hand in hand manipulation by at least 25% with improved praxis with left hand. to avoid accidents in home- 
Status at last note/certification:Poor in hand skills, frequent drops Current Status: not met Goal:2. Patient will be able to flex  left shoulder to 90 x 10 reps without pain  To place items in dresser/or on counter Status at last note/certification:Unable to flex to 90 and unable to do multiple trials without pain or fatigue Current Status: not met Not able to reach to 90, but can reach to 80 x 10 times [place] Raw will increase left hand  at least additional 5# for ease of opening containers and pulling up clothes Status at last note/certification: 64# Current Status: not met Marlyn Garcia will be able to fully wash own hair using left assist 
Status at last note/certification:Unable to reach head Current Status: met Key functional changes: Improved ability to reach head, flex shoulder repetitively Problems/ barriers to goal attainment: Missed visits Treatment Plan: Therapeutic exercise, Therapeutic activities, Physical agent/modality, Patient education and ADLs/IADLs Patient Goal (s) has been updated and includes: Be able to reach other shoulder, better [facility] Goals for this certification period to be accomplished in 4 weeks: 1. Patient will be independent and compliant with HEP for self stretching of shoulder and elbow. 2.  Patient will be able to horizontally adduct shoulder to bathe right side. 3.  Patient will be able to flex shoulder to at least 90 for putting things away at home. Frequency / Duration: Patient to be seen 2 times per week for 4 weeks: 
 
Assessment / Recommendations:Patient will benefit from continued skilled occupational therapy to increase upper extremity function and functional independence. Certification Period: 5/3/19-6/2/19 VESNA Sanderson/L 5/3/2019 12:58 PM 
 
________________________________________________________________________ I certify that the above Therapy Services are being furnished while the patient is under my care. I agree with the treatment plan and certify that this therapy is necessary. [de-identified] Signature:____________Date:_________TIME:________ 
 
[facility] [employer], Date and Time must be completed for valid certification ** Please sign and return to In [address]  [address] [facility] 
          [phone] fax

## 2019-05-03 NOTE — PROGRESS NOTES
PT DAILY TREATMENT NOTE 10-18 Patient Name: Chad Shrestha Date:5/3/2019 : 1950 [x]  Patient  Verified Payor: Aniya Herbert [facility] / Product Type: Managed Care Medicare / In time: 11:30  Out time: 12:00 Total Treatment Time (min): 30 Visit #: 1 of 1 Medicare/BCBS Only Total Timed Codes (min):  10 1:1 Treatment Time:  30 Treatment Area: Muscle weakness (generalized) [M62.81] SUBJECTIVE Pain Level (0-10 scale): 0/10 Any medication changes, allergies to medications, adverse drug reactions, diagnosis change, or new procedure performed?: [x] No    [] Yes (see summary sheet for update) Subjective functional status/changes:   [] No changes reported Pt. Reports she has been OBJECTIVE 20 min []Eval                  [x]Re-Eval  
 
 
10 min Therapeutic Exercise:  [x] See flow sheet :  
Rationale: increase ROM and increase strength to improve the patients ability to increase ease of ADLs With 
 [x] TE 
 [] TA 
 [] neuro 
 [] other: Patient Education: [x] Review HEP [] Progressed/Changed HEP based on:  
[] positioning   [] body mechanics   [] transfers   [] heat/ice application   
[] other:   
 
Other Objective/Functional Measures:  
Pt. Ambulated 4 feet with CGA and hemiwalker before fatigue She did better with left foot clearance with hemiwalker compared to walker She has poor weight shift to left side Pain Level (0-10 scale) post treatment:  0/10 ASSESSMENT/Changes in Function: see progress note Progress towards goals / Updated goals: 
See progress note PLAN 
[]  Upgrade activities as tolerated     [x]  Continue plan of care 
[]  Update interventions per flow sheet      
[]  Discharge due to:_ 
[]  Other:_ Socorro Thompson, PT 5/3/2019  11:34 AM 
 
Future Appointments Date Time Provider Wolf Peters 2019 10:30 AM Conchis Wilcox, IRVIN [facility]  
 5/7/2019 11:15 AM Sundra Lax, OTR/L [facility] [address]  
5/10/2019 11:15 AM Geovanna Roblesangelika [facility] [address]  
5/10/2019 12:00 PM Connor Carreno, PTA [facility] [address]  
5/13/2019 10:30 AM Johanna Nelsy, PT [facility] [address]  
5/13/2019 11:00 AM Geovannamanish Roblesalma MMCPTPB [address]  
5/15/2019 11:00 AM Sundra Lax, OTR/L [facility] [address]  
5/15/2019 12:00 PM Marquita Finch, PT [id number] [address]  
5/20/2019 10:15 AM Geovanna Travisalma [id number] [address]  
5/20/2019 11:00 AM Tingley Nelsy, PT [id number] [address]  
5/22/2019 10:30 AM Connor Carreno, PTA [facility] [address]  
5/22/2019 11:00 AM Sundra Lax, OTR/L [facility] [address]  
5/28/2019 10:00 AM Connor Carreno, PTA [facility] [address]  
5/28/2019 10:30 AM Geovanna ADAMSLH [address]  
5/30/2019 10:30 AM Johanna Nelsy, PT [facility] [address]  
5/30/2019 11:15 AM Geovanna Sanchez [facility] [address]

## 2019-05-07 ENCOUNTER — APPOINTMENT (OUTPATIENT)
Dept: PHYSICAL THERAPY | Age: 69
End: 2019-05-07
Payer: MEDICARE

## 2019-05-10 ENCOUNTER — HOSPITAL ENCOUNTER (OUTPATIENT)
Dept: PHYSICAL THERAPY | Age: 69
Discharge: HOME | End: 2019-05-10
Payer: MEDICARE

## 2019-05-10 PROCEDURE — 97110 THERAPEUTIC EXERCISES: CPT

## 2019-05-10 PROCEDURE — 97530 THERAPEUTIC ACTIVITIES: CPT

## 2019-05-10 PROCEDURE — 97016 VASOPNEUMATIC DEVICE THERAPY: CPT

## 2019-05-10 NOTE — PROGRESS NOTES
OT DAILY TREATMENT NOTE 10-18 Patient Name: Balbir Gutierrez Date:5/10/2019 : 1950 [x]  Patient  Verified Payor: Edilson Stock / [facility] / Product Type: Managed Care Medicare / In time:1115  Out time:1204 Total Treatment Time (min): 49 Visit #: 2 of 8 Medicare/BCBS Only Total Timed Codes (min):  45 1:1 Treatment Time:  49  
 
Treatment Area: Muscle weakness (generalized) [M62.81] SUBJECTIVE Pain Level (0-10 scale): 0/10 Any medication changes, allergies to medications, adverse drug reactions, diagnosis change, or new procedure performed?: [x] No    [] Yes (see summary sheet for update) Subjective functional status/changes:   [] No changes reported Pt reports stretched at home \"doing it yadira\" OBJECTIVE Modality rationale: decrease edema and increase tissue extensibility to improve the patients ability to move Left Wrist/Hand  
Min Type Additional Details  
 [] Estim:  []Unatt       []IFC  []Premod []Other:  []w/ice   []w/heat Position: Location:  
 [] Estim: []Att    []TENS instruct  []NMES []Other:  []w/US   []w/ice   []w/heat Position: Location:  
 []  Traction: [] Cervical       []Lumbar 
                     [] Prone          []Supine []Intermittent   []Continuous Lbs: 
[] before manual 
[] after manual  
 []  Ultrasound: []Continuous   [] Pulsed []1MHz   []3MHz Location: 
W/cm2:  
 []  Iontophoresis with dexamethasone Location: [] Take home patch  
[] In clinic  
 []  Ice     []  heat 
[]  Ice massage 
[]  Laser  
[]  Anodyne Position: Location:  
 []  Laser with stim 
[]  Other: Position: Location:  
10 [x]  Vasopneumatic Device- Wrist Pressure:       [x] lo [] med [] hi  
Temperature: [] lo [] med [x] hi  
[x] Skin assessment post-treatment:  [x]intact []redness- no adverse reaction 
  []redness  adverse reaction: 35 min Therapeutic Exercise:  [] See flow sheet :  
Rationale: increase ROM and increase strength to improve the patients ability to reach, lift PROM UE: 
 Shoulder flexion Shoulder flexion with elbow extension/flexion Shoulder abduction Shoulder abduction with elbow extension Scapular Protraction/Retraction Able to perform protraction with dowel, unable to perform rettraction Self stretch using BUE for scapular retraction Self stretch using BUE to perform Horizontal abduction/adduction Horizontal Abduction/Adduction with dowel BUE floor reach With 
 [] TE 
 [] TA 
 [] neuro 
 [] other: Patient Education: [x] Review HEP [] Progressed/Changed HEP based on:  
[] positioning   [] body mechanics   [] transfers   [] heat/ice application  
[] Splint wear/care   [] Sensory re-education   [] scar management     
[] other:   
 
     
Other Objective/Functional Measures:  
 
Swelling noted in Left dorsal forearm Unable to attempt ER/IR due increased tightness at shoulder level Pain Level (0-10 scale) post treatment: 0/10 ASSESSMENT/Changes in Function: Decreased swelling in forearm post modalities, increased ROM as session progressed Patient will continue to benefit from skilled OT services to modify and progress therapeutic interventions, address ROM deficits, address strength deficits, analyze and cue movement patterns and instruct in home and community integration to attain remaining goals. []  See Plan of Care 
[]  See progress note/recertification 
[]  See Discharge Summary Progress towards goals / Updated goals: 
Goals for this certification period to be accomplished in 4 weeks: 1. Patient will be independent and compliant with HEP for self stretching of shoulder and elbow. Progressing, Pt taught self stretch for scapular protraction/retraction, horizontal abduction/adduction 5/10/19 2.   Patient will be able to horizontally adduct shoulder to bathe right side. 
3.  Patient will be able to flex shoulder to at least 90 for putting things away at home.   
  
 
 
 
PLAN 
[]  Upgrade activities as tolerated     [x]  Continue plan of care 
[]  Update interventions per flow sheet      
[]  Discharge due to:_ 
[]  Other:_ Adam Lazaro MARIN/L 5/10/2019  11:15 AM 
 
Future Appointments Date Time Provider Wolf Peters 5/10/2019 12:00 PM Sarah Ceron PTA [facility]  
5/13/2019 10:30 AM Fay Alexander, PT [facility]  
5/13/2019 11:00 AM Queenie Velez [id number] [facility]  
5/15/2019 11:00 AM Queenie Velez [id number] [facility]  
5/15/2019 12:00 PM Randall Pal PT [facility]  
5/20/2019 10:15 AM Queenie Velez [id number] [facility]  
5/20/2019 11:00 AM Fay Alexander, COLIN [facility]  
5/22/2019 10:30 AM Sarah Ceron PTA [facility]  
5/22/2019 11:00 AM Queenie Velez [id number] [facility]  
5/28/2019 10:00 AM Sarah Ceron PTA [facility]  
5/28/2019 10:30 AM Queenie Velez [id number] [facility]  
5/30/2019 10:30 AM Fay Alexander, PT [facility]  
5/30/2019 11:15 AM Queenie Velez [facility]CPTP[facility]

## 2019-05-10 NOTE — PROGRESS NOTES
PT DAILY TREATMENT NOTE 10-18 Patient Name: Romy Caputo Date:5/10/2019 : 1950 [x]  Patient  Verified Payor: Nely Dos Santos / Plan: [facility] MEDICARE COMPLETE / Product Type: Managed Care Medicare / In time: 12:00 Out time: 12:40 Total Treatment Time (min): 40 Visit #: 2 of 10 Medicare/BCBS Only Total Timed Codes (min):  40 1:1 Treatment Time: 40 Treatment Area: Muscle weakness (generalized) [M62.81] SUBJECTIVE Pain Level (0-10 scale): 0/10 Any medication changes, allergies to medications, adverse drug reactions, diagnosis change, or new procedure performed?: [x] No    [] Yes (see summary sheet for update) Subjective functional status/changes:   [] No changes reported Per PT pt's sister wants her to be on the bike to limber up her hips and also work on lifting her leg into bed because she still needs help with it. Pt states increased swelling in her legs; unable to put on AFO. She wants to work on walking more with the hemiwalker. OBJECTIVE 10 min Therapeutic Exercise:  [x] See flow sheet :  
Rationale: increase ROM and increase strength to improve the patients ability to increase ease of ADLs 30 minutes of therapeutic activity to work on walking with hemiwalker for endurance and to work on sit to stands with increased left weight shift for improved independence at home With 
 [x] TE 
 [] TA 
 [] neuro 
 [] other: Patient Education: [x] Review HEP [] Progressed/Changed HEP based on:  
[] positioning   [] body mechanics   [] transfers   [] heat/ice application   
[] other:   
 
Other Objective/Functional Measures:  
30 feet of ambulation total with hemiwalker 20 feet with just CGA and 10 feet with Min A for left leg advancement due to fatigue Mod A for sit to stands with focus of left weight shift Educated on calf stretching due to PF contracture Educated to work on standing at sink pushing weight into left to help with body weight stretching into neutral 
B LE swelling; educated on compression stocking and elevation Pain Level (0-10 scale) post treatment:  0/10 ASSESSMENT/Changes in Function: Pt with progressing ambulation distances utilizing hemiwalker with CGA. She has decreased left weight shift with sit to stand transfers and a PF contracture on the left which limits use of the AFO. Will work on transfers for improved independence at home and ambulation with hemiwalker for improved safety and endurance for household ambulation. Goals for this certification period to be accomplished in 4 weeks: 1. Patient will improve FOTO score by 13 points in order to demonstrate a significant improvement in function. 2. Patient will ambulate 20 feet with CGA and hemiwalker in order to increase ease of ambulation at home 3. Patient will demonstrate understanding of her HEP in order to prepare for D/C.  PLAN [x]  Upgrade activities as tolerated     [x]  Continue plan of care 
[]  Update interventions per flow sheet      
[]  Discharge due to:_ 
[]  Other:_ Pavan Olsen PTA 5/10/2019  11:34 AM 
 
Future Appointments Date Time Provider Wolf Peters 5/10/2019 12:00 PM Nandini Rodriguez PTA [facility]CP[facility]  
5/13/2019 10:30 AM Freeman An PT [facility]  
5/13/2019 11:00 AM Scott Conrad ATEFJMN [facility]  
5/15/2019 11:00 AM Scott Conrad WWAHSAI [facility]  
5/15/2019 12:00 PM Keith Souza PT [facility]  
5/20/2019 10:15 AM Scott Conrad PNRWRDC [facility]  
5/20/2019 11:00 AM Freeman An PT [facility]  
5/22/2019 10:30 AM Nandini Rodriguez PTA [facility]  
5/22/2019 11:00 AM Scott Conrad DOVZOBJ [facility]  
5/28/2019 10:00 AM Nandini Rodriguez, PTA [facility]  
5/28/2019 10:30 AM Scott Conrad MCTCYNF [facility]  
5/30/2019 10:30 AM Freeman An PT [facility]  
5/30/2019 11:15 AM Scott Conrad [facility]CPTP[facility]

## 2019-05-13 ENCOUNTER — HOSPITAL ENCOUNTER (OUTPATIENT)
Dept: PHYSICAL THERAPY | Age: 69
Discharge: HOME | End: 2019-05-13
Payer: MEDICARE

## 2019-05-13 PROCEDURE — 97110 THERAPEUTIC EXERCISES: CPT

## 2019-05-13 PROCEDURE — 97530 THERAPEUTIC ACTIVITIES: CPT

## 2019-05-13 NOTE — PROGRESS NOTES
OT DAILY TREATMENT NOTE 10-18 Patient Name: Kalee Waller Date:2019 : 1950 [x]  Patient  Verified Payor: Contreras Chowdhury / Plan: [facility] MEDICARE COMPLETE / Product Type: Managed Care Medicare / In time:1100  Out time:1138 Total Treatment Time (min): 38 Visit #: 3 of 8 Medicare/BCBS Only Total Timed Codes (min):  38 1:1 Treatment Time:  45 Treatment Area: Muscle weakness (generalized) [M62.81] SUBJECTIVE Pain Level (0-10 scale): 0/10 Any medication changes, allergies to medications, adverse drug reactions, diagnosis change, or new procedure performed?: [x] No    [] Yes (see summary sheet for update) Subjective functional status/changes:   [] No changes reported I just need help washing my back OBJECTIVE 23 min Therapeutic Exercise:  [] See flow sheet :  
Rationale: increase ROM and increase strength to improve the patients ability to reach, lift B Horizontal Abduction/Adduction B Floor reach PROM: Shoulder flexion, Shoulder Abduction AAROM Shoulder Flexion 15 min Therapeutic Activity:  []  See flow sheet :  
Rationale: increase ROM and improve coordination  to improve the patients ability to reach Saebo Tree: Levels 1-3, increased table height to challenge patient due to inability to reach 4th level, using Left UE Simulated UB Bathing: Pt able to use Left to grasp tissue and reach to Right UE to wash from elbow down, Pt able to abduct left shoulder to allow Right UE to simulate washing With 
 [] TE 
 [x] TA 
 [] neuro 
 [] other: Patient Education: [x] Review HEP [] Progressed/Changed HEP based on:  
[] positioning   [] body mechanics   [] transfers   [] heat/ice application  
[] Splint wear/care   [] Sensory re-education   [] scar management     
[x] other: Long Handled Sponge Other Objective/Functional Measures:  
 
Cueing needed for movement patterns Pain Level (0-10 scale) post treatment: 0/10 ASSESSMENT/Changes in Function: ROM improving Patient will continue to benefit from skilled OT services to modify and progress therapeutic interventions, address ROM deficits, address strength deficits and instruct in home and community integration to attain remaining goals. []  See Plan of Care 
[]  See progress note/recertification 
[]  See Discharge Summary Progress towards goals / Updated goals: 
Goals for this certification period to be accomplished in 4 weeks: 1.  Patient will be independent and compliant with HEP for self stretching of shoulder and elbow. Progressing, Pt taught self stretch for scapular protraction/retraction, horizontal abduction/adduction 5/10/19 2.  Patient will be able to horizontally adduct shoulder to bathe right side. Progressing with in clinic activities 5/13/19 3.  Patient will be able to flex shoulder to at least 90 for putting things away at home.  Progressing with in clinic activities 5/13/19 
  
  
  
 
 
 
PLAN 
[]  Upgrade activities as tolerated     [x]  Continue plan of care 
[]  Update interventions per flow sheet      
[]  Discharge due to:_ 
[]  Other:_ MAICOL Amaya 5/13/2019  8:26 AM 
 
Future Appointments Date Time Provider Wolf Peters 5/13/2019 10:30 AM Stephanie Daryl, PT [facility]  
5/13/2019 11:00 AM [facility] [id number] [facility]  
5/15/2019 11:00 AM [facility] [id number] [facility]  
5/15/2019 12:00 PM Niurka Jarquin, PT [facility]  
5/20/2019 10:15 AM Ibrahim Tiara [id number] [facility]  
5/20/2019 11:00 AM Stephanie Drayl, PT [facility]  
5/22/2019 10:30 AM Opal Alarcon, IRVIN [facility]  
5/22/2019 11:00 AM [facility] [address] [facility]  
5/28/2019 10:00 AM Opal Alarcon PTA [facility]  
5/28/2019 10:30 AM Patrice Menjivar [id number] [facility]  
5/30/2019 10:30 AM Stephanie Brito PT [facility]  
5/30/2019 11:15 AM Patrice Menjivar [facility]CPTP[facility]

## 2019-05-13 NOTE — PROGRESS NOTES
PT DAILY TREATMENT NOTE 10-18 Patient Name: Chad Shrestha Date:2019 : 1950 [x]  Patient  Verified Payor: Aniya Herbert / [facility] / Product Type: Managed Care Medicare / In time:10:29  Out time:11:00 Total Treatment Time (min): 31 Visit #: 3 of 10 Medicare/BCBS Only Total Timed Codes (min):  31 1:1 Treatment Time:  25 Treatment Area: Muscle weakness (generalized) [M62.81] SUBJECTIVE Pain Level (0-10 scale): 0/10 Any medication changes, allergies to medications, adverse drug reactions, diagnosis change, or new procedure performed?: [x] No    [] Yes (see summary sheet for update) Subjective functional status/changes:   [] No changes reported Pt reports that she sits in her recliner a lot. She has been trying to get up and shift her weight to the left while standing with her walker at home. She lives with her daughter, but her daughter is away a lot d/t her busy work schedule. Marshall asked for her splint back, so she had to return it last week. She just saw her MD a couple days ago for the swelling, and she needs to get compression stockings. OBJECTIVE 31 min Therapeutic Activity:  [x]  See flow sheet :  
Rationale: increase ROM, increase strength, improve coordination, improve balance and increase proprioception  to improve the patients ability to improve ambulatory ability and ease of transfers/ADLs With 
 [] TE 
 [] TA 
 [] neuro 
 [] other: Patient Education: [x] Review HEP [] Progressed/Changed HEP based on:  
[] positioning   [] body mechanics   [] transfers   [] heat/ice application   
[] other:   
 
Other Objective/Functional Measures:  
 
Swelling evident BLE No tenderness, increased temp, redness, or pitting edema B calves Girth 5\" distal to patella: Right 42.5cm, Left 42.0cm Well's Clinical Prediction Rules indicate low risk of DVT (+)Paralysis, paresis, or immobilization of lower extremity. (Points: 1 )  
(+)Alternative diagnosis as likely as or more likely than DVT. (Points: -2 ) Category: Low Risk (3%). Risk score interpretation: Low probability of DVT. Left plantarflexor contracture evident, unable to achieve neutral PROM Performed sit<>stand from wheelchair to countertop 10x, CGA Unable to keep left LE posterior with sit to stand transfers, would move right LE posterior right before standing Performed standing WS to left 15 x 5\" with Keiry for weight shift to left, unable to place left heel fully on ground during WS Ambulated 13' and 5' with Keiry for increased WS to left with hemiwalker, further ambulation limited by pt fatigue Pt educated on benefits of WB with proprioception, strength, and tone - advised to perform sit to stand transfers and standing WS at countertop at home with her daughter Pain Level (0-10 scale) post treatment: 0/10 ASSESSMENT/Changes in Function:  
 
Pt is making slow progress in therapy. Findings are not consistent with DVT regarding swelling in BLE. Plantarflexor contractures as well as swelling are limiting use of AFO with ambulation, which is limiting ambulatory tolerance d/t decreased left foot clearance. Pt had to return dynasplint d/t lack of MD signatures when pt switched MDs. Therapist followed up with Marshall who said a new splint could be approved if pt's new MD were to sign off on this. Will discuss with patient next session. Will continue to address strength and static/dynamic balance deficits for improved ease/safety with household ambulation and ADLs.     
 
Patient will continue to benefit from skilled PT services to modify and progress therapeutic interventions, address functional mobility deficits, address ROM deficits, address strength deficits, analyze and address soft tissue restrictions, analyze and cue movement patterns, analyze and modify body mechanics/ergonomics, assess and modify postural abnormalities, address imbalance/dizziness and instruct in home and community integration to attain remaining goals. Progress towards goals / Updated goals: 1. Patient will improve FOTO score by 13 points in order to demonstrate a significant improvement in function.  
2. Patient will ambulate 20 feet with CGA and hemiwalker in order to increase ease of ambulation at home Not met. 15', 5' with hemiwalker Keiry 5/13/19 3. Patient will demonstrate understanding of her HEP in order to prepare for D/C.  
  
PLAN 
[]  Upgrade activities as tolerated     [x]  Continue plan of care 
[]  Update interventions per flow sheet      
[]  Discharge due to:_ 
[]  Other:_ Katarina Rodriguez PT 5/13/2019  10:31 AM 
 
Future Appointments Date Time Provider Wolf Peters 5/13/2019 11:00 AM Tiffany Fernandez [id number] [facility]  
5/15/2019 11:00 AM Tiffany Fernandez BPCFVDA [facility]  
5/15/2019 12:00 PM Fay Paul PT [facility]  
5/20/2019 10:15 AM Tiffany Fernandez KGKUSWS [facility]  
5/20/2019 11:00 AM Marc Murillo PT [facility]  
5/22/2019 10:30 AM Africa Velasquez PTA [facility]  
5/22/2019 11:00 AM [facility] PT [address] [facility]  
5/28/2019 10:00 AM Africa Velasquez PTA [facility]  
5/28/2019 10:30 AM Tiffany Fernandez [id number] [facility]  
5/30/2019 10:30 AM Marc Murillo PT [facility]  
5/30/2019 11:15 AM Tiffany Fernandez [facility]

## 2019-05-15 ENCOUNTER — HOSPITAL ENCOUNTER (OUTPATIENT)
Dept: PHYSICAL THERAPY | Age: 69
Discharge: HOME | End: 2019-05-15
Payer: MEDICARE

## 2019-05-15 PROCEDURE — 97530 THERAPEUTIC ACTIVITIES: CPT

## 2019-05-15 PROCEDURE — 97112 NEUROMUSCULAR REEDUCATION: CPT

## 2019-05-15 PROCEDURE — 97110 THERAPEUTIC EXERCISES: CPT

## 2019-05-15 NOTE — PROGRESS NOTES
OT DAILY TREATMENT NOTE 10-18 Patient Name: Nadia Gleason Date:5/15/2019 : 1950 [x]  Patient  Verified Payor: Felix Ogden / Plan: [facility] MEDIC[facility] COMPLETE / Product Type: Managed Care Medicare / In time: 1102  Out time:  Total Treatment Time (min): 45 Visit #: 4 of 8 Medicare/BCBS Only Total Timed Codes (min):  45 1:1 Treatment Time:  45  
 
Treatment Area: Muscle weakness (generalized) [M62.81] SUBJECTIVE Pain Level (0-10 scale): 0 Any medication changes, allergies to medications, adverse drug reactions, diagnosis change, or new procedure performed?: [x] No    [] Yes (see summary sheet for update) Subjective functional status/changes:   [x] No changes reported Pt reported she has been completing HEP at home and does not desire to assist in the kitchen at this time. OBJECTIVE 25 min Therapeutic Exercise:  [] See flow sheet :  
Rationale: increase ROM to improve the patients ability to use left UE functionally; reach/lift 10 reps each with 5 sec place and hold PROM Left UE:  
 Shoulder flexion/extension Horizontal abduction/adduction AAROM Left UE:  
 Shoulder abduction/adduction Scapula retraction/protraction with elbow flexion/extension using dowel Shoulder internal/external rotation 20 min Therapeutic Activity:  []  See flow sheet :  
Rationale: increase ROM and improve coordination  to improve the patients ability to functionally reach/lift with left UE Floor reach to grasp/ x4 cones x4 trials Pt picked up cones from floor level (with slight elevated assist from therapist) and placed on left side x2/right side x2 (crossing midline) table top Saebo Tree using left UE: Levels 1-3, increased table height to challenge as pt unable to reach highest (4th) level With 
 [x] TE 
 [] TA 
 [] neuro 
 [] other: Patient Education: [x] Review HEP [] Progressed/Changed HEP based on: [] positioning   [] body mechanics   [] transfers   [x] heat/ice application  
[] Splint wear/care   [] Sensory re-education   [] scar management     
[] other:   
 
     
Other Objective/Functional Measures:  
Increased ROM to left UE with functional reach low/high given extended place and hold stretch Pain Level (0-10 scale) post treatment: 0 
 
ASSESSMENT/Changes in Function:  
Pt is progressing with HEP and functional use of left UE. Patient will continue to benefit from skilled OT services to address ROM deficits and address strength deficits to attain remaining goals. [x]  See Plan of Care 
[]  See progress note/recertification 
[]  See Discharge Summary Progress towards goals / Updated goals: 
Goals for this certification period to be accomplished in 4 weeks: 1.  Patient will be independent and compliant with HEP for self stretching of shoulder and elbow. Progressing, Pt taught self stretch for scapular protraction/retraction, horizontal abduction/adduction 5/10/19 2.  Patient will be able to horizontally adduct shoulder to bathe right side. Progressing with in clinic activities 5/13/19 3.  Patient will be able to flex shoulder to at least 90 for putting things away at home.  Progressing with in clinic activities 5/13/19 PLAN 
[]  Upgrade activities as tolerated     [x]  Continue plan of care 
[]  Update interventions per flow sheet      
[]  Discharge due to:_ 
[]  Other:_ TOMAS Erwin/L 5/15/2019  11:47 AM 
 
Future Appointments Date Time Provider Wolf Peters 5/15/2019 11:00 AM [facility] PT [address] [facility]  
5/15/2019 12:00 PM Enedina Lea, PT [id number] [facility]  
5/20/2019 10:15 AM Francine Mahan [id number] [facility]  
5/20/2019 11:00 AM Liss Patiño, PT [facility]  
5/22/2019 10:30 AM Rudy Posey PTA [facility]  
5/22/2019 11:00 AM [facility] PT [address] [facility]  
5/28/2019 10:00 AM Rudy Posey PTA [facility]  
 5/28/2019 10:30 AM Juliet Davis [id number] [facility]  
5/30/2019 10:30 AM Abhilash Gonzalez PT [facility]  
5/30/2019 11:15 AM Juliet Davis [facility]CPTPB [facility]

## 2019-05-15 NOTE — PROGRESS NOTES
PT DAILY TREATMENT NOTE 10-18 Patient Name: Darron Randolph Date:5/15/2019 : 1950 [x]  Patient  Verified Payor: Kamlesh Gonzalez / [facility] / Product Type: Managed Care Medicare / In time: 12:00  Out time: 12:30 Total Treatment Time (min): 30 Visit #: 4 of 10 Medicare/BCBS Only Total Timed Codes (min):  30 1:1 Treatment Time:  30 Treatment Area: Muscle weakness (generalized) [M62.81] SUBJECTIVE Pain Level (0-10 scale): 0/10 Any medication changes, allergies to medications, adverse drug reactions, diagnosis change, or new procedure performed?: [x] No    [] Yes (see summary sheet for update) Subjective functional status/changes:   [] No changes reported Pt. Reports she is doing pretty good today. OBJECTIVE 30 min Neuromuscular Re-education:  [x]  See flow sheet :  
Rationale: increase strength, improve coordination and improve balance  to improve the patients ability to increase ease of ambulation With 
 [x] TE 
 [] TA 
 [] neuro 
 [] other: Patient Education: [x] Review HEP [] Progressed/Changed HEP based on:  
[] positioning   [] body mechanics   [] transfers   [] heat/ice application   
[] other:   
 
Other Objective/Functional Measures:  
Pt. Required facilitation for sit to stands, weight shifts, and bridges She ambulated 17 feet with hemiwalker with CGA before she stopped due to fatigue Pt. Continues to have poor weight shift to left side After cues pt. Performed a sit to supine transfer with SBA Pain Level (0-10 scale) post treatment:  0/10 ASSESSMENT/Changes in Function:  Pt. Is making limited progress towards goals. She continues to have significant limitations with ambulation secondary to left hypertonicity and decreased strength. She has improving sit to supine transfer on table and was educated on practicing at home.   
 
Patient will continue to benefit from skilled PT services to modify and progress therapeutic interventions, address functional mobility deficits, address ROM deficits, address strength deficits, analyze and address soft tissue restrictions, analyze and cue movement patterns, analyze and modify body mechanics/ergonomics and assess and modify postural abnormalities to attain remaining goals. Progress towards goals / Updated goals: 1. Patient will improve FOTO score by 13 points in order to demonstrate a significant improvement in function.  
2. Patient will ambulate 20 feet with CGA and hemiwalker in order to increase ease of ambulation at home Progressin feet with CGA and hemiwalker (5/15/19) 3. Patient will demonstrate understanding of her HEP in order to prepare for D/C.  PLAN 
[]  Upgrade activities as tolerated     [x]  Continue plan of care 
[]  Update interventions per flow sheet      
[]  Discharge due to:_ 
[]  Other:_ Eddie Pate PT 5/15/2019  1:38 PM 
 
Future Appointments Date Time Provider Wolf Peters 2019 10:15 AM Faizan Jackson [id number] [facility]  
2019 11:00 AM Lei Lynn PT [facility]  
2019 10:30 AM Eva Puckett PTA [facility]  
2019 11:00 AM [facility] [address] [facility]  
2019 10:00 AM Eva Puckett PTA [facility]  
2019 10:30 AM Faizan Jackson [id number] [facility]  
2019 10:30 AM Lei Lynn PT [facility]  
2019 11:15 AM Faizan Jackson [facility]CPTPB [facility]

## 2019-05-20 ENCOUNTER — HOSPITAL ENCOUNTER (OUTPATIENT)
Dept: PHYSICAL THERAPY | Age: 69
Discharge: HOME | End: 2019-05-20
Payer: MEDICARE

## 2019-05-20 PROCEDURE — 97110 THERAPEUTIC EXERCISES: CPT

## 2019-05-20 PROCEDURE — 97112 NEUROMUSCULAR REEDUCATION: CPT

## 2019-05-20 PROCEDURE — 97116 GAIT TRAINING THERAPY: CPT

## 2019-05-20 NOTE — PROGRESS NOTES
PT DAILY TREATMENT NOTE 10-18 Patient Name: Shawnee Alvarado Date:2019 : 1950 [x]  Patient  Verified Payor: Monique Paez / Plan: [facility] MEDICARE COMPLETE / Product Type: Managed Care Medicare / In time:11:04  Out time:11:48 Total Treatment Time (min): 44 Visit #: 5 of 10 Medicare/BCBS Only Total Timed Codes (min):  44 1:1 Treatment Time:  44 Treatment Area: Muscle weakness (generalized) [M62.81] SUBJECTIVE Pain Level (0-10 scale): 0/10 Any medication changes, allergies to medications, adverse drug reactions, diagnosis change, or new procedure performed?: [x] No    [] Yes (see summary sheet for update) Subjective functional status/changes:   [] No changes reported Pt reports that she did a lot of standing and WS in her standard walker with her daughter over the weekend. OBJECTIVE 8 min Therapeutic Exercise:  [] See flow sheet : LAQ, manual hamstring/gastroc stretch Rationale: increase ROM and increase strength to improve the patients ability to improve ambulatory ability 26 min Neuromuscular Re-education:  []  See flow sheet : standing WS interventions in parallel bars Rationale: increase ROM, increase strength, improve coordination, improve balance and increase proprioception  to improve the patients ability to improve ambulatory ability 10 min Gait Training: 15' and 5' with hemiwalker and Keiry, 5' with RW and Keiry Rationale: to improve ease/safety with household ambulation With 
 [] TE 
 [] TA 
 [] neuro 
 [] other: Patient Education: [x] Review HEP [] Progressed/Changed HEP based on:  
[] positioning   [] body mechanics   [] transfers   [] heat/ice application   
[] other:   
 
Other Objective/Functional Measures:  
 
Performed standing daigonal WS in parallel bars with slightly staggered stance with right LE and left LE posterior: 
Used mirror for visual feedback Tactile cues at hips for increased WS to left and posterior pelvic tilt Tactile cues at left knee for slight knee flexion during WB Improved form with tactile cuing and mirror for visual feedback Performed reaching with BUE to tap therapist's hands in parallel bars while therapist provided cuing for increased WS to left Ambulated 15' and 5' with hemiwalker with Keiry for WS to left and intermittently for left foot clearance Trialed ambulation with RW to attempt to increase WS to left and increase left foot clearance, no significant change with ambulation with RW compared to hemiwalker, pt requested to continue using hemiwalker Pain Level (0-10 scale) post treatment: 0/10 ASSESSMENT/Changes in Function:  
 
Pt is making slow/limited progress towards updated goals in therapy. She continues to present with significant tone in left LE and plantarflexor contracture. Anterior pelvic tilt with tone on left is limiting pt's ability to WS left and keep COG over YAEL with equal WB. Discussed possible script for new dynasplint to address plantarflexor contracture to optimize foot clearance and left LE WB during ambulation and pt agreed. Pt gave therapist verbal permission to have Dr. Barbara Hoang contacted regarding dynaplint. Will continue to address strength, ROM, tone and functional mobility deficits in order to improve ease/safety with daily tasks and improve QOL. Patient will continue to benefit from skilled PT services to modify and progress therapeutic interventions, address functional mobility deficits, address ROM deficits, address strength deficits, analyze and address soft tissue restrictions, analyze and cue movement patterns, analyze and modify body mechanics/ergonomics, assess and modify postural abnormalities, address imbalance/dizziness and instruct in home and community integration to attain remaining goals. Progress towards goals / Updated goals: 1. Patient will improve FOTO score by 13 points in order to demonstrate a significant improvement in function.  
2. Patient will ambulate 20 feet with CGA and hemiwalker in order to increase ease of ambulation at home  
Progressin feet with CGA and hemiwalker (5/15/19) Not met. 15' with hemiwalker and Keiry 19 3. Patient will demonstrate understanding of her HEP in order to prepare for D/C.  PLAN 
[]  Upgrade activities as tolerated     [x]  Continue plan of care 
[]  Update interventions per flow sheet      
[]  Discharge due to:_ 
[]  Other:_ Peter Aernas, PT 2019  11:06 AM 
 
Future Appointments Date Time Provider Wolf Peters 2019 10:30 AM Ericka Wilkinson PTA [facility]  
2019 11:00 AM Davina Da Silva SJVBPRW [facility]  
2019 10:00 AM Ericka Wilkinson PTA [facility]  
2019 10:30 AM Yamileth Brannon FNYRDJY [facility]  
2019 10:30 AM Jeet Moore PT [facility]  
2019 11:15 AM Yamileth Brannon [facility]

## 2019-05-20 NOTE — PROGRESS NOTES
OT DAILY TREATMENT NOTE 10-18 Patient Name: Romy Caputo Date:2019 : 1950 [x]  Patient  Verified Payor: [address] [place] [address] / Plan: [facility] MEDICARE COMPLETE / Product Type: Managed Care Medicare / In time:1120  Out time:1104 Total Treatment Time (min): 44 Visit #: 5 of 8 Medicare/BCBS Only Total Timed Codes (min):  40 1:1 Treatment Time:  40 Treatment Area: Muscle weakness (generalized) [M62.81] SUBJECTIVE Pain Level (0-10 scale): 1/10 Any medication changes, allergies to medications, adverse drug reactions, diagnosis change, or new procedure performed?: [x] No    [] Yes (see summary sheet for update) Subjective functional status/changes:   [] No changes reported Pt reports small pain in Left side OBJECTIVE 40 min Therapeutic Exercise:  [] See flow sheet :  
Rationale: increase ROM and increase strength to improve the patients ability to , reach Pullets: Flexion, Abduction 15x each Left Horizontal Abduction/Adduction to reach for object 1 in pegs: Placed on Left Side, Pt used Left UE to remove pegs and place into bucket placed on Right side 30x Pegs placed on Right side for removal and placed into bucket placed on Left side 30x Soft Theraputty: manipulated with Left hand to reveal/remove 1/4 in pegs at table level 10/10 With 
 [] TE 
 [] TA 
 [] neuro 
 [] other: Patient Education: [x] Review HEP [] Progressed/Changed HEP based on:  
[] positioning   [] body mechanics   [] transfers   [] heat/ice application  
[] Splint wear/care   [] Sensory re-education   [] scar management     
[] other:   
 
     
Other Objective/Functional Measures:  
 
Increased time required for peg removal, cueing to breath required. Frequent rest breaks required . Pain Level (0-10 scale) post treatment: 0/10 ASSESSMENT/Changes in Function: Decreased pain post stretch Patient will continue to benefit from skilled OT services to modify and progress therapeutic interventions, address ROM deficits, address strength deficits and instruct in home and community integration to attain remaining goals. []  See Plan of Care 
[]  See progress note/recertification 
[]  See Discharge Summary Progress towards goals / Updated goals: 
Goals for this certification period to be accomplished in 4 weeks: 1.  Patient will be independent and compliant with HEP for self stretching of shoulder and elbow. Progressing, Pt taught self stretch for scapular protraction/retraction, horizontal abduction/adduction 5/10/19 2.  Patient will be able to horizontally adduct shoulder to bathe right side. Progressing with in clinic activities 5/20/19 3.  Patient will be able to flex shoulder to at least 90 for putting things away at home.  Progressing with in clinic activities 5/20/19 
  
 
 
 
PLAN 
[]  Upgrade activities as tolerated     [x]  Continue plan of care 
[]  Update interventions per flow sheet      
[]  Discharge due to:_ 
[]  Other:_ MAICOL Leach 5/20/2019  11:42 AM 
 
Future Appointments Date Time Provider Wolf Peters 5/22/2019 10:30 AM Davina Cruz PTA [facility] [address]  
5/22/2019 11:00 AM Carolina PINZON [address]  
5/28/2019 10:00 AM Davina Cruz PTA [facility] [address]  
5/28/2019 10:30 AM Alice MCKINNEY [address]  
5/30/2019 10:30 AM Vin Hickey PT [facility] [address]  
5/30/2019 11:15 AM Alice Metcalf [facility] [address]

## 2019-05-22 ENCOUNTER — APPOINTMENT (OUTPATIENT)
Dept: PHYSICAL THERAPY | Age: 69
End: 2019-05-22
Payer: MEDICARE

## 2019-05-28 ENCOUNTER — HOSPITAL ENCOUNTER (OUTPATIENT)
Dept: PHYSICAL THERAPY | Age: 69
Discharge: HOME | End: 2019-05-28
Payer: MEDICARE

## 2019-05-28 PROCEDURE — 97110 THERAPEUTIC EXERCISES: CPT

## 2019-05-28 PROCEDURE — 97530 THERAPEUTIC ACTIVITIES: CPT

## 2019-05-28 PROCEDURE — 97112 NEUROMUSCULAR REEDUCATION: CPT

## 2019-05-28 NOTE — PROGRESS NOTES
PT DAILY TREATMENT NOTE 10-18    Patient Name: Sergio Tolentino  Date:2019  : 1950  [x]  Patient  Verified  Payor: [employer] MEDICARE COMPLETE / Plan: [address] / Product Type: Managed Care Medicare /    In time:9:58  Out time: 10:30  Total Treatment Time (min): 32  Visit #: 5 of 8     Medicare/BCBS Only   Total Timed Codes (min):  32 1:1 Treatment Time:  32       Treatment Area: Muscle weakness (generalized) [M62.81]    SUBJECTIVE  Pain Level (0-10 scale): 0/10  Any medication changes, allergies to medications, adverse drug reactions, diagnosis change, or new procedure performed?: [x] No    [] Yes (see summary sheet for update)  Subjective functional status/changes:   [] No changes reported       OBJECTIVE      22 min Therapeutic Exercise:  [] See flow sheet :     Rationale: increase ROM and increase strength to improve the patients ability to improve ambulatory ability       10 min Neuromuscular Re-education:  []  See flow sheet : standing WS interventions in parallel bars    Rationale: increase ROM, increase strength, improve coordination, improve balance and increase proprioception  to improve the patients ability to improve ambulatory ability             With   [] TE   [] TA   [] neuro   [] other: Patient Education: [x] Review HEP    [] Progressed/Changed HEP based on:   [] positioning   [] body mechanics   [] transfers   [] heat/ice application    [] other:      Other Objective/Functional Measures:   BP: 163/81  Discussed possibility of electric w/c for home use; educated pt to talk to MD about it as well to see about qualification  Increased extensor tone today working on sit to stands with poor left weight shift  Continues with PF contracture on the left  Worked on weight shifting in the // with B UE and lift of right hand for 5-10 seconds to reduce reliance on UE for balance  Discussed future sessions working on independence with home exercises; pt states most likely only surface she will have to work on will be standard walker to practice all that she has learned in therapy    Pain Level (0-10 scale) post treatment: 0/10    ASSESSMENT/Changes in Function: Pt making slow progress towards goals. She continues to have extensor tone in standing in the left leg limiting stability of the left ankle. She has poor balance without UE assist. Will work on using standard walker in upcoming sessions to simulate practice at home independently for pt to continue with progression at home without assistance. Progress towards goals / Updated goals:  1. Patient will improve FOTO score by 13 points in order to demonstrate a significant improvement in function.   2. Patient will ambulate 20 feet with CGA and hemiwalker in order to increase ease of ambulation at home   Progressin feet with CGA and hemiwalker (5/15/19) Not met. 15' with hemiwalker and Keiry 19  3.  Patient will demonstrate understanding of her HEP in order to prepare for D/C.       PLAN  [x]  Upgrade activities as tolerated     [x]  Continue plan of care  []  Update interventions per flow sheet       []  Discharge due to:_  []  Other:_      No Rodriguez PTA 2019  11:06 AM    Future Appointments   Date Time Provider Wolf Peters   2019 10:00 AM Leah Rushing PTA [facility]   2019 10:30 AM Danyelle GARCIAJPZOO [facility]   2019 10:30 AM Pepe Altman, PT [id number] [facility]   2019 11:15 AM Danyelle CRUZELUQO [facility]   6/3/2019 10:30 AM Leah Rushing PTA [facility]   6/3/2019 11:00 AM Danyelle Escalante DCEGRKC [facility]   2019 12:30 PM Leah Rushing, PTA [facility]   2019  1:00 PM VESNA Bustamante/L [facility]   6/10/2019 10:30 AM Pepe Altman, PT DZMLUSG [facility]   6/10/2019 11:00 AM Jaylon Jasmine OTR/L [facility]   2019 10:00 AM Leah Rushing, PTA [facility]   2019 11:00 AM Danyelle Escalante QPZUMME [facility]   2019 10:30 AM Leah Seeds, PTA [facility] 6/17/2019 11:00 AM Milla Peguero TWDJMQA [facility]   6/19/2019 10:15 AM Rod Sow, OTR/L [facility]   6/19/2019 11:30 AM Juliano Rudolph PTA [facility]   6/24/2019 10:30 AM Luis Miguel Dee, PT KIJSYVY [facility]   6/24/2019 11:00 AM Milla Housemarcos VMFWVFM [facility]   6/26/2019 10:30 AM Juliano Rudolph PTA [facility]   6/26/2019 11:00 AM Rod Sow, OTR/L [facility]

## 2019-05-28 NOTE — PROGRESS NOTES
OT DAILY TREATMENT NOTE 10-18    Patient Name: Marcos Hatch  Date:2019  : 1950  [x]  Patient  Verified  Payor: [employer] MEDICARE COMPLETE / [facility]: [facility] / Product Type: Managed Care Medicare /    In RTL  Out time:1117  Total Treatment Time (min): 45  Visit #: 6 of 8    Medicare/BCBS Only   Total Timed Codes (min):  45 1:1 Treatment Time:  45     Treatment Area: Muscle weakness (generalized) [M62.81]    SUBJECTIVE  Pain Level (0-10 scale): 0/10  Any medication changes, allergies to medications, adverse drug reactions, diagnosis change, or new procedure performed?: [x] No    [] Yes (see summary sheet for update)  Subjective functional status/changes:   [] No changes reported  This is a good stretch    OBJECTIVE    21 min Therapeutic Exercise:  [] See flow sheet :   Rationale: increase ROM and increase strength to improve the patients ability to , reach    Medium putty: 10/10  Lg putty with L Tool   Medium Putty HEP    24 min Therapeutic Activity:  []  See flow sheet :   Rationale: increase ROM and improve coordination  to improve the patients ability to reach    Using Left UE, Pt reached across midline to grab 1 in pegs on right side, and then placed 1 in pegs into resistive pegboard placed at midline and distance to encourage reach 60x        With   [] TE   [] TA   [] neuro   [] other: Patient Education: [x] Review HEP    [] Progressed/Changed HEP based on:   [] positioning   [] body mechanics   [] transfers   [] heat/ice application   [] Splint wear/care   [] Sensory re-education   [] scar management      [] other:            Other Objective/Functional Measures:     2/60 drops with 1 in pegs     Pain Level (0-10 scale) post treatment: 0/10    ASSESSMENT/Changes in Function: Reach improving     Patient will continue to benefit from skilled OT services to modify and progress therapeutic interventions, address ROM deficits, address strength deficits and instruct in home and community integration to attain remaining goals. []  See Plan of Care  []  See progress note/recertification  []  See Discharge Summary         Goals for this certification period to be accomplished in 4 weeks:  1.  Patient will be independent and compliant with HEP for self stretching of shoulder and elbow. Progressing, Pt taught self stretch for scapular protraction/retraction, horizontal abduction/adduction 5/10/19  2.  Patient will be able to horizontally adduct shoulder to bathe right side.  Progressing with in clinic activities 5/28/19  3.  Patient will be able to flex shoulder to at least 90 for putting things away at home.  Progressing with in clinic activities 5/28/19      PLAN  []  Upgrade activities as tolerated     [x]  Continue plan of care  []  Update interventions per flow sheet       []  Discharge due to:_  []  Other:_      MAICOL Florian 5/28/2019  9:27 AM    Future Appointments   Date Time Provider Wolf Peters   5/28/2019 10:00 AM Geoffrey Lacy PTA [facility]   5/28/2019 10:30 AM Robe Feliz [id number] [facility]   5/30/2019 10:30 AM Viridiana Tate, PT [facility]   5/30/2019 11:15 AM Robe Feliz FKBYDFF [facility]   6/3/2019 10:30 AM Geoffrey Lacy PTA [facility]   6/3/2019 11:00 AM Robe Feliz UUSFDTK [facility]   6/5/2019 12:30 PM Geoffrey Lacy PTA [facility]   6/5/2019  1:00 PM Levell Hailey, OTR/L [facility]   6/10/2019 10:30 AM Viridiana Tate, PT [facility]   6/10/2019 11:00 AM Levell Hailey, OTR/L [facility]   6/12/2019 10:00 AM Geoffrey Lacy, PTA [facility]   6/12/2019 11:00 AM Robe Feliz TRQNNVA [facility]   6/17/2019 10:30 AM Geoffrey Lacy, PTA [facility]   6/17/2019 11:00 AM Robe Feliz CPRMUFG [facility]   6/19/2019 10:15 AM Levell Hailey, OTR/L [facility]   6/19/2019 11:30 AM Geoffrey Lacy, PTA [facility]   6/24/2019 10:30 AM Viridiana Tate, PT [facility]   6/24/2019 11:00 AM Robe Feliz EVFHECG [facility]   6/26/2019 10:30 AM [place] [facility]CPTP[facility]   6/26/2019 11:00 AM Geronimo Bacon [facility]

## 2019-05-30 ENCOUNTER — APPOINTMENT (OUTPATIENT)
Dept: PHYSICAL THERAPY | Age: 69
End: 2019-05-30
Payer: MEDICARE

## 2019-06-03 ENCOUNTER — HOSPITAL ENCOUNTER (OUTPATIENT)
Dept: PHYSICAL THERAPY | Age: 69
Discharge: HOME | End: 2019-06-03
Payer: MEDICARE

## 2019-06-03 ENCOUNTER — APPOINTMENT (OUTPATIENT)
Dept: PHYSICAL THERAPY | Age: 69
End: 2019-06-03
Payer: MEDICARE

## 2019-06-03 PROCEDURE — 97110 THERAPEUTIC EXERCISES: CPT

## 2019-06-03 NOTE — PROGRESS NOTES
PT DAILY TREATMENT NOTE 10-18    Patient Name: Kalee Waller  Date:6/3/2019  : 1950  [x]  Patient  Verified  Payor: [facility] MEDICARE COMPLETE / Plan: [facility] MEDICARE COMPLETE / Product Type: Managed Care Medicare /    In time: 10:29 Out time: 11:09  Total Treatment Time (min): 40  Visit #: 6 of 8     Medicare/BCBS Only   Total Timed Codes (min):  40 1:1 Treatment Time:  40       Treatment Area: Muscle weakness (generalized) [M62.81]    SUBJECTIVE  Pain Level (0-10 scale): 0/10  Any medication changes, allergies to medications, adverse drug reactions, diagnosis change, or new procedure performed?: [x] No    [] Yes (see summary sheet for update)  Subjective functional status/changes:   [] No changes reported   Pt reports the apartment is small so she doesn't even use the w/c inside. She notes that she generally stays on the recliner sofa. She doesn't have enough room to put the walker or w/c anywhere to practice standing so the best bet would be standing from the sofa to a standard walker to practice. She is interested in the dynasplint again. She doesn't feel she would have room for an electric w/c inside.      OBJECTIVE      40 min Therapeutic Exercise:  [] See flow sheet :     Rationale: increase ROM and increase strength to improve the patients ability to improve ambulatory ability           With   [] TE   [] TA   [] neuro   [] other: Patient Education: [x] Review HEP    [] Progressed/Changed HEP based on:   [] positioning   [] body mechanics   [] transfers   [] heat/ice application    [] other:      Other Objective/Functional Measures:   PF contracture limiting standing ability on left LE  Practiced sitting and standing exercises in standard walker to simulate what she could do at home safely and independently  BP: 181/88  HR: 68 bpm  After session BP: 172/88  HR: 68 bpm  Will follow up with new MD regarding dynasplint  Discussed importance of building standing endurance and left hip strength for functional use      Pain Level (0-10 scale) post treatment: 0/10    ASSESSMENT/Changes in Function: Pt is making slow progress towards goals in therapy. She continues to have left LE weakness, extensor synergy pattern and PF contracture limiting use of AFO and ability to put more weight on left for strengthening. She has limited space in the apartment to work on walking and doesn't have someone to assist her enough with completing walking. She doesn't have space for an electric w/c or her regular w/c inside so spends a majority of time in sitting. Will progress independence with exercises that she can do on her own to help with transfers and ambulation to further improve safety and independence within the home. Progress towards goals / Updated goals:  1. Patient will improve FOTO score by 13 points in order to demonstrate a significant improvement in function.   2. Patient will ambulate 20 feet with CGA and hemiwalker in order to increase ease of ambulation at home   Progressin feet with CGA and hemiwalker (5/15/19) Not met. 15' with hemiwalker and Keiry 19  3.  Patient will demonstrate understanding of her HEP in order to prepare for D/C.       PLAN  [x]  Upgrade activities as tolerated     [x]  Continue plan of care  []  Update interventions per flow sheet       []  Discharge due to:_  []  Other:_      Adonay Wilkes PTA 6/3/2019  11:06 AM    Future Appointments   Date Time Provider Wolf Peters   6/3/2019 10:30 AM Elijah Gravel, PTA [facility]   2019 12:30 PM Elijah Gravel, PTA [facility]   2019  1:00 PM Rita Velez, OTR/L [facility]   6/10/2019 10:30 AM COLIN Fuller [facility]   6/10/2019 11:00 AM Rita Velez, OTR/L [facility]   2019 10:00 AM Elijah Gravel, PTA [facility]   2019 11:00 AM Mac Conley KNBBTSL [facility]   2019 10:30 AM Elijah Gravel, PTA [facility]   2019 11:00 AM Mac Conley UMPNAEJ [facility]   2019 10:15 AM Dean Johnston, Marca Hatchet, OTR/L [facility]   6/19/2019 11:30 AM Patricia Lamas, PTA [facility]   6/24/2019 10:30 AM Katiana Torres, PT TPZXCQL [facility]   6/24/2019 11:00 AM Clarisse Arellano KRCFQPF [facility]   6/26/2019 10:30 AM Patricia Lamas, PTA [facility]   6/26/2019 11:00 AM Aurea Olvera, OTR/L [facility]

## 2019-06-05 ENCOUNTER — HOSPITAL ENCOUNTER (OUTPATIENT)
Dept: PHYSICAL THERAPY | Age: 69
Discharge: HOME | End: 2019-06-05
Payer: MEDICARE

## 2019-06-05 PROCEDURE — 97110 THERAPEUTIC EXERCISES: CPT

## 2019-06-05 NOTE — PROGRESS NOTES
[facility]  [employer] [facility]  [address] [place] [address]  [phone] fax    Continued Plan of Care/ Re-certification for Occupational Therapy Services    Patient name: Marcos Hatch Start of Care: 10/1/18   Referral source: Manju Ferrara., * : 1950   Medical/Treatment Diagnosis: Muscle weakness (generalized) [M62.81]  Payor: Gomeztarahmelvin Pair / Plan: 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 Status at last note/certification:Needed update  Current Status: not met Progressing, Pt taught self stretch for scapular protraction/retraction, horizontal abduction/adduction    Goal:2.  Patient will be able to horizontally adduct shoulder to bathe right side  Status at last note/certification:Unable  Current Status: not metProgressing with in clinic activities 19    Moraima Kaur will be able to flex shoulder to at least 80 for putting things away at home  Status at last note/certification:  Current Status: not met Progressing with in clinic activities     Key functional changes: Improving ability to reach in all planes with left upper extremity.   Formal reassessment not done due ot cancelled visit      Problems/ barriers to goal attainment: none     Treatment Plan: Therapeutic exercise, Therapeutic activities, Physical agent/modality, Patient education and ADLs/IADLs      Patient Goal (s) has been updated and includes: reach better, looser     Goals for this certification period to be accomplished in 2 treatments:  *1.  Patient will be independent and compliant with HEP for self stretching of shoulder and elbow.   2.  Patient will be able to horizontally adduct shoulder to bathe right side. 3.  Patient will be able to flex shoulder to at least 90 for putting things away at home    **    Frequency / Duration: Patient to be seen 2 times per week for 2 treatments:    Assessment / Recommendations:Patient will benefit from continued skilled occupational therapy to increase upper extremity function and functional independence. Certification Period: 6/2/19-7/1/19    VESNA Valencia/L 6/5/2019 1:50 PM    ________________________________________________________________________  I certify that the above Therapy Services are being furnished while the patient is under my care. I agree with the treatment plan and certify that this therapy is necessary.       Physician's Signature:____________Date:_________TIME:________    ** Signature, Date and Time must be completed for valid certification **      Please sign and return to [facility]  [employer] [facility]  [address] [place] [address]            [phone] fax

## 2019-06-05 NOTE — PROGRESS NOTES
OT DAILY TREATMENT NOTE 10-18    Patient Name: Jade Mcbride  Date:2019  : 1950  [x]  Patient  Verified  Payor: Joon Valencia / Plan: [address] / Product Type: Managed Care Medicare /    In time:100  Out time:145  Total Treatment Time (min): 45  Visit #: 7 of 8    Medicare/BCBS Only   Total Timed Codes (min):  45 1:1 Treatment Time:  45     Treatment Area: Muscle weakness (generalized) [M62.81]    SUBJECTIVE  Pain Level (0-10 scale): 0/10  Any medication changes, allergies to medications, adverse drug reactions, diagnosis change, or new procedure performed?: [x] No    [] Yes (see summary sheet for update)  Subjective functional status/changes:   [] No changes reported  I am able to move it easier now    OBJECTIVE        45 min Therapeutic Exercise:  [] See flow sheet :   Rationale: increase ROM and increase strength to improve the patients ability to grasp  Recheck ROM, , pinch left  Saebo tree x 10 levels 1-3 left  Med putty left L tool left hand  Reaching task with 1 in pegs in flexion horizontal ab and add x 10 each row 9 or 10  Dowel bench press x 10, hor ab add x 10         With   [x] TE   [] TA   [] neuro   [] other: Patient Education: [x] Review HEP    [x] Progressed/Changed HEP based on: dowleex  [] positioning   [] body mechanics   [] transfers   [] heat/ice application   [] Splint wear/care   [] Sensory re-education   [] scar management      [] other:            Other Objective/Functional Measures:   Flex 95 (80)  Abd 110 (95)   30 (20)  3 point  10  Lat  12  Tip  8     Pain Level (0-10 scale) post treatment: 0/10    ASSESSMENT/Changes in Function: Improved  pinch ROM    Patient will continue to benefit from skilled OT services to modify and progress therapeutic interventions, address ROM deficits, address strength deficits, analyze and address soft tissue restrictions, analyze and cue movement patterns and instruct in home and community integration to attain remaining goals. []  See Plan of Care  []  See progress note/recertification  []  See Discharge Summary         Progress towards goals / Updated goals:  *1.  Patient will be independent and compliant with HEP for self stretching of shoulder and elbow. Progressing, Pt taught self stretch for scapular protraction/retraction, horizontal abduction/adduction 5/10/19  2.  Patient will be able to horizontally adduct shoulder to bathe right side.  Met 6/5/19  3.  Patient will be able to flex shoulder to at least 90 for putting things away at home. [facility] [place] 6/5/19  **    PLAN  []  Upgrade activities as tolerated     []  Continue plan of care  []  Update interventions per flow sheet       []  Discharge due to:_  []  Other:_      Rohan Chappell OTR/L 6/5/2019  1:04 PM    Future Appointments   Date Time Provider Wolf Peters   6/10/2019 10:30 AM Kelli Babin, COLIN MMCPTPB [facility]   6/10/2019 11:00 AM Nikos Coles, OTR/L [facility]   6/12/2019 10:00 AM Javi Dolan PTA [facility]   6/12/2019 11:00 AM No Pereira IMTWOKF [facility]   6/17/2019 10:30 AM Javi Dolan PTA [facility]   6/17/2019 11:00 AM No Pereira HSNRMQS [facility]   6/19/2019 10:15 AM Nikos Coles OTR/L [facility]   6/19/2019 11:30 AM Jaiv Dolan PTA [facility]   6/24/2019 10:30 AM Kelli Babin PT [facility]   6/24/2019 11:00 AM No Pereira HVULMCV [facility]   6/26/2019 10:30 AM Javi Dolan PTA [facility]   6/26/2019 11:00 AM Nikos Coles OTR/L [facility]

## 2019-06-05 NOTE — PROGRESS NOTES
PT DAILY TREATMENT NOTE 10-18    Patient Name: Yusuf Rowan  Date:2019  : 1950  [x]  Patient  Verified  Payor: [employer] MEDICARE COMPLETE / Plan: [address] / Product Type: Managed Care Medicare /    In time: 12:30  Out time: 1:00   Total Treatment Time (min): 30  Visit #: 7 of 8     Medicare/BCBS Only   Total Timed Codes (min):  30 1:1 Treatment Time:  30       Treatment Area: Muscle weakness (generalized) [M62.81]    SUBJECTIVE  Pain Level (0-10 scale): 0/10  Any medication changes, allergies to medications, adverse drug reactions, diagnosis change, or new procedure performed?: [x] No    [] Yes (see summary sheet for update)  Subjective functional status/changes:   [] No changes reported  Pt reports she got the dynasplint yesterday in the mail. Her daughter puts it on before she has to go somewhere or if she is at home. Pt reports nowhere to lay down or perform bridges at home. The w/c stays outside in the car. She tries to stand and shift as long as she can and can feel the loosening of the muscle the more she stands.      OBJECTIVE      30 min Therapeutic Exercise:  [] See flow sheet :     Rationale: increase ROM and increase strength to improve the patients ability to improve ambulatory ability           With   [] TE   [] TA   [] neuro   [] other: Patient Education: [x] Review HEP    [] Progressed/Changed HEP based on:   [] positioning   [] body mechanics   [] transfers   [] heat/ice application    [] other:      Other Objective/Functional Measures:   BP: 187/88  HR: 71 bpm  Pt is going to take her second BP medicine in OT  She is going to think between now and her last session if there is anything else she needs addressed or help with  She states she doesn't need a copy of the exercises because she knows what to do  She has a band at home to try the ball band exercise  She knows the importance of stretching the calf to be able to improve her walking  She understand the importance of more standing/walking to build endurance    Pain Level (0-10 scale) post treatment: 0/10    ASSESSMENT/Changes in Function: Pt has made some progress in regards to understanding/learning independent ways to work on strengthening and stretching at home. She struggles with endurance due to limited space and time to work on walking in the apartment. She has a dynasplint to assist with prolonged stretching of the calf to better utilize the AFO for ambulation. Will complete one more session to answer any final questions and then D/C to continue management of further strengthening/stretching at home. Progress towards goals / Updated goals:  1. Patient will improve FOTO score by 13 points in order to demonstrate a significant improvement in function.   2. Patient will ambulate 20 feet with CGA and hemiwalker in order to increase ease of ambulation at home   Progressin feet with CGA and hemiwalker (5/15/19) Not met. 15' with hemiwalker and Keiry 19  3.  Patient will demonstrate understanding of her HEP in order to prepare for D/C. MET (19)      PLAN  [x]  Upgrade activities as tolerated     [x]  Continue plan of care  []  Update interventions per flow sheet       []  Discharge due to:_  [x]  Other: D/C next visit    Francisca Rolon PTA 2019  11:06 AM    Future Appointments   Date Time Provider Wolf Peters   2019 12:30 PM Kira Johnston PTA [facility]   2019  1:00 PM Meghan Wilcox, OTR/L [facility]   6/10/2019 10:30 AM Geronimo Miguel PT [facility]   6/10/2019 11:00 AM Meghan Wilcox, OTR/L [facility]   2019 10:00 AM Kira Johnston PTA [facility]   2019 11:00 AM Miguelito Siegel NYQXFHS [facility]   2019 10:30 AM Kira Johnston PTA [facility]   2019 11:00 AM Miguelito Siegel PKKEYRE [facility]   2019 10:15 AM Meghan Wilcox, OTR/L [facility]   2019 11:30 AM Kira Johnston, PTA [facility]   2019 10:30 AM Sol Finney, PT [facility] 6/24/2019 11:00 AM Troy Palacios UHQFODO [facility]   6/26/2019 10:30 AM Sammi Vizcarra PTA [facility]   6/26/2019 11:00 AM Carson Garcia OTR/L [facility]

## 2019-06-10 ENCOUNTER — HOSPITAL ENCOUNTER (OUTPATIENT)
Dept: PHYSICAL THERAPY | Age: 69
Discharge: HOME | End: 2019-06-10
Payer: MEDICARE

## 2019-06-10 PROCEDURE — 97110 THERAPEUTIC EXERCISES: CPT

## 2019-06-10 PROCEDURE — G8978 MOBILITY CURRENT STATUS: HCPCS

## 2019-06-10 PROCEDURE — G8980 MOBILITY D/C STATUS: HCPCS

## 2019-06-10 PROCEDURE — G8979 MOBILITY GOAL STATUS: HCPCS

## 2019-06-10 PROCEDURE — 97530 THERAPEUTIC ACTIVITIES: CPT

## 2019-06-10 NOTE — PROGRESS NOTES
OT DAILY TREATMENT NOTE 10-18    Patient Name: Fred Du  Date:6/10/2019  : 1950  [x]  Patient  Verified  Payor: [employer] MEDICARE COMPLETE / Plan: [employer] MED[employer] COMPLETE / Product Type: Managed Care Medicare /    In time:1100  Out time:1138  Total Treatment Time (min): 38  Visit #: 8 of 8    Medicare/BCBS Only   Total Timed Codes (min):  38 1:1 Treatment Time:  38     Treatment Area: Muscle weakness (generalized) [M62.81]    SUBJECTIVE  Pain Level (0-10 scale): 0/10  Any medication changes, allergies to medications, adverse drug reactions, diagnosis change, or new procedure performed?: [x] No    [] Yes (see summary sheet for update)  Subjective functional status/changes:   [] No changes reported  Will do these at home    OBJECTIVE      30 min Therapeutic Exercise:  [] See flow sheet :   Rationale: increase ROM and increase strength to improve the patients ability to reach  [employer] ex 10 reps each hor ab add flex rows , bench press  orange T band  Y and T 10-10-10  Review HEP    8 min Therapeutic Activity:  []  See flow sheet :   Rationale: increase ROM and improve coordination  to improve the patients ability to reach  Graded clothespins left hand x 5 each on dowle tree for ROM and motor palnning 4,6,8#           With   [] TE   [] TA   [] neuro   [] other: Patient Education: [x] Review HEP    [] Progressed/Changed HEP based on:   [] positioning   [] body mechanics   [] transfers   [] heat/ice application   [] Splint wear/care   [] Sensory re-education   [] scar management      [] other:            Other Objective/Functional Measures:   Able to reach well for T band with left UE     Pain Level (0-10 scale) post treatment: 0/10    ASSESSMENT/Changes in Function: Improved reach, shoulder strength    Patient will continue to benefit from skilled OT services to modify and progress therapeutic interventions, address ROM deficits, address strength deficits, analyze and cue movement patterns and instruct in home and community integration to attain remaining goals. []  See Plan of Care  []  See progress note/recertification  [x]  See Discharge Summary         Progress towards goals / Updated goals:  *1.  Patient will be independent and compliant with HEP for self stretching of shoulder and elbow. Progressing, Pt taught self stretch for scapular protraction/retraction, horizontal abduction/adduction 5/10/19Met for dowel ex and t bnd 6/10/19  2.  Patient will be able to horizontally adduct shoulder to bathe right side.  Met 6/5/19  3.  Patient will be able to flex shoulder to at least 90 for putting things away at home. [facility] 6/5/19    *    PLAN  []  Upgrade activities as tolerated     []  Continue plan of care  []  Update interventions per flow sheet       [x]  Discharge due to:_goals met  []  Other:_      VESNA Sanderson/L 6/10/2019  11:42 AM    Future Appointments   Date Time Provider Wolf Peters   6/12/2019 10:00 AM Leah Rushing, PTA [facility]   6/12/2019 11:00 AM Danyelle Escalante LQJZVCC [facility]   6/17/2019 10:30 AM Leah Сергей, PTA [facility]   6/17/2019 11:00 AM Danyelle Escalante DSIULPI [facility]   6/19/2019 10:15 AM Jaylon Jasmine, OTR/L [facility]   6/19/2019 11:30 AM Leah Сергей, PTA [facility]   6/24/2019 10:30 AM Pepe Altman PT [facility]   6/24/2019 11:00 AM Danyelle Escalante LHPIJYG [facility]   6/26/2019 10:30 AM Leah Seeds, PTA [facility]   6/26/2019 11:00 AM Jaylon Jasmine, OTR/L [facility]

## 2019-06-10 NOTE — PROGRESS NOTES
PT DISCHARGE DAILY NOTE AND [id number]    Date:6/10/2019  Patient name: Fred Du Start of Care: 10/1/18   Referral source: Aliyah Génesis, * : 1950   Medical/Treatment Diagnosis: Muscle weakness (generalized) [M62.81] Onset Date:2018     Prior Hospitalization: see medical history Provider#: [id number]   Medications: Verified on Patient Summary List    Comorbidities: HTN   Prior Level of Function: Ind with ambulation, working, Ind with ADLs    Visits from Start of Care: 43    Missed Visits: 6    Reporting Period : 10/1/18 to 6/10/19    [x]  Patient  Verified  Payor: [address] / Plan: [facility] [employer] [facility] / Product Type: Managed Care Medicare /    In time:10:28  Out time:10:56  Total Treatment Time (min): 28  Total Timed Codes (min): 28  1:1 Treatment Time (MC only): 26  Visit #: 8 of 8    SUBJECTIVE  Pain Level (0-10 scale): 0/10  Any medication changes, allergies to medications, adverse drug reactions, diagnosis change, or new procedure performed?: [x] No    [] Yes (see summary sheet for update)  Subjective functional status/changes:   [] No changes reported  Pt reports she is ready to be DC today. She has been working on standing with the RW at home. She does not have dynasplint yet (she misinformed the therapist last session), and she would like therapy to facilitate getting a dynasplint. OBJECTIVE    28 min Therapeutic Activity:  []  See flow sheet : Completion of FOTO, BP assessment, patient education    Rationale: Educated patient regarding process for receiving dynasplint, BP norms, importance of lowering BP, advised monitoring BP 3-4x per day and keeping a log, review of final HEP in order to ensure pt safety and allow for continued progression independently following DC.                With   [] TE   [] TA   [] neuro   [] other: Patient Education: [x] Review HEP    [] Progressed/Changed HEP based on:   [] positioning   [] body mechanics   [] transfers [] heat/ice application    [] other:      Other Objective/Functional Measures:     /93, HR 67bpm taken electronically prior to therapy  /92 taken manually after 3 minute rest break  /86 taken manually after 5 minute rest break    Therex was held this session d/t elevated BP   Provided patient education while monitoring BP and performing manual left gastroc stretch      Pain Level (0-10 scale) post treatment: 0/10    Summary of Care:  Goal: Patient will improve FOTO score by 13 points in order to demonstrate a significant improvement in function.   Status at last note/certification: Not met. Increased 5 points since initial evaluation, no change since last assessed   Status at discharge: not met    Goal: Patient will ambulate 20 feet with CGA and hemiwalker in order to increase ease of ambulation at home  Status at last note/certification: Not met. 15' with hemiwalker and Keiry  Status at discharge: not met    Goal: Patient will demonstrate understanding of her HEP in order to prepare for D/C. Status at last note/certification: Goal met. Status at discharge: met    G-Codes (GP)  Mobility    Goal  CK= 40-59%  D/C  CL= 60-79%    The severity rating is based on clinical judgment and the FOTO score. ASSESSMENT/Changes in Function:     Pt has made slow, steady progress in therapy, meeting 1/3 final goals. Endurance continues to be limited due to limited space to work on ambulation around the apartment as well as limited time d/t daughter working and pt requiring guarding with ambulation. 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 Pt would benefit from Dynasplint to address plantarflexor contracture to allow for use of AFO in order to increase ability with left LE WB and to increase left foot clearance with ambulation for reduced fall risk.   Pt is compliant with HEP to address remaining deficits independently. Pt has recently plateaued in therapy and is DC at this time d/t lack of progress.          Thank you for this referral!     PLAN  [x]Discontinue therapy: []Patient has reached or is progressing toward set goals      []Patient is non-compliant or has abdicated      [x]Due to lack of appreciable progress towards set goals    Russ Hughes, PT 6/10/2019  10:33 AM

## 2019-06-12 ENCOUNTER — APPOINTMENT (OUTPATIENT)
Dept: PHYSICAL THERAPY | Age: 69
End: 2019-06-12
Payer: MEDICARE

## 2019-06-12 NOTE — PROGRESS NOTES
[facility]ya Alicemagalie  [address] [facility]  [phone] fax    Occupational Therapy Discharge Summary  Patient name: Darron Randolph Start of Care: 10/1/18   Referral source: Blair Campo., * : 1950   Medical/Treatment Diagnosis: Muscle weakness (generalized) [M62.81]  Payor: [address] [place] [address] / Plan: 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 She has HEP. Neelam Nelson will be independent and compliant with HEP for self stretching of shoulder and elbow  Status at last note/certification:Needed update  Status at discharge: met Addition of dowel exercises and t-band    Neelam Nelson will be able to horizontally adduct shoulder to bathe right side  Status at last note/certification:with difficulty  Status at discharge: met    Neelam Nelson will be able to flex shoulder to at least 90 for putting things away at home  Status at last note/certification:Shoulder flexion 80  Status at discharge: met now 95    ASSESSMENT/Changes in Function: Improved ROM for self care and functional activities.       ASSESSMENT/RECOMMENDATIONS:  [x]Discontinue therapy: [x]Patient has reached or is progressing toward set goals      []Patient is non-compliant or has abdicated      []Due to lack of appreciable progress towards set goals    VESNA Perez/L 2019 1:50 PM

## 2019-06-12 NOTE — PROGRESS NOTES
[facility]  Arley Loving  [address] [facility]  [phone] fax    Continued Plan of Care/ Re-certification for Physical Therapy Services    Patient name: Lucia Dash Start of Care: 10/1/18   Referral source: Jackie Treviño., * : 1950   Medical/Treatment Diagnosis: Muscle weakness (generalized) [M62.81]  Payor: [address] [place] [address] / Plan: [address] / Product Type: Managed Care Medicare /  Onset Date:2018      Prior Hospitalization: see medical history Provider#: [id number]   Medications: Verified on Patient Summary List    Comorbidities: HTN   Prior Level of Function: Ind with ambulation, working, Ind with ADLs    Visits from Start of Care: 43    Missed Visits: 6    The Plan of Care and following information is based on the patient's current status:  Goal: Patient will improve FOTO score by 13 points in order to demonstrate a significant improvement in function.   Status at last note/certification: Not met. Increased 5 points since initial evaluation, no change since last assessed   Status at discharge: not met     Goal: Patient will ambulate 20 feet with CGA and hemiwalker in order to increase ease of ambulation at home  Status at last note/certification: Not met. 15' with hemiwalker and Keiry  Status at discharge: not met     Goal: Patient will demonstrate understanding of her HEP in order to prepare for D/C. Status at last note/certification: Goal met. Status at discharge: met    Key functional changes:  No recent functional improvements.   Independent with final HEP to address remaining deficits     Problems/ barriers to goal attainment: limited space in apartment to work on ambulation, decreased availability of daughter's schedule to work on ambulation and assist with HEP; significant tone and plantarflexor contracture limiting WB on left LE and limiting use of AFO/foot clearance on left with ambulation    Problem List: decrease ROM, decrease strength, impaired gait/ balance, decrease ADL/ functional abilitiies, decrease activity tolerance, decrease flexibility/ joint mobility and decrease transfer abilities    Treatment Plan: Therapeutic exercise, Therapeutic activities, Neuromuscular re-education, Physical agent/modality, Gait/balance training, Manual therapy, Patient education, Self Care training, Functional mobility training, Home safety training and Stair training     Patient Goal (s) has been updated and includes:   1. Patient will improve FOTO score by 13 points in order to demonstrate a significant improvement in function. 2. Patient will ambulate 20 feet with CGA and hemiwalker in order to increase ease of ambulation at home  3. Patient will demonstrate understanding of her HEP in order to prepare for D/C.        Goals for this certification period to be accomplished in 1 treatments:  1. Patient will improve FOTO score by 13 points in order to demonstrate a significant improvement in function. 2. Patient will ambulate 20 feet with CGA and hemiwalker in order to increase ease of ambulation at home  3. Patient will demonstrate understanding of her HEP in order to prepare for D/C.        Frequency / Duration: Patient to be seen 1 times per week for 1 weeks:    Assessment / Recommendations: Pt has made slow, steady progress in therapy, meeting 1/3 final goals. Endurance continues to be limited due to limited space to work on ambulation around the apartment as well as limited time d/t daughter working and pt requiring guarding with ambulation. 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 Pt would benefit from Dynasplint to address plantarflexor contracture to allow for use of AFO in order to increase ability with left LE WB and to increase left foot clearance with ambulation for reduced fall risk.   Pt is compliant with HEP to address remaining deficits independently. Pt has recently plateaued in therapy and is DC at this time d/t lack of progress. G-Codes (GP)  Mobility  [id number] Current  CL= 60-79%  [id number] Goal  CK= 40-59%    The severity rating is based on clinical judgment and the FOTO score. Certification Period: 6/2/19 to 7/1/19    Nani Fonsecapaul, PT 6/12/2019 5:01 PM    ________________________________________________________________________  I certify that the above Therapy Services are being furnished while the patient is under my care. I agree with the treatment plan and certify that this therapy is necessary. [] I have read the above and request that my patient continue as recommended.   [] I have read the above report and request that my patient continue therapy with the following changes/special instructions: _______________________________________  [] I have read the above report and request that my patient be discharged from therapy    Physician's Signature:____________Date:_________TIME:________    ** Signature, Date and Time must be completed for valid certification **    Please sign and return to [facility]  [facility] [employer]  [address] [facility]  [phone] fax

## 2019-06-17 ENCOUNTER — APPOINTMENT (OUTPATIENT)
Dept: PHYSICAL THERAPY | Age: 69
End: 2019-06-17
Payer: MEDICARE

## 2019-06-19 ENCOUNTER — APPOINTMENT (OUTPATIENT)
Dept: PHYSICAL THERAPY | Age: 69
End: 2019-06-19
Payer: MEDICARE

## 2019-06-24 ENCOUNTER — APPOINTMENT (OUTPATIENT)
Dept: PHYSICAL THERAPY | Age: 69
End: 2019-06-24
Payer: MEDICARE

## 2019-06-26 ENCOUNTER — APPOINTMENT (OUTPATIENT)
Dept: PHYSICAL THERAPY | Age: 69
End: 2019-06-26
Payer: MEDICARE

## 2020-02-25 NOTE — PROGRESS NOTES
PT DAILY TREATMENT NOTE - [facility]  Patient Name: Yuval Cardona Date:2018 : 1950 [x]  Patient  Verified Payor: BLUE CROSS / Plan: [facility] PPO / Product Type: PPO / In time:1033  Out time:1108 Total Treatment Time (min): 35 Visit #: 18 of 24 
  
   
Medicare/BCBS Only Total Timed Codes (min):  35 1:1 Treatment Time:  35  
  
 
 
 
Treatment Area: Muscle weakness (generalized) [M62.81] Cerebral infarction, unspecified [I63.9] SUBJECTIVE Pain Level (0-10 scale): 0/10 Any medication changes, allergies to medications, adverse drug reactions, diagnosis change, or new procedure performed?: [x] No    [] Yes (see summary sheet for update) Subjective functional status/changes:   [] No changes reported Reports doing great today. Pt request to walk OBJECTIVE 20 min Therapeutic Exercise:  [] See flow sheet :  
Rationale: increase ROM, increase strength and improve coordination to improve the patients ability to ease with independence toward self care. 15 min Gait Traininft and 43ft with _HW on level surfaces with min A and with guided left foot placement Rationale: With 
 [] TE 
 [] TA 
 [] neuro 
 [] other: Patient Education: [x] Review HEP [] Progressed/Changed HEP based on:  
[] positioning   [] body mechanics   [] transfers   [] heat/ice application   
[] other:   
 
Other Objective/Functional Measures: Recommended to perform LE stretches prior to ambulation with pt. Guided left foot placement required during ambulation. Standing rest breaks were recommended vs seated. Pt had sitting rest breaks between ambulation Pt was manually stretched in [address]. Working on self stretching as well . Pain Level (0-10 scale) post treatment: 0/10 ASSESSMENT/Changes in Function: Progressing well with improved guided  ambulation tolerance.   
 
Patient will continue to benefit from skilled PT services to modify and progress therapeutic interventions, address functional mobility deficits, address ROM deficits, address strength deficits, analyze and address soft tissue restrictions, analyze and cue movement patterns, analyze and modify body mechanics/ergonomics, assess and modify postural abnormalities and address imbalance/dizziness to attain remaining goals. []  See Plan of Care 
[]  See progress note/recertification 
[]  See Discharge Summary Progress towards goals / Updated goals: 1. Patient will improve FOTO score by 13 points in order to demonstrate a significant improvement in function.  
Status at last note/certification: 34 points at evaluation, will reassess at next visit Current: 2. Patient will perform sit to supine and supine to sit transfers with SBA in order to increase ease of transfers at home.  
Status at last note/certification: min A for left UE management  
Current: not met: min A (11/5/18) 3. Patient will ambulate 20 feet with hemiwalker and CGA in order to increase ease of ambulation at home.  
Status at last note/certification: Min A to manage left LE inside parallel bars Current: not met min A (11/14/18) PLAN [x]  Upgrade activities as tolerated     [x]  Continue plan of care 
[]  Update interventions per flow sheet      
[]  Discharge due to:_ 
[]  Other:_ Nithya Duran, PT 11/16/2018  11:15 AM 
 
Future Appointments Date Time Provider Wolf Peters 11/19/2018 10:30 AM [facility] [address] [facility]  
11/19/2018 11:15 AM Guy Loges [id number] [facility]  
11/20/2018 10:30 AM Guy Loges [id number] [facility]  
11/20/2018 11:30 AM Pablo May, PT [facility]  
11/26/2018 10:00 AM Shantel Barajas PTA [facility]  
11/26/2018 10:30 AM Guy Loges [email or web address] [facility]  
11/29/2018 10:00 AM Guy Loges WZUANRY [facility]  
11/29/2018 11:00 AM Pablo May PT [facility]  
11/30/2018 10:00 AM VESNA Navarro/L [facility]CP[facility]  
 11/30/2018 11:00 AM Haley Caballero PT [facility]  
 
 
 25-Feb-2020
